# Patient Record
Sex: FEMALE | Race: WHITE | Employment: FULL TIME | URBAN - METROPOLITAN AREA
[De-identification: names, ages, dates, MRNs, and addresses within clinical notes are randomized per-mention and may not be internally consistent; named-entity substitution may affect disease eponyms.]

---

## 2017-08-02 ENCOUNTER — OFFICE VISIT (OUTPATIENT)
Dept: FAMILY MEDICINE | Facility: CLINIC | Age: 52
End: 2017-08-02
Payer: COMMERCIAL

## 2017-08-02 VITALS
OXYGEN SATURATION: 97 % | WEIGHT: 202 LBS | HEIGHT: 67 IN | SYSTOLIC BLOOD PRESSURE: 130 MMHG | DIASTOLIC BLOOD PRESSURE: 80 MMHG | BODY MASS INDEX: 31.71 KG/M2 | HEART RATE: 87 BPM | TEMPERATURE: 98.5 F

## 2017-08-02 DIAGNOSIS — Z12.4 SCREENING FOR MALIGNANT NEOPLASM OF CERVIX: ICD-10-CM

## 2017-08-02 DIAGNOSIS — J45.30 MILD PERSISTENT ASTHMA WITHOUT COMPLICATION: ICD-10-CM

## 2017-08-02 DIAGNOSIS — Z13.6 CARDIOVASCULAR SCREENING; LDL GOAL LESS THAN 160: ICD-10-CM

## 2017-08-02 DIAGNOSIS — Z00.00 ENCOUNTER FOR ROUTINE ADULT HEALTH EXAMINATION WITHOUT ABNORMAL FINDINGS: Primary | ICD-10-CM

## 2017-08-02 DIAGNOSIS — Z12.31 ENCOUNTER FOR SCREENING MAMMOGRAM FOR BREAST CANCER: ICD-10-CM

## 2017-08-02 DIAGNOSIS — J30.2 SEASONAL ALLERGIC RHINITIS, UNSPECIFIED CHRONICITY, UNSPECIFIED TRIGGER: ICD-10-CM

## 2017-08-02 DIAGNOSIS — Z13.1 SCREENING FOR DIABETES MELLITUS: ICD-10-CM

## 2017-08-02 PROCEDURE — G0145 SCR C/V CYTO,THINLAYER,RESCR: HCPCS | Performed by: FAMILY MEDICINE

## 2017-08-02 PROCEDURE — G0476 HPV COMBO ASSAY CA SCREEN: HCPCS | Performed by: FAMILY MEDICINE

## 2017-08-02 PROCEDURE — 99396 PREV VISIT EST AGE 40-64: CPT | Performed by: FAMILY MEDICINE

## 2017-08-02 RX ORDER — FLUTICASONE PROPIONATE 220 UG/1
2 AEROSOL, METERED RESPIRATORY (INHALATION) 2 TIMES DAILY
Qty: 3 INHALER | Refills: 3 | Status: SHIPPED | OUTPATIENT
Start: 2017-08-02 | End: 2018-10-01

## 2017-08-02 RX ORDER — ALBUTEROL SULFATE 90 UG/1
2 AEROSOL, METERED RESPIRATORY (INHALATION) EVERY 6 HOURS PRN
Qty: 1 INHALER | Refills: 11 | Status: SHIPPED | OUTPATIENT
Start: 2017-08-02 | End: 2018-09-17

## 2017-08-02 RX ORDER — ALBUTEROL SULFATE 0.83 MG/ML
1 SOLUTION RESPIRATORY (INHALATION) EVERY 6 HOURS PRN
Qty: 1 BOX | Refills: 11 | Status: SHIPPED | OUTPATIENT
Start: 2017-08-02 | End: 2018-10-08

## 2017-08-02 RX ORDER — FLUTICASONE PROPIONATE 110 UG/1
2 AEROSOL, METERED RESPIRATORY (INHALATION) 2 TIMES DAILY
Qty: 1 INHALER | Refills: 11 | Status: CANCELLED | OUTPATIENT
Start: 2017-08-02

## 2017-08-02 RX ORDER — FLUTICASONE PROPIONATE 50 MCG
2 SPRAY, SUSPENSION (ML) NASAL DAILY
Qty: 1 BOTTLE | Refills: 11 | Status: SHIPPED | OUTPATIENT
Start: 2017-08-02 | End: 2018-09-17

## 2017-08-02 ASSESSMENT — ANXIETY QUESTIONNAIRES
6. BECOMING EASILY ANNOYED OR IRRITABLE: NOT AT ALL
GAD7 TOTAL SCORE: 5
IF YOU CHECKED OFF ANY PROBLEMS ON THIS QUESTIONNAIRE, HOW DIFFICULT HAVE THESE PROBLEMS MADE IT FOR YOU TO DO YOUR WORK, TAKE CARE OF THINGS AT HOME, OR GET ALONG WITH OTHER PEOPLE: SOMEWHAT DIFFICULT
1. FEELING NERVOUS, ANXIOUS, OR ON EDGE: SEVERAL DAYS
3. WORRYING TOO MUCH ABOUT DIFFERENT THINGS: MORE THAN HALF THE DAYS
5. BEING SO RESTLESS THAT IT IS HARD TO SIT STILL: NOT AT ALL
7. FEELING AFRAID AS IF SOMETHING AWFUL MIGHT HAPPEN: NOT AT ALL
2. NOT BEING ABLE TO STOP OR CONTROL WORRYING: MORE THAN HALF THE DAYS

## 2017-08-02 ASSESSMENT — PATIENT HEALTH QUESTIONNAIRE - PHQ9: 5. POOR APPETITE OR OVEREATING: NOT AT ALL

## 2017-08-02 NOTE — NURSING NOTE
"Chief Complaint   Patient presents with     Physical     Patient Request     Need new Neb Machine        Initial /86  Pulse 87  Temp 98.5  F (36.9  C) (Oral)  Ht 5' 7.44\" (1.713 m)  Wt 202 lb (91.6 kg)  SpO2 97%  BMI 31.23 kg/m2 Estimated body mass index is 31.23 kg/(m^2) as calculated from the following:    Height as of this encounter: 5' 7.44\" (1.713 m).    Weight as of this encounter: 202 lb (91.6 kg).  Medication Reconciliation: complete     An TERESITA Tanner    "

## 2017-08-02 NOTE — PATIENT INSTRUCTIONS
Preventive Health Recommendations  Female Ages 40 to 49    Yearly exam:     See your health care provider every year in order to  1. Review health changes.   2. Discuss preventive care.    3. Review your medicines if your doctor prescribed any.      Get a Pap test every three years (unless you have an abnormal result and your provider advises testing more often).      If you get Pap tests with HPV test, you only need to test every 5 years, unless you have an abnormal result. You do not need a Pap test if your uterus was removed (hysterectomy) and you have not had cancer.      You should be tested each year for STDs (sexually transmitted diseases), if you're at risk.       Ask your doctor if you should have a mammogram.      Have a colonoscopy (test for colon cancer) if someone in your family has had colon cancer or polyps before age 50.       Have a cholesterol test every 5 years.       Have a diabetes test (fasting glucose) after age 45. If you are at risk for diabetes, you should have this test every 3 years.    Shots: Get a flu shot each year. Get a tetanus shot every 10 years.     Nutrition:     Eat at least 5 servings of fruits and vegetables each day.    Eat whole-grain bread, whole-wheat pasta and brown rice instead of white grains and rice.    Talk to your provider about Calcium and Vitamin D.     Lifestyle    Exercise at least 150 minutes a week (an average of 30 minutes a day, 5 days a week). This will help you control your weight and prevent disease.    Limit alcohol to one drink per day.    No smoking.     Wear sunscreen to prevent skin cancer.    See your dentist every six months for an exam and cleaning.    [facility]    If you have any questions regarding to your visit please contact your care team:       Team Purple:   Clinic Hours Telephone Number   Dr. Claudia Perera     7am-7pm  Monday - Thursday   7am-5pm  Fridays  [phone]  (Appointment  scheduling available 24/7)    Questions about your Visit?   Team Line:  [phone]   Urgent Care - [place] - 11am-9pm Monday-Friday Saturday-Sunday- 9am-5pm   [place] - 5pm-9pm Monday-Friday Saturday-Sunday- 9am-5pm  [phone] - [place]   [phone] - [place]       What options do I have for visits at the clinic other than the traditional office visit?  To expand how we care for you, many of our providers are utilizing electronic visits (e-visits) and telephone visits, when medically appropriate, for interactions with their patients rather than a visit in the clinic.   We also offer nurse visits for many medical concerns. Just like any other service, we will bill your insurance company for this type of visit based on time spent on the phone with your provider. Not all insurance companies cover these visits. Please check with your medical insurance if this type of visit is covered. You will be responsible for any charges that are not paid by your insurance.      E-visits via CCBR-SYNARC:  generally incur a $35.00 fee.  Telephone visits:  Time spent on the phone: *charged based on time that is spent on the phone in increments of 10 minutes. Estimated cost:   5-10 mins $30.00   11-20 mins. $59.00   21-30 mins. $85.00     Use Comutohart (secure email communication and access to your chart) to send your primary care provider a message or make an appointment. Ask someone on your Team how to sign up for CCBR-SYNARC.  For a Price Quote for your services, please call our Consumer Price Line at [phone].  As always, Thank you for trusting us with your health care needs!

## 2017-08-02 NOTE — MR AVS SNAPSHOT
After Visit Summary   8/2/2017    Itzel Zaacrias    MRN: [id number]           Patient Information     Date Of Birth          1965        Visit Information        Provider Department      8/2/2017 2:45 PM Claudia Nagel MD [facility]        Today's Diagnoses     Encounter for routine adult health examination without abnormal findings    -  1    Screening for diabetes mellitus        Seasonal allergic rhinitis, unspecified chronicity, unspecified trigger        Mild persistent asthma without complication        Screening for malignant neoplasm of cervix        CARDIOVASCULAR SCREENING; LDL GOAL LESS THAN 160        Encounter for screening mammogram for breast cancer          Care Instructions      Preventive Health Recommendations  Female Ages 40 to 49    Yearly exam:     See your health care provider every year in order to  1. Review health changes.   2. Discuss preventive care.    3. Review your medicines if your doctor prescribed any.      Get a Pap test every three years (unless you have an abnormal result and your provider advises testing more often).      If you get Pap tests with HPV test, you only need to test every 5 years, unless you have an abnormal result. You do not need a Pap test if your uterus was removed (hysterectomy) and you have not had cancer.      You should be tested each year for STDs (sexually transmitted diseases), if you're at risk.       Ask your doctor if you should have a mammogram.      Have a colonoscopy (test for colon cancer) if someone in your family has had colon cancer or polyps before age 50.       Have a cholesterol test every 5 years.       Have a diabetes test (fasting glucose) after age 45. If you are at risk for diabetes, you should have this test every 3 years.    Shots: Get a flu shot each year. Get a tetanus shot every 10 years.     Nutrition:     Eat at least 5 servings of fruits and vegetables each day.    Eat whole-grain  bread, whole-wheat pasta and brown rice instead of white grains and rice.    Talk to your provider about Calcium and Vitamin D.     Lifestyle    Exercise at least 150 minutes a week (an average of 30 minutes a day, 5 days a week). This will help you control your weight and prevent disease.    Limit alcohol to one drink per day.    No smoking.     Wear sunscreen to prevent skin cancer.    See your dentist every six months for an exam and cleaning.    [facility]    If you have any questions regarding to your visit please contact your care team:       Team Purple:   Clinic Hours Telephone Number   Dr. Claudia Perera     7am-7pm  Monday - Thursday   7am-5pm  Fridays  [phone]  (Appointment scheduling available 24/7)    Questions about your Visit?   Team Line:  [phone]   [facility] - [place] [facility] [place] - 11am-9pm Monday-Friday Saturday-Sunday- 9am-5pm   [place] - 5pm-9pm Monday-Friday Saturday-Sunday- 9am-5pm  [phone] - [facility]  [phone] - [place]       What options do I have for visits at the clinic other than the traditional office visit?  To expand how we care for you, many of our providers are utilizing electronic visits (e-visits) and telephone visits, when medically appropriate, for interactions with their patients rather than a visit in the clinic.   We also offer nurse visits for many medical concerns. Just like any other service, we will bill your insurance company for this type of visit based on time spent on the phone with your provider. Not all insurance companies cover these visits. Please check with your medical insurance if this type of visit is covered. You will be responsible for any charges that are not paid by your insurance.      E-visits via Attentio:  generally incur a $35.00 fee.  Telephone visits:  Time spent on the phone: *charged based on time that is spent on the phone in increments of 10  "minutes. Estimated cost:   5-10 mins $30.00   11-20 mins. $59.00   21-30 mins. $85.00     Use Thuuzhart (secure email communication and access to your chart) to send your primary care provider a message or make an appointment. Ask someone on your Team how to sign up for TransGamingt.  For a Price Quote for your services, please call our [place] Line at [phone].  As always, Thank you for trusting us with your health care needs!              Follow-ups after your visit        Future tests that were ordered for you today     Open Future Orders        Priority Expected Expires Ordered    Lipid panel reflex to direct LDL Routine  9/2/2017 8/2/2017    GLUCOSE Routine  9/2/2017 8/2/2017    MA Screening Digital Bilateral Routine  10/31/2017 8/2/2017            Who to contact     If you have questions or need follow up information about today's clinic visit or your schedule please contact [facility] directly at [phone].  Normal or non-critical lab and imaging results will be communicated to you by Thuuzhart, letter or phone within 4 business days after the clinic has received the results. If you do not hear from us within 7 days, please contact the clinic through Thuuzhart or phone. If you have a critical or abnormal lab result, we will notify you by phone as soon as possible.  Submit refill requests through Host [employer] or call your pharmacy and they will forward the refill request to us. Please allow 3 business days for your refill to be completed.          Additional Information About Your Visit        Host Analytics Information     Host Analytics lets you send messages to your doctor, view your test results, renew your prescriptions, schedule appointments and more. To sign up, go to [email or web address] . Click on \"Log in\" on the left side of the screen, which will take you to the Welcome page. Then click on \"Sign up Now\" on the right side of the page.     You will be asked to enter the access code listed below, as " "well as some personal information. Please follow the directions to create your username and password.     Your access code is: [email or web address]  Expires: 10/31/2017  3:55 PM     Your access code will  in 90 days. If you need help or a new code, please call your [place] clinic or [phone].        Care EveryWhere ID     This is your Care EveryWhere ID. This could be used by other organizations to access your [place] medical records  [id number]        Your Vitals Were     Pulse Temperature Height Pulse Oximetry BMI (Body Mass Index)       87 98.5  F (36.9  C) (Oral) 5' 7.44\" (1.713 m) 97% 31.23 kg/m2        Blood Pressure from Last 3 Encounters:   17 130/80   13 120/78   13 100/76    Weight from Last 3 Encounters:   17 202 lb (91.6 kg)   13 165 lb (74.8 kg)   13 163 lb 4 oz (74 kg)              We Performed the Following     Asthma Action Plan (AAP)     DEPRESSION ACTION PLAN (DAP)     HPV High Risk Types DNA Cervical     Pap imaged thin layer screen with HPV - recommended age 30 - 65 years (select HPV order below)          Today's Medication Changes          These changes are accurate as of: 17  4:12 PM.  If you have any questions, ask your nurse or doctor.               Start taking these medicines.        Dose/Directions    fluticasone 220 MCG/ACT Inhaler   Commonly known as:  FLOVENT HFA   Used for:  Mild persistent asthma without complication   Replaces:  fluticasone 110 MCG/ACT Inhaler   Started by:  Claudia Nagel MD        Dose:  2 puff   Inhale 2 puffs into the lungs 2 times daily   Quantity:  3 Inhaler   Refills:  3       order for DME   Used for:  Mild persistent asthma without complication   Started by:  Claudia Nagel MD        nebulizer   Quantity:  1 each   Refills:  0         Stop taking these medicines if you haven't already. Please contact your care team if you have questions.     fluticasone 110 MCG/ACT Inhaler   Commonly known as:  FLOVENT " HFA   Replaced by:  fluticasone 220 MCG/ACT Inhaler   Stopped by:  Claudia Nagel MD                Where to get your medicines      These medications were sent to [facility] [place], [place] - [address]  [address], [place] [address]     Phone:  [phone]     albuterol (2.5 MG/3ML) 0.083% neb solution    albuterol 108 (90 BASE) MCG/ACT Inhaler    fluticasone 220 MCG/ACT Inhaler    fluticasone 50 MCG/ACT spray         Some of these will need a paper prescription and others can be bought over the counter.  Ask your nurse if you have questions.     Bring a paper prescription for each of these medications     order for DME                Primary Care Provider Office Phone # Fax #    Sav Thompson [phone]        KRAIG KOEHLER [facility] [address] [place] [facility] [address]        Equal Access to Services     [facility] Hospital[facility] AH: Hadii lori ku hadasho Soomaali, waaxda luqadaha, qaybta kaalmada adeegyada, waxay idiin hayaan chhaya khjim thurston . So [facility] [phone].    ATENCIÓN: Si habla español, tiene a sharpe disposición servicios gratuitos de asistencia lingüística. Llame al [phone].    We comply with applicable federal civil rights laws and [place] laws. We do not discriminate on the basis of race, color, national origin, age, disability sex, sexual orientation or gender identity.            Thank you!     Thank you for choosing [facility]  for your care. Our goal is always to provide you with excellent care. Hearing back from our patients is one way we can continue to improve our services. Please take a few minutes to complete the written survey that you may receive in the mail after your visit with us. Thank you!             Your Updated Medication List - Protect others around you: Learn how to safely use, store and throw away your medicines at www.disposemymeds.org.          This list is accurate as of: 8/2/17  4:12 PM.  Always use  your most recent med list.                   Brand Name Dispense Instructions for use Diagnosis    * albuterol 108 (90 BASE) MCG/ACT Inhaler    albuterol    1 Inhaler    Inhale 2 puffs into the lungs every 6 hours as needed    Mild persistent asthma without complication       * albuterol (2.5 MG/3ML) 0.083% neb solution     1 Box    Take 1 vial (2.5 mg) by nebulization every 6 hours as needed    Mild persistent asthma without complication       fluticasone 220 MCG/ACT Inhaler    FLOVENT HFA    3 Inhaler    Inhale 2 puffs into the lungs 2 times daily    Mild persistent asthma without complication       fluticasone 50 MCG/ACT spray    FLONASE    1 Bottle    Spray 2 sprays in nostril daily    Seasonal allergic rhinitis, unspecified chronicity, unspecified trigger       order for DME     1 each    nebulizer    Mild persistent asthma without complication       * Notice:  This list has 2 medication(s) that are the same as other medications prescribed for you. Read the directions carefully, and ask your doctor or other care provider to review them with you.
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
## 2017-08-02 NOTE — LETTER
My Asthma Action Plan  Name: Itzel Zacarias   YOB: 1965  Date: 8/2/2017   My doctor: LIZZETTE VALERIO MD   My clinic: [facility]        My Control Medicine: Fluticasone propionate (Flovent) -   mcg 2 puffs twice a day  My Rescue Medicine: Albuterol (Proair/Ventolin/Proventil) inhaler 1 puff every 4 hours as needed for shortness of breath    My Asthma Severity: mild persistent  Avoid your asthma triggers: smoke, upper respiratory infections, dust mites and pollens               GREEN ZONE   Good Control    I feel good    No cough or wheeze    Can work, sleep and play without asthma symptoms       Take your asthma control medicine every day.     1. If exercise triggers your asthma, take your rescue medication    15 minutes before exercise or sports, and    During exercise if you have asthma symptoms  2. Spacer to use with inhaler: If you have a spacer, make sure to use it with your inhaler             YELLOW ZONE Getting Worse  I have ANY of these:    I do not feel good    Cough or wheeze    Chest feels tight    Wake up at night   1. Keep taking your Green Zone medications  2. Start taking your rescue medicine:    every 20 minutes for up to 1 hour. Then every 4 hours for 24-48 hours.  3. If you stay in the Yellow Zone for more than 12-24 hours, contact your doctor.  4. If you do not return to the Green Zone in 12-24 hours or you get worse, start taking your oral steroid medicine if prescribed by your provider.           RED ZONE Medical Alert - Get Help  I have ANY of these:    I feel awful    Medicine is not helping    Breathing getting harder    Trouble walking or talking    Nose opens wide to breathe       1. Take your rescue medicine NOW  2. If your provider has prescribed an oral steroid medicine, start taking it NOW  3. Call your doctor NOW  4. If you are still in the Red Zone after 20 minutes and you have not reached your doctor:    Take your rescue medicine again  and    Call 911 or go to the emergency room right away    See your regular doctor within 2 weeks of an Emergency Room or Urgent Care visit for follow-up treatment.        Electronically signed by: LIZZETTE VALERIO, August 2, 2017    Annual Reminders:  Meet with Asthma Educator,  Flu Shot in the Fall, consider Pneumonia Vaccination for patients with asthma (aged 19 and older).    Pharmacy: [email or web address] [address] [place] [address] AT [address]                    Asthma Triggers  How To Control Things That Make Your Asthma Worse    Triggers are things that make your asthma worse.  Look at the list below to help you find your triggers and what you can do about them.  You can help prevent asthma flare-ups by staying away from your triggers.      Trigger                                                          What you can do   Cigarette Smoke  Tobacco smoke can make asthma worse. Do not allow smoking in your home, car or around you.  Be sure no one smokes at a child s day care or school.  If you smoke, ask your health care provider for ways to help you quit.  Ask family members to quit too.  Ask your health care provider for a referral to Quit Plan to help you quit smoking, or call 4-689-863-PLAN.     Colds, Flu, Bronchitis  These are common triggers of asthma. Wash your hands often.  Don t touch your eyes, nose or mouth.  Get a flu shot every year.     Dust Mites  These are tiny bugs that live in cloth or carpet. They are too small to see. Wash sheets and blankets in hot water every week.   Encase pillows and mattress in dust mite proof covers.  Avoid having carpet if you can. If you have carpet, vacuum weekly.   Use a dust mask and HEPA vacuum.   Pollen and Outdoor Mold  Some people are allergic to trees, grass, or weed pollen, or molds. Try to keep your windows closed.  Limit time out doors when pollen count is high.   Ask you health care provider about taking medicine during  allergy season.     Animal Dander  Some people are allergic to skin flakes, urine or saliva from pets with fur or feathers. Keep pets with fur or feathers out of your home.    If you can t keep the pet outdoors, then keep the pet out of your bedroom.  Keep the bedroom door closed.  Keep pets off cloth furniture and away from stuffed toys.     Mice, Rats, and Cockroaches  Some people are allergic to the waste from these pests.   Cover food and garbage.  Clean up spills and food crumbs.  Store grease in the refrigerator.   Keep food out of the bedroom.   Indoor Mold  This can be a trigger if your home has high moisture. Fix leaking faucets, pipes, or other sources of water.   Clean moldy surfaces.  Dehumidify basement if it is damp and smelly.   Smoke, Strong Odors, and Sprays  These can reduce air quality. Stay away from strong odors and sprays, such as perfume, powder, hair spray, paints, smoke incense, paint, cleaning products, candles and new carpet.   Exercise or Sports  Some people with asthma have this trigger. Be active!  Ask your doctor about taking medicine before sports or exercise to prevent symptoms.    Warm up for 5-10 minutes before and after sports or exercise.     Other Triggers of Asthma  Cold air:  Cover your nose and mouth with a scarf.  Sometimes laughing or crying can be a trigger.  Some medicines and food can trigger asthma.

## 2017-08-02 NOTE — LETTER
August 15, 2017    Itzel Zacarias  [address] [place] [address]    [place] [address]    Dear Itzel,  We are happy to inform you that your PAP smear result from 8/2/17 is normal.  We are now able to do a follow up test on PAP smears. The DNA test is for HPV (Human Papilloma Virus). Cervical cancer is closely linked with certain types of HPV. Your result showed no evidence of high risk HPV.  Therefore we recommend you return in 3 years for your next pap smear and HPV test.  You will still need to return to the clinic every year for an annual exam and other preventive tests.  Please contact the clinic at [phone] with any questions.  Sincerely,    LIZZETTE VALERIO MD/richard

## 2017-08-03 ASSESSMENT — PATIENT HEALTH QUESTIONNAIRE - PHQ9: SUM OF ALL RESPONSES TO PHQ QUESTIONS 1-9: 8

## 2017-08-03 ASSESSMENT — ANXIETY QUESTIONNAIRES: GAD7 TOTAL SCORE: 5

## 2017-08-03 ASSESSMENT — ASTHMA QUESTIONNAIRES: ACT_TOTALSCORE: 10

## 2017-08-04 LAB
COPATH REPORT: NORMAL
PAP: NORMAL

## 2017-08-07 ENCOUNTER — TELEPHONE (OUTPATIENT)
Dept: FAMILY MEDICINE | Facility: CLINIC | Age: 52
End: 2017-08-07

## 2017-08-07 NOTE — TELEPHONE ENCOUNTER
Patient was in to see Dr. Nagel on 08-2-17 and failed their ACT by scoring 10. Please put in the failed ACT workflow for follow-up. Thank you.

## 2017-08-07 NOTE — LETTER
[facility]  [address]  [place] [id number]  [phone]    September 5, 2017      Itzel Zacarias  [address] [place] [address] [address]  [facility] [place] [address]      Dear Itzel,     Your clinic record indicates that you are due for an asthma update. We have a survey tool called an ACT (or Asthma Control Test) we use to measure the level of control of your asthma. Please complete the enclosed questionnaire and mail it back to us in the self-addressed stamped envelope.     If you have questions about this letter please contact your provider.     Sincerely,    [facility]

## 2017-08-08 LAB
FINAL DIAGNOSIS: NORMAL
HPV HR 12 DNA CVX QL NAA+PROBE: NEGATIVE
HPV16 DNA SPEC QL NAA+PROBE: NEGATIVE
HPV18 DNA SPEC QL NAA+PROBE: NEGATIVE
SPECIMEN DESCRIPTION: NORMAL

## 2017-08-24 ENCOUNTER — RADIANT APPOINTMENT (OUTPATIENT)
Dept: MAMMOGRAPHY | Facility: CLINIC | Age: 52
End: 2017-08-24
Attending: FAMILY MEDICINE
Payer: COMMERCIAL

## 2017-08-24 DIAGNOSIS — Z13.1 SCREENING FOR DIABETES MELLITUS: ICD-10-CM

## 2017-08-24 DIAGNOSIS — Z12.31 VISIT FOR SCREENING MAMMOGRAM: ICD-10-CM

## 2017-08-24 DIAGNOSIS — Z13.6 CARDIOVASCULAR SCREENING; LDL GOAL LESS THAN 160: ICD-10-CM

## 2017-08-24 LAB
CHOLEST SERPL-MCNC: 196 MG/DL
GLUCOSE SERPL-MCNC: 96 MG/DL (ref 70–99)
HDLC SERPL-MCNC: 71 MG/DL
LDLC SERPL CALC-MCNC: 111 MG/DL
NONHDLC SERPL-MCNC: 125 MG/DL
TRIGL SERPL-MCNC: 71 MG/DL

## 2017-08-24 PROCEDURE — 82947 ASSAY GLUCOSE BLOOD QUANT: CPT | Performed by: FAMILY MEDICINE

## 2017-08-24 PROCEDURE — 80061 LIPID PANEL: CPT | Performed by: FAMILY MEDICINE

## 2017-08-24 PROCEDURE — G0202 SCR MAMMO BI INCL CAD: HCPCS | Mod: TC

## 2017-08-24 PROCEDURE — 36415 COLL VENOUS BLD VENIPUNCTURE: CPT | Performed by: FAMILY MEDICINE

## 2017-08-31 ENCOUNTER — TRANSFERRED RECORDS (OUTPATIENT)
Dept: HEALTH INFORMATION MANAGEMENT | Facility: CLINIC | Age: 52
End: 2017-08-31

## 2017-09-12 ENCOUNTER — OFFICE VISIT (OUTPATIENT)
Dept: FAMILY MEDICINE | Facility: CLINIC | Age: 52
End: 2017-09-12
Payer: COMMERCIAL

## 2017-09-12 VITALS
SYSTOLIC BLOOD PRESSURE: 122 MMHG | TEMPERATURE: 97.7 F | HEIGHT: 67 IN | BODY MASS INDEX: 32.57 KG/M2 | OXYGEN SATURATION: 98 % | HEART RATE: 92 BPM | DIASTOLIC BLOOD PRESSURE: 66 MMHG | WEIGHT: 207.5 LBS

## 2017-09-12 DIAGNOSIS — Z23 NEED FOR PROPHYLACTIC VACCINATION AND INOCULATION AGAINST INFLUENZA: ICD-10-CM

## 2017-09-12 DIAGNOSIS — J98.01 BRONCHOSPASM: ICD-10-CM

## 2017-09-12 DIAGNOSIS — J45.30 MILD PERSISTENT ASTHMA WITHOUT COMPLICATION: ICD-10-CM

## 2017-09-12 DIAGNOSIS — J30.2 SEASONAL ALLERGIC RHINITIS, UNSPECIFIED CHRONICITY, UNSPECIFIED TRIGGER: Primary | ICD-10-CM

## 2017-09-12 PROCEDURE — 99213 OFFICE O/P EST LOW 20 MIN: CPT | Mod: 25 | Performed by: FAMILY MEDICINE

## 2017-09-12 PROCEDURE — 90471 IMMUNIZATION ADMIN: CPT | Performed by: FAMILY MEDICINE

## 2017-09-12 PROCEDURE — 90686 IIV4 VACC NO PRSV 0.5 ML IM: CPT | Performed by: FAMILY MEDICINE
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
## 2017-09-12 NOTE — PROGRESS NOTES
Injectable Influenza Immunization Documentation    1.  Are you sick today? (Fever of 100.5 or higher on the day of the clinic)   No    2.  Have you ever had Guillain-Sigurd Syndrome within 6 weeks of an influenza vaccionation?  No    3. Do you have a life-threatening allergy to eggs?  No    4. Do you have a life-threatening allergy to a component of the vaccine? May include antibiotics, gelatin or latex.  No     5. Have you ever had a reaction to a dose of flu vaccine that needed immediate medical attention?  No     Form completed by Patient

## 2017-09-12 NOTE — NURSING NOTE
"Chief Complaint   Patient presents with     Cough     x 3-4 days       Initial /66  Pulse 92  Temp 97.7  F (36.5  C) (Oral)  Ht 5' 7.44\" (1.713 m)  Wt 207 lb 8 oz (94.1 kg)  SpO2 98%  BMI 32.08 kg/m2 Estimated body mass index is 32.08 kg/(m^2) as calculated from the following:    Height as of this encounter: 5' 7.44\" (1.713 m).    Weight as of this encounter: 207 lb 8 oz (94.1 kg).  Medication Reconciliation: complete     An TERESITA Tanner    "

## 2017-09-12 NOTE — PATIENT INSTRUCTIONS
[facility]    If you have any questions regarding to your visit please contact your care team:       Team Purple:   Clinic Hours Telephone Number   Dr. Claudia Perera     7am-7pm  Monday - Thursday   7am-5pm  Fridays  [phone]  (Appointment scheduling available 24/7)    Questions about your Visit?   Team Line:  [phone]   Urgent Care - [place] and [facility] - 11am-9pm Monday-Friday Saturday-Sunday- 9am-5pm   [place] - 5pm-9pm Monday-Friday Saturday-Sunday- 9am-5pm  [phone] - [facility]  [phone] - [place]       What options do I have for visits at the clinic other than the traditional office visit?  To expand how we care for you, many of our providers are utilizing electronic visits (e-visits) and telephone visits, when medically appropriate, for interactions with their patients rather than a visit in the clinic.   We also offer nurse visits for many medical concerns. Just like any other service, we will bill your insurance company for this type of visit based on time spent on the phone with your provider. Not all insurance companies cover these visits. Please check with your medical insurance if this type of visit is covered. You will be responsible for any charges that are not paid by your insurance.      E-visits via [employer]:  generally incur a $35.00 fee.  Telephone visits:  Time spent on the phone: *charged based on time that is spent on the phone in increments of 10 minutes. Estimated cost:   5-10 mins $30.00   11-20 mins. $59.00   21-30 mins. $85.00     Use [employer] (secure email communication and access to your chart) to send your primary care provider a message or make an appointment. Ask someone on your Team how to sign up for [employer].  For a Price Quote for your services, please call our Consumer Price Line at [phone].  As always, Thank you for trusting us with your health care needs!

## 2017-09-12 NOTE — MR AVS SNAPSHOT
After Visit Summary   9/12/2017    Itzel Zacarias    MRN: [id number]           Patient Information     Date Of Birth          1965        Visit Information        Provider Department      9/12/2017 11:15 AM Claudia Nagel MD [facility] Instructions    [facility]    If you have any questions regarding to your visit please contact your care team:       Team Purple:   Clinic Hours Telephone Number   Dr. Claudia Perera     7am-7pm  Monday - Thursday   7am-5pm  Fridays  [phone]  (Appointment scheduling available 24/7)    Questions about your Visit?   Team Line:  [phone]   [facility] [place] [facility] - 11am-9pm Monday-Friday Saturday-Sunday- 9am-5pm   [place] - 5pm-9pm Monday-Friday Saturday-Sunday- 9am-5pm  [phone] - [place]   [phone] - [place]       What options do I have for visits at the clinic other than the traditional office visit?  To expand how we care for you, many of our providers are utilizing electronic visits (e-visits) and telephone visits, when medically appropriate, for interactions with their patients rather than a visit in the clinic.   We also offer nurse visits for many medical concerns. Just like any other service, we will bill your insurance company for this type of visit based on time spent on the phone with your provider. Not all insurance companies cover these visits. Please check with your medical insurance if this type of visit is covered. You will be responsible for any charges that are not paid by your insurance.      E-visits via [employer]:  generally incur a $35.00 fee.  Telephone visits:  Time spent on the phone: *charged based on time that is spent on the phone in increments of 10 minutes. Estimated cost:   5-10 mins $30.00   11-20 mins. $59.00   21-30 mins. $85.00     Use [employer] (secure email communication and access to your  "chart) to send your primary care provider a message or make an appointment. Ask someone on your Team how to sign up for [employer].  For a Price Quote for your services, please call our [employer] Price Line at [phone].  As always, Thank you for trusting us with your health care needs!              Follow-ups after your visit        Who to contact     If you have questions or need follow up information about today's clinic visit or your schedule please contact [facility] directly at [phone].  Normal or non-critical lab and imaging results will be communicated to you by [employer]art, letter or phone within 4 business days after the clinic has received the results. If you do not hear from us within 7 days, please contact the clinic through Population [employer] or phone. If you have a critical or abnormal lab result, we will notify you by phone as soon as possible.  Submit refill requests through [employer] or call your pharmacy and they will forward the refill request to us. Please allow 3 business days for your refill to be completed.          Additional Information About Your Visit        [employer] Information     [employer] lets you send messages to your doctor, view your test results, renew your prescriptions, schedule appointments and more. To sign up, go to [email or web address] . Click on \"Log in\" on the left side of the screen, which will take you to the Welcome page. Then click on \"Sign up Now\" on the right side of the page.     You will be asked to enter the access code listed below, as well as some personal information. Please follow the directions to create your username and password.     Your access code is: [email or web address]  Expires: 10/31/2017  3:55 PM     Your access code will  in 90 days. If you need help or a new code, please call your [place] clinic or [phone].        Care EveryWhere ID     This is your Care EveryWhere ID. This could be used by other organizations to access your [place] medical " "records  [id number]        Your Vitals Were     Pulse Temperature Height Pulse Oximetry BMI (Body Mass Index)       92 97.7  F (36.5  C) (Oral) 5' 7.44\" (1.713 m) 98% 32.08 kg/m2        Blood Pressure from Last 3 Encounters:   09/12/17 122/66   08/02/17 130/80   11/19/13 120/78    Weight from Last 3 Encounters:   09/12/17 207 lb 8 oz (94.1 kg)   08/02/17 202 lb (91.6 kg)   11/19/13 165 lb (74.8 kg)              Today, you had the following     No orders found for display       Primary Care Provider Office Phone # Fax #    Sav Thompson [phone] [address] [place] [address]        Equal Access to Services     [facility]: Hadii aad ku hadasho Sosharron, waaxda luqadaha, qaybta kaalmada adeegyada, carine treadwell haybita thurston . So [facility] [phone].    ATENCIÓN: Si habla español, tiene a sharpe disposición servicios gratuitos de asistencia lingüística. [facility] [phone].    We comply with applicable federal civil rights laws and [place] laws. We do not discriminate on the basis of race, color, national origin, age, disability sex, sexual orientation or gender identity.            Thank you!     Thank you for choosing [facility]  for your care. Our goal is always to provide you with excellent care. Hearing back from our patients is one way we can continue to improve our services. Please take a few minutes to complete the written survey that you may receive in the mail after your visit with us. Thank you!             Your Updated Medication List - Protect others around you: Learn how to safely use, store and throw away your medicines at www.disposemymeds.org.          This list is accurate as of: 9/12/17 11:43 AM.  Always use your most recent med list.                   Brand Name Dispense Instructions for use Diagnosis    * albuterol 108 (90 BASE) MCG/ACT Inhaler    PROAIR HFA    1 Inhaler    Inhale 2 puffs into the lungs every 6 hours as needed    Mild " persistent asthma without complication       * albuterol (2.5 MG/3ML) 0.083% neb solution     1 Box    Take 1 vial (2.5 mg) by nebulization every 6 hours as needed    Mild persistent asthma without complication       fluticasone 220 MCG/ACT Inhaler    FLOVENT HFA    3 Inhaler    Inhale 2 puffs into the lungs 2 times daily    Mild persistent asthma without complication       fluticasone 50 MCG/ACT spray    FLONASE    1 Bottle    Spray 2 sprays in nostril daily    Seasonal allergic rhinitis, unspecified chronicity, unspecified trigger       order for DME     1 each    nebulizer    Mild persistent asthma without complication       * Notice:  This list has 2 medication(s) that are the same as other medications prescribed for you. Read the directions carefully, and ask your doctor or other care provider to review them with you.

## 2017-09-20 NOTE — TELEPHONE ENCOUNTER
Patient seen on 09/12/17, still having issues postponing message for 4 weeks for follow up. Tiny Vallecillo MA

## 2017-09-28 ENCOUNTER — TELEPHONE (OUTPATIENT)
Dept: FAMILY MEDICINE | Facility: CLINIC | Age: 52
End: 2017-09-28

## 2017-09-28 NOTE — TELEPHONE ENCOUNTER
Panel Management Review      Patient has the following on her problem list:     Asthma review     ACT Total Scores 8/2/2017   ACT TOTAL SCORE -   ASTHMA ER VISITS -   ASTHMA HOSPITALIZATIONS -   ACT TOTAL SCORE (Goal Greater than or Equal to 20) 10   In the past 12 months, how many times did you visit the emergency room for your asthma without being admitted to the hospital? 0   In the past 12 months, how many times were you hospitalized overnight because of your asthma? 0      1. Is Asthma diagnosis on the Problem List? Yes    2. Is Asthma listed on Health Maintenance? Yes    3. Patient is due for:  none        Composite cancer screening  Chart review shows that this patient is due/due soon for the following None  Summary:    Patient is due/failing the following:   None    Action needed:   None    Type of outreach:    None    Questions for provider review:    None                                                                                                                                    Martha Green MA       Chart routed to None .

## 2017-10-10 ASSESSMENT — ASTHMA QUESTIONNAIRES: ACT_TOTALSCORE: 15

## 2018-01-09 ENCOUNTER — TELEPHONE (OUTPATIENT)
Dept: FAMILY MEDICINE | Facility: CLINIC | Age: 53
End: 2018-01-09

## 2018-01-09 NOTE — TELEPHONE ENCOUNTER
Panel Management Review      Patient has the following on her problem list:     Asthma review     ACT Total Scores 10/9/2017   ACT TOTAL SCORE -   ASTHMA ER VISITS -   ASTHMA HOSPITALIZATIONS -   ACT TOTAL SCORE (Goal Greater than or Equal to 20) 15   In the past 12 months, how many times did you visit the emergency room for your asthma without being admitted to the hospital? 0   In the past 12 months, how many times were you hospitalized overnight because of your asthma? 0      1. Is Asthma diagnosis on the Problem List? Yes    2. Is Asthma listed on Health Maintenance? Yes    3. Patient is due for:  ACT-Failed        Composite cancer screening  Chart review shows that this patient is due/due soon for the following Colonoscopy  Summary:    Patient is due/failing the following:   ACT    Action needed:   Patient needs to do ACT.    Type of outreach:    Copy of ACT mailed to patient, will reach out in 5 days.   - postponing message for 2 weeks and will recheck with patient in 2 weeks    Questions for provider review:    None                                                                                                                                    Adry Tanner MA       Chart routed to Care Team .

## 2018-01-09 NOTE — LETTER
January 9, 2018      Itzel Zacarias  [address] [place] [id number]      Dear Itzel,     Your clinic record indicates that you are failing your ACT (or Asthma Control Test) which is   a survey tool that we use to measure the level of control of your asthma. A Score of 20 or Greater indicates that your symptoms are controlled and your medication are working as they should. Please complete the enclosed questionnaire and mail it back to us in the self-addressed stamped envelope.          If you have questions about this letter please contact your provider.     Sincerely,    [facility]

## 2018-01-26 ASSESSMENT — ASTHMA QUESTIONNAIRES: ACT_TOTALSCORE: 17

## 2018-01-29 NOTE — PROGRESS NOTES
"  SUBJECTIVE:   Itzel Zacarias is a 52 year old female who presents to clinic today for the following health issues:    Throat problem       Duration: few weeks    Description (location/character/radiation): right side of throat    Intensity:  mild    Accompanying signs and symptoms: Feels like there is a lump on the right side of throat. Horse voice, no fevers or cold symptoms.     History (similar episodes/previous evaluation): None    Precipitating or alleviating factors: None    Therapies tried and outcome: Ibuprofen has not helped.      Right sided throat lump, for a few weeks, a little sore, tried ibuprofen and hasn't helped at all, causing hoarse voice  Fluctuating weight a little  No issues with temperature   Same appetite  Has been somewhat fatigued  No recent URI  PHQ-9  - 6  DORYS- 7 - 10    Problem list and histories reviewed & adjusted, as indicated.  Additional history: as documented    BP Readings from Last 3 Encounters:   01/30/18 116/80   09/12/17 122/66   08/02/17 130/80    Wt Readings from Last 3 Encounters:   01/30/18 212 lb (96.2 kg)   09/12/17 207 lb 8 oz (94.1 kg)   08/02/17 202 lb (91.6 kg)        Reviewed and updated as needed this visit by clinical staff  Tobacco  Allergies  Meds       Reviewed and updated as needed this visit by Provider  ROS:  Constitutional, HEENT, cardiovascular, pulmonary, gi and gu systems are negative, except as otherwise noted.    OBJECTIVE:     /80  Pulse 77  Temp 98  F (36.7  C) (Oral)  Resp 18  Ht 5' 7.52\" (1.715 m)  Wt 212 lb (96.2 kg)  SpO2 99%  BMI 32.69 kg/m2  Body mass index is 32.69 kg/(m^2).  GENERAL: healthy, alert and no distress  EYES: Eyes grossly normal to inspection, PERRL and conjunctivae and sclerae normal  HENT: ear canals and TM's normal, nose and mouth without ulcers or lesions  NECK: possible small R anterior cervical lymphadenopathy vs right thyroid nodule, tender, no assymetry  RESP: lungs clear to auscultation - no rales, " rhonchi or wheezes  CV: regular rate and rhythm, normal S1 S2, no S3 or S4, no murmur, click or rub, no peripheral edema and peripheral pulses strong  MS: no gross musculoskeletal defects noted, no edema  SKIN: no suspicious lesions or rashes  NEURO: Normal strength and tone, mentation intact and speech normal  PSYCH: mentation appears normal, affect normal/bright    ASSESSMENT/PLAN:     1. Lump in neck  Will obtain CBC, thyroid studies, US of neck and thyroid  - CBC with platelets  - TSH  - T4, free  - T3, Free  - US Head Neck Soft Tissue; Future    2. Fatigue, unspecified type  Will check thyroid labs    3. DORYS (generalized anxiety disorder)  Start zoloft, refer to therapy  - MENTAL HEALTH REFERRAL  - Adult; Outpatient Treatment; Individual/Couples/Family/Group Therapy/Health Psychology; [facility] [facility] [phone]; We will contact you to schedule the appointment or please call with any questions  - sertraline (ZOLOFT) 50 MG tablet; Take 1/2 tablet (25 mg) for 1-2 weeks, then increase to 1 tablet orally daily  Dispense: 30 tablet; Refill: 0    Follow-up in 1 month    Daniel Higginbotham MD  [facility]

## 2018-01-30 ENCOUNTER — OFFICE VISIT (OUTPATIENT)
Dept: FAMILY MEDICINE | Facility: CLINIC | Age: 53
End: 2018-01-30
Payer: COMMERCIAL

## 2018-01-30 VITALS
SYSTOLIC BLOOD PRESSURE: 116 MMHG | RESPIRATION RATE: 18 BRPM | BODY MASS INDEX: 32.13 KG/M2 | TEMPERATURE: 98 F | HEART RATE: 77 BPM | OXYGEN SATURATION: 99 % | HEIGHT: 68 IN | DIASTOLIC BLOOD PRESSURE: 80 MMHG | WEIGHT: 212 LBS

## 2018-01-30 DIAGNOSIS — F41.1 GAD (GENERALIZED ANXIETY DISORDER): ICD-10-CM

## 2018-01-30 DIAGNOSIS — R22.1 LUMP IN NECK: Primary | ICD-10-CM

## 2018-01-30 DIAGNOSIS — R53.83 FATIGUE, UNSPECIFIED TYPE: ICD-10-CM

## 2018-01-30 LAB
ERYTHROCYTE [DISTWIDTH] IN BLOOD BY AUTOMATED COUNT: 13.8 % (ref 10–15)
HCT VFR BLD AUTO: 41.4 % (ref 35–47)
HGB BLD-MCNC: 13.5 G/DL (ref 11.7–15.7)
MCH RBC QN AUTO: 32.2 PG (ref 26.5–33)
MCHC RBC AUTO-ENTMCNC: 32.6 G/DL (ref 31.5–36.5)
MCV RBC AUTO: 99 FL (ref 78–100)
PLATELET # BLD AUTO: 284 10E9/L (ref 150–450)
RBC # BLD AUTO: 4.19 10E12/L (ref 3.8–5.2)
T3FREE SERPL-MCNC: 2.5 PG/ML (ref 2.3–4.2)
T4 FREE SERPL-MCNC: 0.83 NG/DL (ref 0.76–1.46)
TSH SERPL DL<=0.005 MIU/L-ACNC: 0.79 MU/L (ref 0.4–4)
WBC # BLD AUTO: 7 10E9/L (ref 4–11)

## 2018-01-30 PROCEDURE — 36415 COLL VENOUS BLD VENIPUNCTURE: CPT | Performed by: FAMILY MEDICINE

## 2018-01-30 PROCEDURE — 84481 FREE ASSAY (FT-3): CPT | Performed by: FAMILY MEDICINE

## 2018-01-30 PROCEDURE — 84439 ASSAY OF FREE THYROXINE: CPT | Performed by: FAMILY MEDICINE

## 2018-01-30 PROCEDURE — 99214 OFFICE O/P EST MOD 30 MIN: CPT | Performed by: FAMILY MEDICINE

## 2018-01-30 PROCEDURE — 84443 ASSAY THYROID STIM HORMONE: CPT | Performed by: FAMILY MEDICINE

## 2018-01-30 PROCEDURE — 85027 COMPLETE CBC AUTOMATED: CPT | Performed by: FAMILY MEDICINE

## 2018-01-30 ASSESSMENT — ANXIETY QUESTIONNAIRES
IF YOU CHECKED OFF ANY PROBLEMS ON THIS QUESTIONNAIRE, HOW DIFFICULT HAVE THESE PROBLEMS MADE IT FOR YOU TO DO YOUR WORK, TAKE CARE OF THINGS AT HOME, OR GET ALONG WITH OTHER PEOPLE: SOMEWHAT DIFFICULT
1. FEELING NERVOUS, ANXIOUS, OR ON EDGE: MORE THAN HALF THE DAYS
GAD7 TOTAL SCORE: 10
5. BEING SO RESTLESS THAT IT IS HARD TO SIT STILL: NOT AT ALL
6. BECOMING EASILY ANNOYED OR IRRITABLE: NOT AT ALL
3. WORRYING TOO MUCH ABOUT DIFFERENT THINGS: MORE THAN HALF THE DAYS
2. NOT BEING ABLE TO STOP OR CONTROL WORRYING: NEARLY EVERY DAY
7. FEELING AFRAID AS IF SOMETHING AWFUL MIGHT HAPPEN: NEARLY EVERY DAY

## 2018-01-30 ASSESSMENT — PAIN SCALES - GENERAL: PAINLEVEL: NO PAIN (0)

## 2018-01-30 ASSESSMENT — PATIENT HEALTH QUESTIONNAIRE - PHQ9: 5. POOR APPETITE OR OVEREATING: NOT AT ALL

## 2018-01-30 NOTE — MR AVS SNAPSHOT
After Visit Summary   1/30/2018    Itzel Zacarias    MRN: [id number]           Patient Information     Date Of Birth          1965        Visit Information        Provider Department      1/30/2018 9:40 AM Daniel Higginbotham MD [facility]        Today's Diagnoses     Screen for colon cancer    -  1    Lump in neck        DORYS (generalized anxiety disorder)          Care Instructions    [facility]    If you have any questions regarding to your visit please contact your care team:       Team Purple:   Clinic Hours Telephone Number   Dr. Claudia Higginbotham   7am-7pm  Monday - Thursday   7am-5pm  Fridays  [phone]  (Appointment scheduling available 24/7)    Questions about your Visit?   Team Line:  [phone]   Urgent Care - [place] and [facility] - 11am-9pm Monday-Friday Saturday-Sunday- 9am-5pm   [place] - 5pm-9pm Monday-Friday Saturday-Sunday- 9am-5pm  [phone] - [facility]  [phone] - [place]       What options do I have for visits at the clinic other than the traditional office visit?  To expand how we care for you, many of our providers are utilizing electronic visits (e-visits) and telephone visits, when medically appropriate, for interactions with their patients rather than a visit in the clinic.   We also offer nurse visits for many medical concerns. Just like any other service, we will bill your insurance company for this type of visit based on time spent on the phone with your provider. Not all insurance companies cover these visits. Please check with your medical insurance if this type of visit is covered. You will be responsible for any charges that are not paid by your insurance.      E-visits via [email or web address]:  generally incur a $35.00 fee.  Telephone visits:  Time spent on the phone: *charged based on time that is spent on the phone in increments of 10  minutes. Estimated cost:   5-10 mins $30.00   11-20 mins. $59.00   21-30 mins. $85.00     Use [employer] (secure email communication and access to your chart) to send your primary care provider a message or make an appointment. Ask someone on your Team how to sign up for [employer].  For a Price Quote for your services, please call our [employer] Price Line at [phone].  As always, Thank you for trusting us with your health care needs!    Tiny Vallecillo MA            Follow-ups after your visit        Additional Services     MENTAL HEALTH REFERRAL  - Adult; Outpatient Treatment; Individual/Couples/Family/Group Therapy/Health Psychology; [facility]: [facility] [phone]; We will contact you to schedule the appointment or please call with any questions       All scheduling is subject to the client's specific insurance plan & benefits, provider/location availability, and provider clinical specialities.  Please arrive 15 minutes early for your first appointment and bring your completed paperwork.    Please be aware that coverage of these services is subject to the terms and limitations of your health insurance plan.  Call member services at your health plan with any benefit or coverage questions.                            Future tests that were ordered for you today     Open Future Orders        Priority Expected Expires Ordered    US Head Neck Soft Tissue Routine  1/30/2019 1/30/2018            Who to contact     If you have questions or need follow up information about today's clinic visit or your schedule please contact [facility] directly at [phone].  Normal or non-critical lab and imaging results will be communicated to you by MyChart, letter or phone within 4 business days after the clinic has received the results. If you do not hear from us within 7 days, please contact the clinic through MyChart or phone. If you have a critical or abnormal lab result, we will notify you by phone as  "soon as possible.  Submit refill requests through [employer] or call your pharmacy and they will forward the refill request to us. Please allow 3 business days for your refill to be completed.          Additional Information About Your Visit        Care EveryWhere ID     This is your Care EveryWhere ID. This could be used by other organizations to access your [place] medical records  [id number]        Your Vitals Were     Pulse Temperature Respirations Height Pulse Oximetry BMI (Body Mass Index)    77 98  F (36.7  C) (Oral) 18 5' 7.52\" (1.715 m) 99% 32.69 kg/m2       Blood Pressure from Last 3 Encounters:   01/30/18 116/80   09/12/17 122/66   08/02/17 130/80    Weight from Last 3 Encounters:   01/30/18 212 lb (96.2 kg)   09/12/17 207 lb 8 oz (94.1 kg)   08/02/17 202 lb (91.6 kg)              We Performed the Following     CBC with platelets     MENTAL HEALTH REFERRAL  - Adult; Outpatient Treatment; Individual/Couples/Family/Group Therapy/Health Psychology; [facility]: [facility] [phone]; We will contact you to schedule the appointment or please call with any questions     T3, Free     T4, free     TSH          Today's Medication Changes          These changes are accurate as of 1/30/18 10:25 AM.  If you have any questions, ask your nurse or doctor.               Start taking these medicines.        Dose/Directions    sertraline 50 MG tablet   Commonly known as:  ZOLOFT   Used for:  DORYS (generalized anxiety disorder)   Started by:  Daniel Cat MD        Take 1/2 tablet (25 mg) for 1-2 weeks, then increase to 1 tablet orally daily   Quantity:  30 tablet   Refills:  0            Where to get your medicines      These medications were sent to [facility] - [address]  [address], [place] [address]     Phone:  [phone]     sertraline 50 MG tablet                Primary Care Provider Office Phone # Fax #    Sav GOFF " MD Jay [phone]       [address] [place] [facility] [address]        Equal Access to Services     [facility]ER [facility] : Hadmax aad ku hadjohn Gibbs, wahoneyda luqvimal, qamaulikta kaalmada yong, carine pimentel. So [facility] [phone].    ATENCIÓN: Si habla español, tiene a sharpe disposición servicios gratuitos de asistencia lingüística. Llame al [phone].    We comply with applicable federal civil rights laws and [place] laws. We do not discriminate on the basis of race, color, national origin, age, disability, sex, sexual orientation, or gender identity.            Thank you!     Thank you for choosing [facility]  for your care. Our goal is always to provide you with excellent care. Hearing back from our patients is one way we can continue to improve our services. Please take a few minutes to complete the written survey that you may receive in the mail after your visit with us. Thank you!             Your Updated Medication List - Protect others around you: Learn how to safely use, store and throw away your medicines at www.disposemymeds.org.          This list is accurate as of 1/30/18 10:25 AM.  Always use your most recent med list.                   Brand Name Dispense Instructions for use Diagnosis    * albuterol 108 (90 BASE) MCG/ACT Inhaler    PROAIR HFA    1 Inhaler    Inhale 2 puffs into the lungs every 6 hours as needed    Mild persistent asthma without complication       * albuterol (2.5 MG/3ML) 0.083% neb solution     1 Box    Take 1 vial (2.5 mg) by nebulization every 6 hours as needed    Mild persistent asthma without complication       fluticasone 220 MCG/ACT Inhaler    FLOVENT HFA    3 Inhaler    Inhale 2 puffs into the lungs 2 times daily    Mild persistent asthma without complication       fluticasone 50 MCG/ACT spray    FLONASE    1 Bottle    Spray 2 sprays in nostril daily    Seasonal allergic rhinitis, unspecified chronicity, unspecified  trigger       order for DME     1 each    nebulizer    Mild persistent asthma without complication       sertraline 50 MG tablet    ZOLOFT    30 tablet    Take 1/2 tablet (25 mg) for 1-2 weeks, then increase to 1 tablet orally daily    DORYS (generalized anxiety disorder)       * Notice:  This list has 2 medication(s) that are the same as other medications prescribed for you. Read the directions carefully, and ask your doctor or other care provider to review them with you.

## 2018-01-30 NOTE — PATIENT INSTRUCTIONS
[facility]    If you have any questions regarding to your visit please contact your care team:       Team Purple:   Clinic Hours Telephone Number   Dr. Claudia Higginbotham   7am-7pm  Monday - Thursday   7am-5pm  Fridays  [phone]  (Appointment scheduling available 24/7)    Questions about your Visit?   Team Line:  [phone]   Urgent Care - [place] and [facility] - 11am-9pm Monday-Friday Saturday-Sunday- 9am-5pm   [place] - 5pm-9pm Monday-Friday Saturday-Sunday- 9am-5pm  [phone] - [facility]  [phone] - [place]       What options do I have for visits at the clinic other than the traditional office visit?  To expand how we care for you, many of our providers are utilizing electronic visits (e-visits) and telephone visits, when medically appropriate, for interactions with their patients rather than a visit in the clinic.   We also offer nurse visits for many medical concerns. Just like any other service, we will bill your insurance company for this type of visit based on time spent on the phone with your provider. Not all insurance companies cover these visits. Please check with your medical insurance if this type of visit is covered. You will be responsible for any charges that are not paid by your insurance.      E-visits via [employer]:  generally incur a $35.00 fee.  Telephone visits:  Time spent on the phone: *charged based on time that is spent on the phone in increments of 10 minutes. Estimated cost:   5-10 mins $30.00   11-20 mins. $59.00   21-30 mins. $85.00     Use [employer] (secure email communication and access to your chart) to send your primary care provider a message or make an appointment. Ask someone on your Team how to sign up for [employer].  For a Price Quote for your services, please call our Consumer Price Line at [phone].  As always, Thank you for trusting us with your health care  needs!    Tiny Vallecillo MA

## 2018-01-31 ASSESSMENT — PATIENT HEALTH QUESTIONNAIRE - PHQ9: SUM OF ALL RESPONSES TO PHQ QUESTIONS 1-9: 6

## 2018-01-31 ASSESSMENT — ANXIETY QUESTIONNAIRES: GAD7 TOTAL SCORE: 10

## 2018-02-06 ENCOUNTER — RADIANT APPOINTMENT (OUTPATIENT)
Dept: ULTRASOUND IMAGING | Facility: CLINIC | Age: 53
End: 2018-02-06
Attending: FAMILY MEDICINE
Payer: COMMERCIAL

## 2018-02-06 DIAGNOSIS — R22.1 LUMP IN NECK: ICD-10-CM

## 2018-02-06 PROCEDURE — 76536 US EXAM OF HEAD AND NECK: CPT

## 2018-02-09 ENCOUNTER — TELEPHONE (OUTPATIENT)
Dept: FAMILY MEDICINE | Facility: CLINIC | Age: 53
End: 2018-02-09

## 2018-02-09 NOTE — TELEPHONE ENCOUNTER
Message left on  to return call to RN hotline at [phone].   Eliana Polo RN      Phone encounter started for result note below:      Saray Robbins,     From the ultrasound results, it looks like there are no masses in your thyroid.  Also there are no enlarged/abnormal lymph nodes or masses in your neck.  Please let me know if you have any questions.     Daniel Higginbotham MD

## 2018-04-05 ENCOUNTER — OFFICE VISIT (OUTPATIENT)
Dept: PSYCHOLOGY | Facility: CLINIC | Age: 53
End: 2018-04-05
Attending: FAMILY MEDICINE
Payer: COMMERCIAL

## 2018-04-05 ENCOUNTER — TELEPHONE (OUTPATIENT)
Dept: FAMILY MEDICINE | Facility: CLINIC | Age: 53
End: 2018-04-05

## 2018-04-05 DIAGNOSIS — F34.1 PERSISTENT DEPRESSIVE DISORDER: ICD-10-CM

## 2018-04-05 DIAGNOSIS — F41.1 GENERALIZED ANXIETY DISORDER: Primary | ICD-10-CM

## 2018-04-05 PROCEDURE — 90834 PSYTX W PT 45 MINUTES: CPT | Performed by: MARRIAGE & FAMILY THERAPIST

## 2018-04-05 ASSESSMENT — ANXIETY QUESTIONNAIRES
GAD7 TOTAL SCORE: 10
IF YOU CHECKED OFF ANY PROBLEMS ON THIS QUESTIONNAIRE, HOW DIFFICULT HAVE THESE PROBLEMS MADE IT FOR YOU TO DO YOUR WORK, TAKE CARE OF THINGS AT HOME, OR GET ALONG WITH OTHER PEOPLE: VERY DIFFICULT
3. WORRYING TOO MUCH ABOUT DIFFERENT THINGS: NEARLY EVERY DAY
5. BEING SO RESTLESS THAT IT IS HARD TO SIT STILL: NOT AT ALL
7. FEELING AFRAID AS IF SOMETHING AWFUL MIGHT HAPPEN: MORE THAN HALF THE DAYS
6. BECOMING EASILY ANNOYED OR IRRITABLE: NOT AT ALL
1. FEELING NERVOUS, ANXIOUS, OR ON EDGE: SEVERAL DAYS
2. NOT BEING ABLE TO STOP OR CONTROL WORRYING: NEARLY EVERY DAY

## 2018-04-05 ASSESSMENT — PATIENT HEALTH QUESTIONNAIRE - PHQ9: 5. POOR APPETITE OR OVEREATING: SEVERAL DAYS

## 2018-04-05 NOTE — TELEPHONE ENCOUNTER
Call her. I am not sure how care coordination can be of help. She needs to go to a job employment center. They will help her with any applications she needs to do and guide her to community programs that will educate her about computer skills.   LIZZETTE VALERIO M.D.

## 2018-04-05 NOTE — PROGRESS NOTES
"                 Progress Note - Initial Session    Client Name:  Itzel Zacarias Date: 4/5/18         Service Type: Individual      Session Start Time: 9:00  Session End Time: 9:40      Session Length: 38 - 52      Session #: 1     Attendees: Client attended alone         Diagnostic Assessment in progress.  Unable to complete documentation at the conclusion of the first session due to intake paperwork not having been completed prior to session.  Client reported seeking therapy at this time for stress and depression.  Client reported that she experiences symptoms of her mind wandering, being overly emotional, hopelessness, sleep disturbance, low energy, low self-esteem, trouble concentrating, psychomotor disturbance, feeling on edge, out-of-control worry about different things, trouble relaxing, and sense of impending doom.  Client reported that these symptoms began after her  left her 5 years ago.  Client reported that her work and finances are adversely impacted by her symptoms.  Client reported that her father passed away when she was 13, and that her parents  when she was a \"little kid.\"  Client reported that she has been  for 32 years and  for the past 5 years.  Client reported wanting to work on attaining and maintaining employment in order to increase her calm mindset as a goal for therapy.  Reviewed with client possible referral by her primary care provider to Care Coordination to assist with felt needs.      Mental Status Assessment:  Appearance:   Appropriate   Eye Contact:   Good   Psychomotor Behavior: Normal  Retarded (Slowed)   Attitude:   Cooperative   Orientation:   All  Speech   Rate / Production: Normal    Volume:  Normal   Mood:    Anxious  Depressed  Normal  Affect:    Appropriate  Blunted  Worrisome   Thought Content:  Clear   Thought Form:  Coherent  Logical   Insight:    Fair       Safety Issues and Plan for Safety and Risk Management:  Client denies current " fears or concerns for personal safety.  Client denies current or recent suicidal ideation or behaviors.  Client denies current or recent homicidal ideation or behaviors.  Client denies current or recent self injurious behavior or ideation.  Client denies other safety concerns.  A safety and risk management plan has not been developed at this time, however client was given the after-hours number / 911 should there be a change in any of these risk factors.    Diagnostic Criteria:  A. Excessive anxiety and worry about a number of events or activities (such as work or school performance).   B. The person finds it difficult to control the worry.  C. Select 3 or more symptoms (required for diagnosis). Only one item is required in children.   - Restlessness or feeling keyed up or on edge.    - Being easily fatigued.    - Difficulty concentrating or mind going blank.    - Sleep disturbance (difficulty falling or staying asleep, or restless unsatisfying sleep).   D. The focus of the anxiety and worry is not confined to features of an Axis I disorder.  E. The anxiety, worry, or physical symptoms cause clinically significant distress or impairment in social, occupational, or other important areas of functioning.   F. The disturbance is not due to the direct physiological effects of a substance (e.g., a drug of abuse, a medication) or a general medical condition (e.g., hyperthyroidism) and does not occur exclusively during a Mood Disorder, a Psychotic Disorder, or a Pervasive Developmental Disorder.    - The aformentioned symptoms began 5 year(s) ago and occurs 5 days per week and is experienced as moderate.  A. Depressed mood for most of the day, for more days than not, as indicated either by subjective account or observation by others, for at least 2 years. Note: In children and adolescents, mood can be irritable and duration must be at least 1 year.   B. Presence, while depressed, of two (or more) of the following:        -  insomnia or hypersomnia       - low energy or fatigue        - low self-esteem        - poor concentration or difficulty making decisions        - feelings of hopelessness   C. During the 2-year period (1 year for children or adolescents) of the disturbance, the person has never been without the symptoms in Criteria A and B for more than 2 months at a time.  D. Criteria for a major depressive disorder may be continously present for 2 years  E. There has never been a Manic Episode, a Mixed Episode, or a Hypomanic Episode, and criteria have never been met for Cyclothymic Disorder.   F. The disturbance is not better explained by a persistent schizoaffective disorder, schizophrenia, delusional disorder, or other specified or unspecified schizophrenia spectrum and other psychotic disorder  G. The symptoms are not attributable to the physiological effects of a substance (e.g., a drug of abuse, a medication) or another medical condition (e.g., hypothyroidism).        - Late Onset: if onset is age 21 years or older         DSM5 Diagnoses: (Sustained by DSM5 Criteria Listed Above)  Diagnoses: 300.4 (F34.1) Persistent Depressive Disorder, Late onset  300.02 (F41.1) Generalized Anxiety Disorder  Psychosocial & Contextual Factors: work stress/financial stress, lack of social support  WHODAS 2.0 (12 item) will be completed at next session.  Collateral Reports Completed:  Not Applicable      PLAN: (Homework, other):  Client scheduled follow-up ongoing services with [facility].  Client agreed to bring completed intake packet to follow-up session in two weeks.      MARGUERITE Davis

## 2018-04-05 NOTE — TELEPHONE ENCOUNTER
Spoke with patient she would like a referral to Care coordination to discuss resources for learning computer skills.  Patient has does not know how to work the computer and needs to send emails regarding health and for potential jobs.   Referral pending- please advise    Christie Olmos RN

## 2018-04-05 NOTE — MR AVS SNAPSHOT
MRN:[id number]                      After Visit Summary   4/5/2018    Itzel Zacarias    MRN: [id number]           Visit Information        Provider Department      4/5/2018 9:00 AM Rodolfo Jose, [facility]      Your next 10 appointments already scheduled     Apr 19, 2018  8:00 AM CDT   Return Visit with Rodolfohelder Wrenriman [facility] ([facility])    [address]  [place] [address]   [phone]            Apr 25, 2018 12:00 PM CDT   Return Visit with Rodolfo Wrenriman, [facility] [place] ([facility])    [address]  [place] [address]   [phone]            May 02, 2018 12:00 PM CDT   Return Visit with Rodolfo Choco Searsman [facility] ([facility])    [address] [place] [address]  [place] [address]   [phone]              Care EveryWhere ID     This is your Care EveryWhere ID. This could be used by other organizations to access your [facility] medical records  [id number]        Equal Access to Services     PETER CAMARILLO AH: Hadii lori duarte hadfedericoo Sodonellali, waaxda luqadaha, qaybta kaalmada adeegyada, carine pimentel. So [facility] [phone].    ATENCIÓN: Si habla español, tiene a sharpe disposición servicios gratuitos de asistencia lingüística. Lljosué al [phone].    We comply with applicable federal civil rights laws and [place] laws. We do not discriminate on the basis of race, color, national origin, age, disability, sex, sexual orientation, or gender identity.

## 2018-04-05 NOTE — TELEPHONE ENCOUNTER
Reason for Call:  Other call back    Detailed comments: patient would like a referral for the care coordinator here at [facility] and then a call back so she can make an appt    Phone Number Patient can be reached at: Home number on file [phone] (home)    Best Time: anytime    Can we leave a detailed message on this number? YES    Call taken on 4/5/2018 at 10:02 AM by Iman Zuleta

## 2018-04-06 ASSESSMENT — PATIENT HEALTH QUESTIONNAIRE - PHQ9: SUM OF ALL RESPONSES TO PHQ QUESTIONS 1-9: 11

## 2018-04-06 ASSESSMENT — ANXIETY QUESTIONNAIRES: GAD7 TOTAL SCORE: 10

## 2018-04-09 NOTE — TELEPHONE ENCOUNTER
Spoke with Care coordination, they are able to help patient with this and have resources.  Referral placed. Detailed message left for patient advising that order was placed and to call back RN hotline if any questions.     Christie Olmos RN

## 2018-04-12 ENCOUNTER — PATIENT OUTREACH (OUTPATIENT)
Dept: CARE COORDINATION | Facility: CLINIC | Age: 53
End: 2018-04-12

## 2018-04-12 NOTE — LETTER
[facility]  [address] [place] [address]      April 18, 2018    Itzel Zacarias  [address]    [facility] [id number]      Dear Itzel,    I am a clinic care coordinator who works with LIZZETTE VALERIO MD at the [facility]. I recently tried to call and was unable to reach you. I wanted to introduce myself and provide you with my contact information so that you can call me with questions or concerns about your health care. Below is a description of clinic care coordination and how I can further assist you.     The clinic care coordinator is a registered nurse and/or  who understand the health care system. The goal of clinic care coordination is to help you manage your health and improve access to the [facility] system in the most efficient manner. The registered nurse can assist you in meeting your health care goals by providing education, coordinating services, and strengthening the communication among your providers. The  can assist you with financial, behavioral, psychosocial, chemical dependency, counseling, and/or psychiatric resources.    Please feel free to contact me at [phone], with any questions or concerns. We at [facility] are focused on providing you with the highest-quality healthcare experience possible and that all starts with you.     Sincerely,       HINA Dumont   Clinical Care Coordinator  [phone]

## 2018-04-12 NOTE — PROGRESS NOTES
Clinic Care Coordination Contact 4/12/18  [facility]/[employer]-       Clinical Data: Care Coordinator Outreach to offer community resources for computer skills, etc.   Outreach attempted x 1.  Left message on voicemail with call back information and requested return call.  Plan:  Care Coordinator will try to reach patient again in 3-5 business days.    Aliya Gray [facility]  Care Coordinator Social Work    [facility]  [phone]  4/12/2018 9:22 AM

## 2018-04-18 NOTE — PROGRESS NOTES
Clinic Care Coordination Contact 4/18/18  [facility]/Voicemail-       Clinical Data: Care Coordinator Outreach  Outreach attempted x 2.  Left message on [employer] with call back information and requested return call.  Plan: Care Coordinator mailed out care coordination introduction letter on 4/18/18. Care Coordinator will do no further outreaches at this time.    HINA Dumont  Care Coordinator Social Work    [facility]  [phone]  4/18/2018 2:59 PM

## 2018-04-19 ENCOUNTER — OFFICE VISIT (OUTPATIENT)
Dept: PSYCHOLOGY | Facility: CLINIC | Age: 53
End: 2018-04-19
Payer: COMMERCIAL

## 2018-04-19 DIAGNOSIS — F34.1 PERSISTENT DEPRESSIVE DISORDER: ICD-10-CM

## 2018-04-19 DIAGNOSIS — F41.1 GENERALIZED ANXIETY DISORDER: Primary | ICD-10-CM

## 2018-04-19 PROCEDURE — 90791 PSYCH DIAGNOSTIC EVALUATION: CPT | Performed by: MARRIAGE & FAMILY THERAPIST

## 2018-04-19 ASSESSMENT — ANXIETY QUESTIONNAIRES
2. NOT BEING ABLE TO STOP OR CONTROL WORRYING: MORE THAN HALF THE DAYS
3. WORRYING TOO MUCH ABOUT DIFFERENT THINGS: MORE THAN HALF THE DAYS
1. FEELING NERVOUS, ANXIOUS, OR ON EDGE: SEVERAL DAYS
5. BEING SO RESTLESS THAT IT IS HARD TO SIT STILL: NOT AT ALL
7. FEELING AFRAID AS IF SOMETHING AWFUL MIGHT HAPPEN: SEVERAL DAYS
6. BECOMING EASILY ANNOYED OR IRRITABLE: NOT AT ALL
IF YOU CHECKED OFF ANY PROBLEMS ON THIS QUESTIONNAIRE, HOW DIFFICULT HAVE THESE PROBLEMS MADE IT FOR YOU TO DO YOUR WORK, TAKE CARE OF THINGS AT HOME, OR GET ALONG WITH OTHER PEOPLE: SOMEWHAT DIFFICULT
GAD7 TOTAL SCORE: 6

## 2018-04-19 ASSESSMENT — PATIENT HEALTH QUESTIONNAIRE - PHQ9: 5. POOR APPETITE OR OVEREATING: NOT AT ALL

## 2018-04-19 NOTE — Clinical Note
"Greetings, Anup Nagel and Stephania!  I met with Itzel for Diagnostic Assessment/therapy intake.  Plan for therapy will be to focus on mood improvement and increasing calm mindset.  MUKESH--she informed me that she stopped taking her Zoloft as it \"made me feel sick,\" so I referred her back to you for medication evaluation.  She also reported nutritional concerns, so I referred her to discuss those, along with possible referral to health , with you as well.  Thank you for the referral!"
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
## 2018-04-19 NOTE — MR AVS SNAPSHOT
MRN:[id number]                      After Visit Summary   4/19/2018    Itzel Zacarias    MRN: [id number]           Visit Information        Provider Department      4/19/2018 8:00 AM Damon Rodolfohelder Wilhelm, [facility] Generic      Your next 10 appointments already scheduled     Apr 25, 2018 12:00 PM CDT   Return Visit with Rodolfo Jose [facility] ([facility])    [address] [place] [address]   [phone]            May 02, 2018 12:00 PM CDT   Return Visit with Rodolfo Jose [facility] ([facility])    [address] [address]   [phone]              Care EveryWhere ID     This is your Care EveryWhere ID. This could be used by other organizations to access your [facility] medical records  [id number]        Equal Access to Services     PETER CAMARILLO AH: Hadii aad ku hadasho Soomaali, waaxda luqadaha, qaybta kaalmada adeegyada, waxay eben pimentel. So [facility] [phone].    ATENCIÓN: Si habla español, tiene a sharpe disposición servicios gratuitos de asistencia lingüística. Llame al [phone].    We comply with applicable federal civil rights laws and [place] laws. We do not discriminate on the basis of race, color, national origin, age, disability, sex, sexual orientation, or gender identity.

## 2018-04-19 NOTE — Clinical Note
Greetings, Anup Nagel and Stephania!  I met with Itzel for Diagnostic Assessment/therapy intake.  Plan for therapy will be to focus on mood improvement and increasing calm mindset.  MUKESH--she reported that she stopped taking

## 2018-04-20 ASSESSMENT — PATIENT HEALTH QUESTIONNAIRE - PHQ9: SUM OF ALL RESPONSES TO PHQ QUESTIONS 1-9: 8

## 2018-04-20 ASSESSMENT — ANXIETY QUESTIONNAIRES: GAD7 TOTAL SCORE: 6

## 2018-04-25 ENCOUNTER — OFFICE VISIT (OUTPATIENT)
Dept: PSYCHOLOGY | Facility: CLINIC | Age: 53
End: 2018-04-25
Payer: COMMERCIAL

## 2018-04-25 DIAGNOSIS — F34.1 PERSISTENT DEPRESSIVE DISORDER: Primary | ICD-10-CM

## 2018-04-25 DIAGNOSIS — F41.1 GENERALIZED ANXIETY DISORDER: ICD-10-CM

## 2018-04-25 PROCEDURE — 90834 PSYTX W PT 45 MINUTES: CPT | Performed by: MARRIAGE & FAMILY THERAPIST

## 2018-04-25 ASSESSMENT — ANXIETY QUESTIONNAIRES
1. FEELING NERVOUS, ANXIOUS, OR ON EDGE: SEVERAL DAYS
2. NOT BEING ABLE TO STOP OR CONTROL WORRYING: SEVERAL DAYS
6. BECOMING EASILY ANNOYED OR IRRITABLE: NOT AT ALL
7. FEELING AFRAID AS IF SOMETHING AWFUL MIGHT HAPPEN: SEVERAL DAYS
5. BEING SO RESTLESS THAT IT IS HARD TO SIT STILL: NOT AT ALL
IF YOU CHECKED OFF ANY PROBLEMS ON THIS QUESTIONNAIRE, HOW DIFFICULT HAVE THESE PROBLEMS MADE IT FOR YOU TO DO YOUR WORK, TAKE CARE OF THINGS AT HOME, OR GET ALONG WITH OTHER PEOPLE: SOMEWHAT DIFFICULT
GAD7 TOTAL SCORE: 4
3. WORRYING TOO MUCH ABOUT DIFFERENT THINGS: SEVERAL DAYS

## 2018-04-25 ASSESSMENT — PATIENT HEALTH QUESTIONNAIRE - PHQ9: 5. POOR APPETITE OR OVEREATING: NOT AT ALL

## 2018-04-25 NOTE — PROGRESS NOTES
Progress Note    Client Name: Itzel Zacarias  Date: 4/25/18         Service Type: Individual      Session Start Time: 12:05  Session End Time: 12:57      Session Length: 38-52     Session #: 3     Attendees: Client attended alone    Treatment Plan Last Reviewed: N/A, next due 7/25/18.  PHQ-9 / DORYS-7 : completed.     DATA      Progress Since Last Session (Related to Symptoms / Goals / Homework):   Symptoms: Improved    Homework: Achieved / completed to satisfaction  Completed in session      Episode of Care Goals: Satisfactory progress - PREPARATION (Decided to change - considering how); Intervened by negotiating a change plan and determining options / strategies for behavior change, identifying triggers, exploring social supports, and working towards setting a date to begin behavior change     Current / Ongoing Stressors and Concerns:   Client reported that she is anxious about wanting to improve her job performance.  Client identified that her poor sleep and anxiety contribute to her work struggles.  Client identified wanting to work on improving sleep and increasing calm mindset as her goals for therapy.     Treatment Objective(s) Addressed in This Session:   identify 2 sleep hygiene practices  Client identified he desired goals for therapy.     Intervention:   Motivational Interviewing: used circular/Socratic questioning to elicit client's identification of her desired therapy goals.  Solution Focused: taught/reviewed with client 2 sleep hygiene practices.        ASSESSMENT: Current Emotional / Mental Status (status of significant symptoms):   Risk status (Self / Other harm or suicidal ideation)   Client denies current fears or concerns for personal safety.   Client denies current or recent suicidal ideation or behaviors.   Client denies current or recent homicidal ideation or behaviors.   Client denies current or recent self injurious behavior or  ideation.   Client denies other safety concerns.   A safety and risk management plan has not been developed at this time, however client was given the after-hours number / 911 should there be a change in any of these risk factors.     Appearance:   Appropriate    Eye Contact:   Good    Psychomotor Behavior: Normal    Attitude:   Cooperative    Orientation:   All   Speech    Rate / Production: Normal     Volume:  Normal    Mood:    Anxious  Depressed  Normal   Affect:    Appropriate  Worrisome    Thought Content:  Clear    Thought Form:  Coherent  Logical    Insight:    Fair      Medication Review:   No changes to current psychiatric medication(s)     Medication Compliance:   No     Changes in Health Issues:   None reported     Chemical Use Review:   Substance Use: Chemical use reviewed, no active concerns identified      Tobacco Use: No current tobacco use.       Collateral Reports Completed:   Not Applicable    PLAN: (Client Tasks / Therapist Tasks / Other)  Client agreed to work on using taught/reviewed sleep hygiene practices (decreasing screen time and counting breath) to improve her sleep between now and follow-up session next week.        Rodolfo Jose, [facility]                                                         ________________________________________________________________________    Treatment Plan    Client's Name: Itzel Zacarias  YOB: 1965    Date: 4/25/18    DSM-V Diagnoses: 300.4 (F34.1) Persistent Depressive Disorder, Late onset or 300.02 (F41.1) Generalized Anxiety Disorder  Psychosocial / Contextual Factors: ongoing grief related to separation from  5 years ago, lack of employment  WHODAS: 18    Referral / Collaboration:  Referral to another professional/service is not indicated at this time..    Anticipated number of session or this episode of care: 11-20      MeasurableTreatment Goal(s) related to diagnosis / functional impairment(s)  Goal 1: Client will improve  sleep by 2 points on a 1-10 Likert scale per self-report (10 = optimal sleep).    I will know I've met my goal when I am getting regular, better sleep.      Objective #A (Client Action)    Client will identify 2 sleep hygiene practices.  Status: New - Date: 4/25/18     Intervention(s)  Therapist will teach sleep hygiene practices.    Objective #B  Client will sleep at least 7.5 hours per night for each night.  Status: New - Date: 4/25/18     Intervention(s)  Therapist will sleep hygiene practices.    Goal 2: Client will increase overall baseline calm mindset by 2 points on a 1-10 Likert scale per self-report (10 = optimal calm mindset).    I will know I've met my goal when I feel less anxious.      Objective #A (Client Action)    Status: New - Date: 4/25/18     Client will use cognitive strategies identified in therapy to challenge anxious thoughts.    Intervention(s)  Therapist will teach emotional regulation skills. CBT/REBT ABCD model.    Objective #B  Client will use at least 2 new coping skills for anxiety management in the next 12 weeks.    Status: New - Date: 4/25/18     Intervention(s)  Therapist will teach emotional regulation skills. DBT Core Mindfulness, Distress Tolerance, Emotion Regulation, and Interpersonal Effectiveness skills.    Client has reviewed and agreed to the above plan.      MARGUERITE Davis  April 25, 2018

## 2018-04-25 NOTE — MR AVS SNAPSHOT
MRN:[id number]                      After Visit Summary   4/25/2018    Itzel Zacarias    MRN: [id number]           Visit Information        Provider Department      4/25/2018 12:00 PM Rodolfo Jose, [facility] Generic      Your next 10 appointments already scheduled     May 02, 2018 12:00 PM CDT   Return Visit with Rodolfo Wrenriman [facility] ([facility])    [address]  [place] [address]   [phone]            May 15, 2018  1:00 PM CDT   Return Visit with Rodolfo Jose [facility] [place] ([facility])    [address]  [place] [address]   [phone]            May 21, 2018 11:00 AM CDT   Return Visit with Rodolfo Choco Damon [facility] ([facility])    [address] [place] [address]  [place] [address]   [phone]              Care EveryWhere ID     This is your Care EveryWhere ID. This could be used by other organizations to access your [facility] medical records  [id number]        Equal Access to Services     PETER CAMARILLO AH: Hadii lori duarte hadfedericoo Sodonellali, waaxda luqadaha, qaybta kaalmada adeegyada, carine pimentel. So [facility] [phone].    ATENCIÓN: Si habla español, tiene a sharpe disposición servicios gratuitos de asistencia lingüística. Lljosué al [phone].    We comply with applicable federal civil rights laws and [place] laws. We do not discriminate on the basis of race, color, national origin, age, disability, sex, sexual orientation, or gender identity.

## 2018-04-26 ASSESSMENT — ANXIETY QUESTIONNAIRES: GAD7 TOTAL SCORE: 4

## 2018-04-26 ASSESSMENT — PATIENT HEALTH QUESTIONNAIRE - PHQ9: SUM OF ALL RESPONSES TO PHQ QUESTIONS 1-9: 6

## 2018-05-15 ENCOUNTER — OFFICE VISIT (OUTPATIENT)
Dept: PSYCHOLOGY | Facility: CLINIC | Age: 53
End: 2018-05-15
Payer: COMMERCIAL

## 2018-05-15 DIAGNOSIS — F41.1 GENERALIZED ANXIETY DISORDER: Primary | ICD-10-CM

## 2018-05-15 DIAGNOSIS — F34.1 PERSISTENT DEPRESSIVE DISORDER: ICD-10-CM

## 2018-05-15 PROCEDURE — 90834 PSYTX W PT 45 MINUTES: CPT | Performed by: MARRIAGE & FAMILY THERAPIST

## 2018-05-15 ASSESSMENT — ANXIETY QUESTIONNAIRES
3. WORRYING TOO MUCH ABOUT DIFFERENT THINGS: SEVERAL DAYS
GAD7 TOTAL SCORE: 4
IF YOU CHECKED OFF ANY PROBLEMS ON THIS QUESTIONNAIRE, HOW DIFFICULT HAVE THESE PROBLEMS MADE IT FOR YOU TO DO YOUR WORK, TAKE CARE OF THINGS AT HOME, OR GET ALONG WITH OTHER PEOPLE: SOMEWHAT DIFFICULT
2. NOT BEING ABLE TO STOP OR CONTROL WORRYING: SEVERAL DAYS
5. BEING SO RESTLESS THAT IT IS HARD TO SIT STILL: NOT AT ALL
1. FEELING NERVOUS, ANXIOUS, OR ON EDGE: SEVERAL DAYS
7. FEELING AFRAID AS IF SOMETHING AWFUL MIGHT HAPPEN: SEVERAL DAYS
6. BECOMING EASILY ANNOYED OR IRRITABLE: NOT AT ALL

## 2018-05-15 ASSESSMENT — PATIENT HEALTH QUESTIONNAIRE - PHQ9: 5. POOR APPETITE OR OVEREATING: NOT AT ALL

## 2018-05-15 NOTE — PROGRESS NOTES
Progress Note    Client Name: Itzel Zacarias  Date: 5/15/18         Service Type: Individual      Session Start Time: 1:02  Session End Time: 1:43      Session Length: 38-52     Session #: 4     Attendees: Client attended alone    Treatment Plan Last Reviewed: N/A, next due 7/25/18.  PHQ-9 / DORYS-7 : completed.     DATA      Progress Since Last Session (Related to Symptoms / Goals / Homework):   Symptoms: Stable    Homework: Not assessed      Episode of Care Goals: Minimal progress - PREPARATION (Decided to change - considering how); Intervened by negotiating a change plan and determining options / strategies for behavior change, identifying triggers, exploring social supports, and working towards setting a date to begin behavior change     Current / Ongoing Stressors and Concerns:   Client reported that she continues to struggle to keep pace at her job.  Client identified that she is in jeopardy of losing her car due to falling behind in her payments.  Client reported that she needs to find additional employment in order to keep up with her bills (car and increased housing costs).     Treatment Objective(s) Addressed in This Session:   use at least 2 coping skills for anxiety management in the next 12 weeks     Intervention:   Solution Focused: reviewed with client advocating with her credit union and contacting possible formal supports regarding help with her employment search.        ASSESSMENT: Current Emotional / Mental Status (status of significant symptoms):   Risk status (Self / Other harm or suicidal ideation)   Client denies current fears or concerns for personal safety.   Client denies current or recent suicidal ideation or behaviors.   Client denies current or recent homicidal ideation or behaviors.   Client denies current or recent self injurious behavior or ideation.   Client denies other safety concerns.   A safety and risk management plan has not been  developed at this time, however client was given the after-hours number / 911 should there be a change in any of these risk factors.     Appearance:   Appropriate    Eye Contact:   Good    Psychomotor Behavior: Normal    Attitude:   Cooperative    Orientation:   All   Speech    Rate / Production: Normal     Volume:  Normal    Mood:    Anxious  Depressed  Normal   Affect:    Appropriate  Worrisome    Thought Content:  Clear    Thought Form:  Coherent  Logical    Insight:    Fair      Medication Review:   No changes to current psychiatric medication(s)     Medication Compliance:   No     Changes in Health Issues:   None reported     Chemical Use Review:   Substance Use: Chemical use reviewed, no active concerns identified      Tobacco Use: No current tobacco use.       Collateral Reports Completed:   Routed note to Care Team Member(s)    PLAN: (Client Tasks / Therapist Tasks / Other)  Client agreed to work on contacting her [employer] and possible formal supports for assistance with job search between now and follow-up session next week.        Rodolfo Jose, LMFT                                                         ________________________________________________________________________    Treatment Plan    Client's Name: Itzel Zacarias  YOB: 1965    Date: 4/25/18    DSM-V Diagnoses: 300.4 (F34.1) Persistent Depressive Disorder, Late onset or 300.02 (F41.1) Generalized Anxiety Disorder  Psychosocial / Contextual Factors: ongoing grief related to separation from  5 years ago, lack of employment  WHODAS: 18    Referral / Collaboration:  Referral to another professional/service is not indicated at this time..    Anticipated number of session or this episode of care: 11-20      MeasurableTreatment Goal(s) related to diagnosis / functional impairment(s)  Goal 1: Client will improve sleep by 2 points on a 1-10 Likert scale per self-report (10 = optimal sleep).    I will know I've  met my goal when I am getting regular, better sleep.      Objective #A (Client Action)    Client will identify 2 sleep hygiene practices.  Status: New - Date: 4/25/18     Intervention(s)  Therapist will teach sleep hygiene practices.    Objective #B  Client will sleep at least 7.5 hours per night for each night.  Status: New - Date: 4/25/18     Intervention(s)  Therapist will sleep hygiene practices.    Goal 2: Client will increase overall baseline calm mindset by 2 points on a 1-10 Likert scale per self-report (10 = optimal calm mindset).    I will know I've met my goal when I feel less anxious.      Objective #A (Client Action)    Status: New - Date: 4/25/18     Client will use cognitive strategies identified in therapy to challenge anxious thoughts.    Intervention(s)  Therapist will teach emotional regulation skills. CBT/REBT ABCD model.    Objective #B  Client will use at least 2 new coping skills for anxiety management in the next 12 weeks.    Status: New - Date: 4/25/18     Intervention(s)  Therapist will teach emotional regulation skills. DBT Core Mindfulness, Distress Tolerance, Emotion Regulation, and Interpersonal Effectiveness skills.    Client has reviewed and agreed to the above plan.      Rodolfo Jose, LMFT  May 15, 2018

## 2018-05-15 NOTE — Clinical Note
Greetings Aliya!  I met with Itzel today, who reported that she had not been successful in connecting with you yet.  Just wanted to give you a heads-up that I gave her your phone number again, as well as informing her of the days you are at the [facility] in hopes that she will follow through in reaching out to you for assistance with employment search.  Thanks Aliya!

## 2018-05-16 ASSESSMENT — PATIENT HEALTH QUESTIONNAIRE - PHQ9: SUM OF ALL RESPONSES TO PHQ QUESTIONS 1-9: 4

## 2018-05-16 ASSESSMENT — ANXIETY QUESTIONNAIRES: GAD7 TOTAL SCORE: 4

## 2018-05-21 ENCOUNTER — OFFICE VISIT (OUTPATIENT)
Dept: PSYCHOLOGY | Facility: CLINIC | Age: 53
End: 2018-05-21
Payer: COMMERCIAL

## 2018-05-21 DIAGNOSIS — F41.1 GENERALIZED ANXIETY DISORDER: Primary | ICD-10-CM

## 2018-05-21 DIAGNOSIS — F34.1 PERSISTENT DEPRESSIVE DISORDER: ICD-10-CM

## 2018-05-21 PROCEDURE — 90834 PSYTX W PT 45 MINUTES: CPT | Performed by: MARRIAGE & FAMILY THERAPIST

## 2018-05-21 ASSESSMENT — ANXIETY QUESTIONNAIRES
5. BEING SO RESTLESS THAT IT IS HARD TO SIT STILL: NOT AT ALL
1. FEELING NERVOUS, ANXIOUS, OR ON EDGE: SEVERAL DAYS
3. WORRYING TOO MUCH ABOUT DIFFERENT THINGS: SEVERAL DAYS
GAD7 TOTAL SCORE: 4
IF YOU CHECKED OFF ANY PROBLEMS ON THIS QUESTIONNAIRE, HOW DIFFICULT HAVE THESE PROBLEMS MADE IT FOR YOU TO DO YOUR WORK, TAKE CARE OF THINGS AT HOME, OR GET ALONG WITH OTHER PEOPLE: SOMEWHAT DIFFICULT
7. FEELING AFRAID AS IF SOMETHING AWFUL MIGHT HAPPEN: SEVERAL DAYS
6. BECOMING EASILY ANNOYED OR IRRITABLE: NOT AT ALL
2. NOT BEING ABLE TO STOP OR CONTROL WORRYING: SEVERAL DAYS

## 2018-05-21 ASSESSMENT — PATIENT HEALTH QUESTIONNAIRE - PHQ9: 5. POOR APPETITE OR OVEREATING: NOT AT ALL

## 2018-05-22 ASSESSMENT — PATIENT HEALTH QUESTIONNAIRE - PHQ9: SUM OF ALL RESPONSES TO PHQ QUESTIONS 1-9: 4

## 2018-05-22 ASSESSMENT — ANXIETY QUESTIONNAIRES: GAD7 TOTAL SCORE: 4

## 2018-05-22 NOTE — PROGRESS NOTES
Progress Note    Client Name: Itzel Zacarias  Date: 5/21/18         Service Type: Individual      Session Start Time: 11:04  Session End Time: 11:50      Session Length: 38-52     Session #: 5     Attendees: Client attended alone    Treatment Plan Last Reviewed: N/A, next due 7/25/18.  PHQ-9 / DORYS-7 : completed.     DATA      Progress Since Last Session (Related to Symptoms / Goals / Homework):   Symptoms: Stable    Homework: Partially completed      Episode of Care Goals: Minimal progress - ACTION (Actively working towards change); Intervened by reinforcing change plan / affirming steps taken     Current / Ongoing Stressors and Concerns:   Client reported that she attempted to negotiate with her [employer] union regarding her car payments, but was unsuccessful, so she made plan to make payments to catch up which may put her in jeopardy of falling behind on other bills.  Client reported that she continues to experience anxiety at work as she is often behind and requires assistance from her supervisor.     Treatment Objective(s) Addressed in This Session:   use at least 2 coping skills for anxiety management in the next 12 weeks     Intervention:   DBT: reviewed with client keeping mindful focus on doing her job and ignoring anxiety-producing thoughts.  Solution Focused: reviewed with client plan to contact possible formal supports regarding help with her employment search.        ASSESSMENT: Current Emotional / Mental Status (status of significant symptoms):   Risk status (Self / Other harm or suicidal ideation)   Client denies current fears or concerns for personal safety.   Client denies current or recent suicidal ideation or behaviors.   Client denies current or recent homicidal ideation or behaviors.   Client denies current or recent self injurious behavior or ideation.   Client denies other safety concerns.   A safety and risk management plan has not been developed  at this time, however client was given the after-hours number / 911 should there be a change in any of these risk factors.     Appearance:   Appropriate    Eye Contact:   Good    Psychomotor Behavior: Normal    Attitude:   Cooperative    Orientation:   All   Speech    Rate / Production: Normal     Volume:  Normal    Mood:    Anxious  Depressed  Normal   Affect:    Appropriate  Worrisome    Thought Content:  Clear    Thought Form:  Coherent  Logical    Insight:    Fair      Medication Review:   No changes to current psychiatric medication(s)     Medication Compliance:   No     Changes in Health Issues:   None reported     Chemical Use Review:   Substance Use: Chemical use reviewed, no active concerns identified      Tobacco Use: No current tobacco use.       Collateral Reports Completed:   Not Applicable    PLAN: (Client Tasks / Therapist Tasks / Other)  Client agreed to work on contacting possible formal supports for assistance with job search and to mindfully focus on completing her work, ignoring anxiety-producing thoughts, between now and follow-up session in two weeks.        Rodolfo Jose, LMFT                                                         ________________________________________________________________________    Treatment Plan    Client's Name: Itzel Zacarias  YOB: 1965    Date: 4/25/18    DSM-V Diagnoses: 300.4 (F34.1) Persistent Depressive Disorder, Late onset or 300.02 (F41.1) Generalized Anxiety Disorder  Psychosocial / Contextual Factors: ongoing grief related to separation from  5 years ago, lack of employment  WHODAS: 18    Referral / Collaboration:  Referral to another professional/service is not indicated at this time..    Anticipated number of session or this episode of care: 11-20      MeasurableTreatment Goal(s) related to diagnosis / functional impairment(s)  Goal 1: Client will improve sleep by 2 points on a 1-10 Likert scale per self-report (10 =  optimal sleep).    I will know I've met my goal when I am getting regular, better sleep.      Objective #A (Client Action)    Client will identify 2 sleep hygiene practices.  Status: New - Date: 4/25/18     Intervention(s)  Therapist will teach sleep hygiene practices.    Objective #B  Client will sleep at least 7.5 hours per night for each night.  Status: New - Date: 4/25/18     Intervention(s)  Therapist will sleep hygiene practices.    Goal 2: Client will increase overall baseline calm mindset by 2 points on a 1-10 Likert scale per self-report (10 = optimal calm mindset).    I will know I've met my goal when I feel less anxious.      Objective #A (Client Action)    Status: New - Date: 4/25/18     Client will use cognitive strategies identified in therapy to challenge anxious thoughts.    Intervention(s)  Therapist will teach emotional regulation skills. CBT/REBT ABCD model.    Objective #B  Client will use at least 2 new coping skills for anxiety management in the next 12 weeks.    Status: New - Date: 4/25/18     Intervention(s)  Therapist will teach emotional regulation skills. DBT Core Mindfulness, Distress Tolerance, Emotion Regulation, and Interpersonal Effectiveness skills.    Client has reviewed and agreed to the above plan.      Rodolfo Joes, LMFT  May 21, 2018

## 2018-06-04 ENCOUNTER — OFFICE VISIT (OUTPATIENT)
Dept: PSYCHOLOGY | Facility: CLINIC | Age: 53
End: 2018-06-04
Payer: COMMERCIAL

## 2018-06-04 DIAGNOSIS — F41.1 GENERALIZED ANXIETY DISORDER: Primary | ICD-10-CM

## 2018-06-04 DIAGNOSIS — F34.1 PERSISTENT DEPRESSIVE DISORDER: ICD-10-CM

## 2018-06-04 PROCEDURE — 90834 PSYTX W PT 45 MINUTES: CPT | Performed by: MARRIAGE & FAMILY THERAPIST

## 2018-06-04 ASSESSMENT — PATIENT HEALTH QUESTIONNAIRE - PHQ9: 5. POOR APPETITE OR OVEREATING: NOT AT ALL

## 2018-06-04 ASSESSMENT — ANXIETY QUESTIONNAIRES
2. NOT BEING ABLE TO STOP OR CONTROL WORRYING: SEVERAL DAYS
GAD7 TOTAL SCORE: 4
1. FEELING NERVOUS, ANXIOUS, OR ON EDGE: SEVERAL DAYS
5. BEING SO RESTLESS THAT IT IS HARD TO SIT STILL: NOT AT ALL
7. FEELING AFRAID AS IF SOMETHING AWFUL MIGHT HAPPEN: SEVERAL DAYS
3. WORRYING TOO MUCH ABOUT DIFFERENT THINGS: SEVERAL DAYS
6. BECOMING EASILY ANNOYED OR IRRITABLE: NOT AT ALL
IF YOU CHECKED OFF ANY PROBLEMS ON THIS QUESTIONNAIRE, HOW DIFFICULT HAVE THESE PROBLEMS MADE IT FOR YOU TO DO YOUR WORK, TAKE CARE OF THINGS AT HOME, OR GET ALONG WITH OTHER PEOPLE: SOMEWHAT DIFFICULT

## 2018-06-04 NOTE — PROGRESS NOTES
"                                             Progress Note    Client Name: Itzel Zacarias  Date: 6/4/18         Service Type: Individual      Session Start Time: 12:04  Session End Time: 12:45      Session Length: 38-52     Session #: 6     Attendees: Client attended alone    Treatment Plan Last Reviewed: N/A, next due 7/25/18.  PHQ-9 / DORYS-7 : completed.     DATA      Progress Since Last Session (Related to Symptoms / Goals / Homework):   Symptoms: Stable    Homework: Partially completed      Episode of Care Goals: Minimal progress - ACTION (Actively working towards change); Intervened by reinforcing change plan / affirming steps taken     Current / Ongoing Stressors and Concerns:   Client reported that she has been working less lately, which has resulted in her not being able to catch up on her bills.  Client reported that her work is often inconsistent when it is during the \"slow\" time of the year.  Client reported that her pay rate decreased when transferring to a job within the same company, and that she hasn't addressed this with her supervisor out of fear.     Treatment Objective(s) Addressed in This Session:   learn & utilize at least 2 assertive communication skills weekly     Intervention:   Interpersonal Therapy: reviewed with client self-advocating assertively with her formal supports and her supervisor.        ASSESSMENT: Current Emotional / Mental Status (status of significant symptoms):   Risk status (Self / Other harm or suicidal ideation)   Client denies current fears or concerns for personal safety.   Client denies current or recent suicidal ideation or behaviors.   Client denies current or recent homicidal ideation or behaviors.   Client denies current or recent self injurious behavior or ideation.   Client denies other safety concerns.   A safety and risk management plan has not been developed at this time, however client was given the after-hours number / 911 should there be a change in any " of these risk factors.     Appearance:   Appropriate    Eye Contact:   Good    Psychomotor Behavior: Normal    Attitude:   Cooperative    Orientation:   All   Speech    Rate / Production: Normal     Volume:  Normal    Mood:    Anxious  Depressed  Normal   Affect:    Appropriate  Worrisome    Thought Content:  Clear    Thought Form:  Coherent  Logical    Insight:    Fair      Medication Review:   No changes to current psychiatric medication(s)     Medication Compliance:   No     Changes in Health Issues:   None reported     Chemical Use Review:   Substance Use: Chemical use reviewed, no active concerns identified      Tobacco Use: No current tobacco use.       Collateral Reports Completed:   Not Applicable    PLAN: (Client Tasks / Therapist Tasks / Other)  Client agreed to work on contacting possible formal supports for assistance with job search and contemplating contacting her supervisor regarding her pay rate assertivelybetween now and follow-up session in two weeks.        Rodolfo Jose, [facility]                                                         ________________________________________________________________________    Treatment Plan    Client's Name: Itzel Zacarias  YOB: 1965    Date: 4/25/18    DSM-V Diagnoses: 300.4 (F34.1) Persistent Depressive Disorder, Late onset or 300.02 (F41.1) Generalized Anxiety Disorder  Psychosocial / Contextual Factors: ongoing grief related to separation from  5 years ago, lack of employment  WHODAS: 18    Referral / Collaboration:  Referral to another professional/service is not indicated at this time..    Anticipated number of session or this episode of care: 11-20      MeasurableTreatment Goal(s) related to diagnosis / functional impairment(s)  Goal 1: Client will improve sleep by 2 points on a 1-10 Likert scale per self-report (10 = optimal sleep).    I will know I've met my goal when I am getting regular, better sleep.      Objective #A  (Client Action)    Client will identify 2 sleep hygiene practices.  Status: New - Date: 4/25/18     Intervention(s)  Therapist will teach sleep hygiene practices.    Objective #B  Client will sleep at least 7.5 hours per night for each night.  Status: New - Date: 4/25/18     Intervention(s)  Therapist will sleep hygiene practices.    Goal 2: Client will increase overall baseline calm mindset by 2 points on a 1-10 Likert scale per self-report (10 = optimal calm mindset).    I will know I've met my goal when I feel less anxious.      Objective #A (Client Action)    Status: New - Date: 4/25/18     Client will use cognitive strategies identified in therapy to challenge anxious thoughts.    Intervention(s)  Therapist will teach emotional regulation skills. CBT/REBT ABCD model.    Objective #B  Client will use at least 2 new coping skills for anxiety management in the next 12 weeks.    Status: New - Date: 4/25/18     Intervention(s)  Therapist will teach emotional regulation skills. DBT Core Mindfulness, Distress Tolerance, Emotion Regulation, and Interpersonal Effectiveness skills.    Client has reviewed and agreed to the above plan.      Rodolfo Jose, LMFT  June 4, 2018

## 2018-06-04 NOTE — MR AVS SNAPSHOT
MRN:[id number]                      After Visit Summary   6/4/2018    Itzel Zacarias    MRN: [id number]           Visit Information        Provider Department      6/4/2018 12:00 PM Damon Rodolfo Choco, [facility]      Your next 10 appointments already scheduled     Jun 20, 2018 11:00 AM CDT   Return Visit with Rodolfo Jose [facility]    [address] [place] [id number]   [phone]            Jul 06, 2018 12:00 PM CDT   Return Visit with Rodolfo Jose [facility]    [address] [facility] [address] [facility] [address]   [phone]              Care EveryWhere ID     This is your Care EveryWhere ID. This could be used by other organizations to access your [place] medical records  [id number]        Equal Access to Services     PETER CAMARILLO AH: Hadii aad ku hadasho Soomaali, waaxda luqadaha, qaybta kaalmada adeegyada, waxay eben pimentel. So [facility] [phone].    ATENCIÓN: Si habla español, tiene a sharpe disposición servicios gratuitos de asistencia lingüística. Llame al [phone].    We comply with applicable federal civil rights laws and [place] laws. We do not discriminate on the basis of race, color, national origin, age, disability, sex, sexual orientation, or gender identity.

## 2018-06-05 ASSESSMENT — PATIENT HEALTH QUESTIONNAIRE - PHQ9: SUM OF ALL RESPONSES TO PHQ QUESTIONS 1-9: 4

## 2018-06-05 ASSESSMENT — ANXIETY QUESTIONNAIRES: GAD7 TOTAL SCORE: 4

## 2018-06-19 ENCOUNTER — PATIENT OUTREACH (OUTPATIENT)
Dept: CARE COORDINATION | Facility: CLINIC | Age: 53
End: 2018-06-19

## 2018-06-19 NOTE — PROGRESS NOTES
Clinic Care Coordination Contact 6/19/18  Care Team Conversations-SW    Received a phone call from the pt who asked if we could meet tomorrow at the [facility] at 11:00 as she has an apt with her counselor at 12:00.     Aliya Gary, [facility]  Care Coordinator Social Work    [facility]  [phone]  6/19/2018 11:52 AM

## 2018-06-20 ENCOUNTER — OFFICE VISIT (OUTPATIENT)
Dept: PSYCHOLOGY | Facility: CLINIC | Age: 53
End: 2018-06-20
Payer: COMMERCIAL

## 2018-06-20 DIAGNOSIS — F41.1 GENERALIZED ANXIETY DISORDER: Primary | ICD-10-CM

## 2018-06-20 DIAGNOSIS — F34.1 PERSISTENT DEPRESSIVE DISORDER: ICD-10-CM

## 2018-06-20 PROCEDURE — 90834 PSYTX W PT 45 MINUTES: CPT | Performed by: MARRIAGE & FAMILY THERAPIST

## 2018-06-20 ASSESSMENT — ANXIETY QUESTIONNAIRES
2. NOT BEING ABLE TO STOP OR CONTROL WORRYING: SEVERAL DAYS
7. FEELING AFRAID AS IF SOMETHING AWFUL MIGHT HAPPEN: SEVERAL DAYS
1. FEELING NERVOUS, ANXIOUS, OR ON EDGE: SEVERAL DAYS
3. WORRYING TOO MUCH ABOUT DIFFERENT THINGS: SEVERAL DAYS
6. BECOMING EASILY ANNOYED OR IRRITABLE: NOT AT ALL
5. BEING SO RESTLESS THAT IT IS HARD TO SIT STILL: NOT AT ALL
IF YOU CHECKED OFF ANY PROBLEMS ON THIS QUESTIONNAIRE, HOW DIFFICULT HAVE THESE PROBLEMS MADE IT FOR YOU TO DO YOUR WORK, TAKE CARE OF THINGS AT HOME, OR GET ALONG WITH OTHER PEOPLE: SOMEWHAT DIFFICULT
GAD7 TOTAL SCORE: 4

## 2018-06-20 ASSESSMENT — PATIENT HEALTH QUESTIONNAIRE - PHQ9: 5. POOR APPETITE OR OVEREATING: NOT AT ALL

## 2018-06-20 NOTE — PROGRESS NOTES
Clinic Care Coordination Contact 6/20/18  Care Team Conversations-SW    Pt met with this writer in clinic today to obtain resources to learn computer skills. Pt explained that she would like to get a full time job as the job she has right now only given her around 30 hours a week. She states she does not have knowledge of computers and feels she will need some if she will be finding a new job.     Discussion with the pt about her current housing, etc. Pt rents an apt and has a roommate to share costs. 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 She states she uses the food shelves near her home, but was hesitant to apply for SNAP at this time as she felt she would be over income and states they only gave her 83 dollars a month when she was on it. I explained that I would be happy to help her fill out the application if she was interested.     We made phone calls together of different programs near her home. We enrolled her [facility] computer classes [facility] [employer] one time a month. She will go on July 5th to her first class. We also contacted the [employer] [facility] [place] who offer classes for beginner and intermediate computer classes. They don't have their July schedule posted so the pt will call to inquire this schedule. She will need to have an account on [employer] website before she can enroll.    We also left a VM for the [employer]  to call her back in regards to what programs they can offer her. She said her sister works for a temp agency and wants her to go there too. Discussion with her about talking with the [employer] about this to see if they could help her find new employment without having to take computer classes.     SW will plan on contacting the pt in approx 1 week. She agrees to call me sooner should she need assistance of have questions.     Aliya Gray, ISABELLA  Care Coordinator  Social Work    [facility] [place]  [phone]  6/20/2018 2:35 PM

## 2018-06-20 NOTE — PROGRESS NOTES
Progress Note    Client Name: Itzel Zacarias  Date: 6/20/18         Service Type: Individual      Session Start Time: 11:02  Session End Time: 11:50      Session Length: 38-52     Session #: 7     Attendees: Client attended alone    Treatment Plan Last Reviewed: N/A, next due 7/25/18.  PHQ-9 / DORYS-7 : completed.     DATA      Progress Since Last Session (Related to Symptoms / Goals / Homework):   Symptoms: Stable    Homework: Partially completed      Episode of Care Goals: Satisfactory progress - ACTION (Actively working towards change); Intervened by reinforcing change plan / affirming steps taken     Current / Ongoing Stressors and Concerns:   Client reported that her work schedule has been reduced to 3 days/week in order for her to not be let go.  Client reported that this has led her to fall further behind on her bills, which has in turn led to increased anxiety.  Client reported that she has thought about other jobs to apply for but has not followed through on applying for any of them yet, and didn't identify any specific barriers to doing so other than her current work schedule.     Treatment Objective(s) Addressed in This Session:   use at least 2 coping skills for anxiety management in the next 12 weeks     Intervention:   Solution Focused: reviewed with client her job search options and strategies to follow through with applications along with working with Care Coordination        ASSESSMENT: Current Emotional / Mental Status (status of significant symptoms):   Risk status (Self / Other harm or suicidal ideation)   Client denies current fears or concerns for personal safety.   Client denies current or recent suicidal ideation or behaviors.   Client denies current or recent homicidal ideation or behaviors.   Client denies current or recent self injurious behavior or ideation.   Client denies other safety concerns.   A safety and risk management plan has not  been developed at this time, however client was given the after-hours number / 911 should there be a change in any of these risk factors.     Appearance:   Appropriate    Eye Contact:   Good    Psychomotor Behavior: Normal    Attitude:   Cooperative    Orientation:   All   Speech    Rate / Production: Normal     Volume:  Normal    Mood:    Anxious  Depressed  Normal   Affect:    Appropriate  Worrisome    Thought Content:  Clear    Thought Form:  Coherent  Logical    Insight:    Fair      Medication Review:   No changes to current psychiatric medication(s)     Medication Compliance:   No     Changes in Health Issues:   None reported     Chemical Use Review:   Substance Use: Chemical use reviewed, no active concerns identified      Tobacco Use: No current tobacco use.       Collateral Reports Completed:   Not Applicable    PLAN: (Client Tasks / Therapist Tasks / Other)  Client agreed to work on applying for jobs and working with formal supports (i.e. Care Coordination) regarding job search between now and follow-up session in two weeks.        Rodolfo Jose, LMFT                                                         ________________________________________________________________________    Treatment Plan    Client's Name: Itzel Zacarias  YOB: 1965    Date: 4/25/18    DSM-V Diagnoses: 300.4 (F34.1) Persistent Depressive Disorder, Late onset or 300.02 (F41.1) Generalized Anxiety Disorder  Psychosocial / Contextual Factors: ongoing grief related to separation from  5 years ago, lack of employment  WHODAS: 18    Referral / Collaboration:  Referral to another professional/service is not indicated at this time..    Anticipated number of session or this episode of care: 11-20      MeasurableTreatment Goal(s) related to diagnosis / functional impairment(s)  Goal 1: Client will improve sleep by 2 points on a 1-10 Likert scale per self-report (10 = optimal sleep).    I will know I've met  my goal when I am getting regular, better sleep.      Objective #A (Client Action)    Client will identify 2 sleep hygiene practices.  Status: New - Date: 4/25/18     Intervention(s)  Therapist will teach sleep hygiene practices.    Objective #B  Client will sleep at least 7.5 hours per night for each night.  Status: New - Date: 4/25/18     Intervention(s)  Therapist will sleep hygiene practices.    Goal 2: Client will increase overall baseline calm mindset by 2 points on a 1-10 Likert scale per self-report (10 = optimal calm mindset).    I will know I've met my goal when I feel less anxious.      Objective #A (Client Action)    Status: New - Date: 4/25/18     Client will use cognitive strategies identified in therapy to challenge anxious thoughts.    Intervention(s)  Therapist will teach emotional regulation skills. CBT/REBT ABCD model.    Objective #B  Client will use at least 2 new coping skills for anxiety management in the next 12 weeks.    Status: New - Date: 4/25/18     Intervention(s)  Therapist will teach emotional regulation skills. DBT Core Mindfulness, Distress Tolerance, Emotion Regulation, and Interpersonal Effectiveness skills.    Client has reviewed and agreed to the above plan.      Rodolfo Jose, LMFT  June 20, 2018

## 2018-06-21 ASSESSMENT — PATIENT HEALTH QUESTIONNAIRE - PHQ9: SUM OF ALL RESPONSES TO PHQ QUESTIONS 1-9: 4

## 2018-06-21 ASSESSMENT — ANXIETY QUESTIONNAIRES: GAD7 TOTAL SCORE: 4

## 2018-06-28 ENCOUNTER — PATIENT OUTREACH (OUTPATIENT)
Dept: CARE COORDINATION | Facility: CLINIC | Age: 53
End: 2018-06-28

## 2018-06-28 NOTE — PROGRESS NOTES
Clinic Care Coordination Contact 6/28/18  [facility]-       Clinical Data: Care Coordinator Outreach to offer assistance to her with making account on [place] and to see if the NAI people had returned her call.   Outreach attempted x 1.  Left message on voicemail with call back information and requested return call.  Plan: Care Coordinator will try to reach patient again in 3-5 business days.    Aliya Gray [facility]  Care Coordinator Social Work    [facility]  [phone]  6/28/2018 3:19 PM
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
## 2018-06-28 NOTE — LETTER
[place] CARE COORDINATION  [address] [place] [address]      July 13, 2018    Itzel Zacarias  [address]    [place] [address]      Dear Itzel,    I am a clinic care coordinator who works with LIZZETTE VALERIO MD. I recently tried to call and was unable to reach you. I wanted to provide you with my contact information so that you can call me with questions or concerns about your health care. Below is a description of clinic care coordination and how I can further assist you.     The clinic care coordinator is a registered nurse and/or  who understand the health care system. The goal of clinic care coordination is to help you manage your health and improve access to the [facility] system in the most efficient manner. The registered nurse can assist you in meeting your health care goals by providing education, coordinating services, and strengthening the communication among your providers. The  can assist you with financial, behavioral, psychosocial, chemical dependency, counseling, and/or psychiatric resources.    Please feel free to contact me at [phone], with any questions or concerns. We at [facility] are focused on providing you with the highest-quality healthcare experience possible and that all starts with you.     Sincerely,       Aliya Gray [facility]   Clinic Care Coordinator  [phone]    Enclosed: I have enclosed a copy of a 24 Hour Access Plan. This has helpful phone numbers for you to call when needed. Please keep this in an easy to access place to use as needed.

## 2018-07-13 NOTE — PROGRESS NOTES
Clinic Care Coordination Contact 7/13/18  [facility]/Vo[employer]-       Clinical Data: Care Coordinator Outreach  Outreach attempted x 2.  Left message on [employer] with call back information and requested return call.  Plan: Care Coordinator mailed out care coordination introduction letter on 7/13/18. Care Coordinator will do no further outreaches at this time.    HINA Dumont  Care Coordinator Social Work    [facility]  [phone]  7/13/2018 12:45 PM

## 2018-09-17 ENCOUNTER — TELEPHONE (OUTPATIENT)
Dept: FAMILY MEDICINE | Facility: CLINIC | Age: 53
End: 2018-09-17

## 2018-09-17 DIAGNOSIS — J30.2 SEASONAL ALLERGIC RHINITIS, UNSPECIFIED CHRONICITY, UNSPECIFIED TRIGGER: ICD-10-CM

## 2018-09-17 DIAGNOSIS — J45.30 MILD PERSISTENT ASTHMA WITHOUT COMPLICATION: ICD-10-CM

## 2018-09-17 RX ORDER — FLUTICASONE PROPIONATE 50 MCG
SPRAY, SUSPENSION (ML) NASAL
Qty: 16 G | Refills: 11 | Status: SHIPPED | OUTPATIENT
Start: 2018-09-17 | End: 2019-09-26

## 2018-09-17 RX ORDER — ALBUTEROL SULFATE 90 UG/1
AEROSOL, METERED RESPIRATORY (INHALATION)
Qty: 18 G | Refills: 11 | Status: SHIPPED | OUTPATIENT
Start: 2018-09-17 | End: 2021-06-30

## 2018-09-17 NOTE — PROGRESS NOTES
"     SUBJECTIVE:   CC: Itzel Zacarias is an 52 year old woman who presents for preventive health visit.     Healthy Habits:    Do you get at least three servings of calcium containing foods daily (dairy, green leafy vegetables, etc.)? {YES/NO, DAIRY INTAKE:727132::\"yes\"}    Amount of exercise or daily activities, outside of work: {AMOUNT EXERCISE:617572}    Problems taking medications regularly {Yes /No default:232562::\"No\"}    Medication side effects: {Yes /No default.:911099::\"No\"}    Have you had an eye exam in the past two years? {YESNOBLANK:810832}    Do you see a dentist twice per year? {YESNOBLANK:452997}    Do you have sleep apnea, excessive snoring or daytime drowsiness?{YESNOBLANK:726112}  {Outside tests to abstract? :722653}    {additional problems to add (Optional):007223}    Today's PHQ-2 Score:   PHQ-2 ( 1999 Pfizer) 1/30/2018 8/2/2017   Q1: Little interest or pleasure in doing things 0 0   Q2: Feeling down, depressed or hopeless 3 2   PHQ-2 Score 3 2     {PHQ-2 LOOK IN ASSESSMENTS (Optional) :830031}  Abuse: Current or Past(Physical, Sexual or Emotional)- {YES/NO/NA:573083}  Do you feel safe in your environment - {YES/NO/NA:231894}    Social History   Substance Use Topics     Smoking status: Never Smoker     Smokeless tobacco: Never Used     Alcohol use Yes     If you drink alcohol do you typically have >3 drinks per day or >7 drinks per week? {ETOH :548246}                     Reviewed orders with patient.  Reviewed health maintenance and updated orders accordingly - {Yes/No:293362::\"Yes\"}  {Chronicprobdata (Optional):809369}    {Mammo Decision Support (Optional):909264}    Pertinent mammograms are reviewed under the imaging tab.  History of abnormal Pap smear: {PAP HX:741906}  PAP / HPV Latest Ref Rng & Units 8/2/2017 2/28/2013   PAP - NIL ASC-US(A)   HPV 16 DNA NEG Negative -   HPV 18 DNA NEG Negative -   OTHER HR HPV NEG Negative -     Reviewed and updated as needed this visit by clinical " "staff         Reviewed and updated as needed this visit by Provider        {HISTORY OPTIONS (Optional):537997}    ROS:  { :091126}    OBJECTIVE:   There were no vitals taken for this visit.  EXAM:  {Exam Choices:490650}    {Diagnostic Test Results (Optional):558630::\"Diagnostic Test Results:\",\"none \"}    ASSESSMENT/PLAN:   {Diag Picklist:289085}    COUNSELING:   {FEMALE COUNSELING MESSAGES:084867::\"Reviewed preventive health counseling, as reflected in patient instructions\"}    BP Readings from Last 1 Encounters:   01/30/18 116/80     Estimated body mass index is 32.69 kg/(m^2) as calculated from the following:    Height as of 1/30/18: 5' 7.52\" (1.715 m).    Weight as of 1/30/18: 212 lb (96.2 kg).    {BP Counseling- Complete if BP >= 120/80  (Optional):516022}  {Weight Management Plan (ACO) Complete if BMI is abnormal-  Ages 18-64  BMI >24.9.  Age 65+ with BMI <23 or >30 (Optional):960827}     reports that she has never smoked. She has never used smokeless tobacco.  {Tobacco Cessation -- Complete if patient is a smoker (Optional):036651}    Counseling Resources:  ATP IV Guidelines  Pooled Cohorts Equation Calculator  Breast Cancer Risk Calculator  FRAX Risk Assessment  ICSI Preventive Guidelines  Dietary Guidelines for Americans, 2010  USDA's MyPlate  ASA Prophylaxis  Lung CA Screening    LIZZETTE VALERIO MD  [facility]  "

## 2018-09-17 NOTE — TELEPHONE ENCOUNTER
"Pending Prescriptions:                       Disp   Refills    VENTOLIN  (90 Base) MCG/ACT inhale*18 g   11           Sig: INHALE TWO PUFFS BY MOUTH EVERY 6 HOURS AS NEEDED    fluticasone (FLONASE) 50 MCG/ACT spray [P*16 g   11           Sig: USE TWO SPRAYS IN EACH NOSTRIL EVERY DAY    Failed [facility] refill protocol, see below:    Requested Prescriptions   Pending Prescriptions Disp Refills     VENTOLIN  (90 Base) MCG/ACT inhaler [Pharmacy Med Name: VENTOLIN HFA 108MCG/ACT AERS] 18 g 11     Sig: INHALE TWO PUFFS BY MOUTH EVERY 6 HOURS AS NEEDED    Asthma Maintenance Inhalers - Anticholinergics Failed    9/17/2018  9:23 AM       Failed - Asthma control assessment score within normal limits in last 6 months    Please review ACT score.          Passed - Patient is age 12 years or older       Passed - Recent (6 mo) or future (30 days) visit within the authorizing provider's specialty    Patient had office visit in the last 6 months or has a visit in the next 30 days with authorizing provider or within the authorizing provider's specialty.  See \"Patient Info\" tab in incentrosesket, or \"Choose Columns\" in Meds & Orders section of the refill encounter.            fluticasone (FLONASE) 50 MCG/ACT spray [Pharmacy Med Name: FLUTICASONE PROPIONATE 50 SUSP] 16 g 11     Sig: USE TWO SPRAYS IN EACH NOSTRIL EVERY DAY    Inhaled Steroids Protocol Failed    9/17/2018  9:23 AM       Failed - Asthma control assessment score within normal limits in last 6 months    Please review ACT score.          Passed - Patient is age 12 or older       Passed - Recent (6 mo) or future (30 days) visit within the authorizing provider's specialty    Patient had office visit in the last 6 months or has a visit in the next 30 days with authorizing provider or within the authorizing provider's specialty.  See \"Patient Info\" tab in inbasket, or \"Choose Columns\" in Meds & Orders section of the refill encounter.            Routing refill request to " provider for review/approval because:  Last ACT was 17 on 1/25/2018    Kayleigh Munroe RN

## 2018-09-17 NOTE — TELEPHONE ENCOUNTER
Panel Management Review      Patient has the following on her problem list:     Asthma review     ACT Total Scores 1/25/2018   ACT TOTAL SCORE -   ASTHMA ER VISITS -   ASTHMA HOSPITALIZATIONS -   ACT TOTAL SCORE (Goal Greater than or Equal to 20) 17   In the past 12 months, how many times did you visit the emergency room for your asthma without being admitted to the hospital? 0   In the past 12 months, how many times were you hospitalized overnight because of your asthma? 0      1. Is Asthma diagnosis on the Problem List? Yes    2. Is Asthma listed on Health Maintenance? Yes    3. Patient is due for:  ACT and AAP      Composite cancer screening  Chart review shows that this patient is due/due soon for the following Colonoscopy  Summary:    Patient is due/failing the following:   A1C and AAP    Action needed:   None. Patient have an appointment with Dr. Nagel on 9/20/2018.      Type of outreach:    none    Questions for provider review:    None                                                                                                                                    Adry Tanner MA       Chart routed to none .

## 2018-09-17 NOTE — PATIENT INSTRUCTIONS
Preventive Health Recommendations  Female Ages 50 - 64    Yearly exam: See your health care provider every year in order to  o Review health changes.   o Discuss preventive care.    o Review your medicines if your doctor has prescribed any.      Get a Pap test every three years (unless you have an abnormal result and your provider advises testing more often).    If you get Pap tests with HPV test, you only need to test every 5 years, unless you have an abnormal result.     You do not need a Pap test if your uterus was removed (hysterectomy) and you have not had cancer.    You should be tested each year for STDs (sexually transmitted diseases) if you're at risk.     Have a mammogram every 1 to 2 years.    Have a colonoscopy at age 50, or have a yearly FIT test (stool test). These exams screen for colon cancer.      Have a cholesterol test every 5 years, or more often if advised.    Have a diabetes test (fasting glucose) every three years. If you are at risk for diabetes, you should have this test more often.     If you are at risk for osteoporosis (brittle bone disease), think about having a bone density scan (DEXA).    Shots: Get a flu shot each year. Get a tetanus shot every 10 years.    Nutrition:     Eat at least 5 servings of fruits and vegetables each day.    Eat whole-grain bread, whole-wheat pasta and brown rice instead of white grains and rice.    Get adequate Calcium and Vitamin D.     Lifestyle    Exercise at least 150 minutes a week (30 minutes a day, 5 days a week). This will help you control your weight and prevent disease.    Limit alcohol to one drink per day.    No smoking.     Wear sunscreen to prevent skin cancer.     See your dentist every six months for an exam and cleaning.    See your eye doctor every 1 to 2 years.  [facility]    If you have any questions regarding to your visit please contact your care team:       Team Purple:   Clinic Hours Telephone Number   Dr. Taylor  Shona Higginbotham   7am-7pm  Monday - Thursday   7am-5pm  Fridays  [phone]  (Appointment scheduling available 24/7)   Urgent Care - [place] - 11am-9pm Monday-Friday Saturday-Sunday- 9am-5pm   [place] - 5pm-9pm Monday-Friday Saturday-Sunday- 9am-5pm  [phone] - [place]  [phone] - [place]       What options do I have for a visit other than an office visit? We offer electronic visits (e-visits) and telephone visits, when medically appropriate.  Please check with your medical insurance to see if these types of visits are covered, as you will be responsible for any charges that are not paid by your insurance.      You can use [employer] (secure electronic communication) to access to your chart, send your primary care provider a message, or make an appointment. Ask a team member how to get started.     For a price quote for your services, please call our Consumer Price Line at [phone] or our Imaging Cost estimation line at [phone] (for imaging tests).

## 2018-09-20 ENCOUNTER — OFFICE VISIT (OUTPATIENT)
Dept: FAMILY MEDICINE | Facility: CLINIC | Age: 53
End: 2018-09-20
Payer: COMMERCIAL

## 2018-09-20 VITALS
BODY MASS INDEX: 31.96 KG/M2 | HEIGHT: 67 IN | OXYGEN SATURATION: 97 % | WEIGHT: 203.6 LBS | TEMPERATURE: 98.1 F | DIASTOLIC BLOOD PRESSURE: 84 MMHG | RESPIRATION RATE: 20 BRPM | HEART RATE: 67 BPM | SYSTOLIC BLOOD PRESSURE: 136 MMHG

## 2018-09-20 DIAGNOSIS — Z12.31 ENCOUNTER FOR SCREENING MAMMOGRAM FOR BREAST CANCER: ICD-10-CM

## 2018-09-20 DIAGNOSIS — Z00.00 ENCOUNTER FOR ROUTINE ADULT HEALTH EXAMINATION WITHOUT ABNORMAL FINDINGS: Primary | ICD-10-CM

## 2018-09-20 DIAGNOSIS — Z23 NEED FOR PROPHYLACTIC VACCINATION AND INOCULATION AGAINST INFLUENZA: ICD-10-CM

## 2018-09-20 DIAGNOSIS — Z12.11 SCREEN FOR COLON CANCER: ICD-10-CM

## 2018-09-20 PROCEDURE — 99396 PREV VISIT EST AGE 40-64: CPT | Mod: 25 | Performed by: FAMILY MEDICINE

## 2018-09-20 PROCEDURE — 90471 IMMUNIZATION ADMIN: CPT | Performed by: FAMILY MEDICINE

## 2018-09-20 PROCEDURE — 90682 RIV4 VACC RECOMBINANT DNA IM: CPT | Performed by: FAMILY MEDICINE

## 2018-09-20 NOTE — PROGRESS NOTES
"   SUBJECTIVE:   CC: Itzel Zacarias is an 52 year old woman who presents for preventive health visit.     Physical     {Outside tests to abstract? :827969}    {additional problems to add (Optional):265324}    Today's PHQ-2 Score:   PHQ-2 ( 1999 Pfizer) 1/30/2018   Q1: Little interest or pleasure in doing things 0   Q2: Feeling down, depressed or hopeless 3   PHQ-2 Score 3       Abuse: Current or Past(Physical, Sexual or Emotional)- {YES/NO/NA:716061}  Do you feel safe in your environment - {YES/NO/NA:681941}    Social History   Substance Use Topics     Smoking status: Never Smoker     Smokeless tobacco: Never Used     Alcohol use Yes     No flowsheet data found.{add AUDIT responses (Optional) (A score of 7 for adult men is an indication of hazardous drinking; a score of 8 or more is an indication of an alcohol use disorder.  A score of 7 or more for adult women is an indication of hazardous drinking or an alchohol use disorder):568814}    Reviewed orders with patient.  Reviewed health maintenance and updated orders accordingly - {Yes/No:292414::\"Yes\"}  {Chronicprobdata (Optional):517008}    {Mammo Decision Support (Optional):879496}    Pertinent mammograms are reviewed under the imaging tab.  History of abnormal Pap smear: {PAP HX:737103}  PAP / HPV Latest Ref Rng & Units 8/2/2017 2/28/2013   PAP - NIL ASC-US(A)   HPV 16 DNA NEG Negative -   HPV 18 DNA NEG Negative -   OTHER HR HPV NEG Negative -     Reviewed and updated as needed this visit by clinical staff  Tobacco  Allergies  Meds  Med Hx  Surg Hx  Fam Hx  Soc Hx        Reviewed and updated as needed this visit by Provider        {HISTORY OPTIONS (Optional):917963}    Review of Systems  {FEMALE ROS (Optional):630412}     OBJECTIVE:   /72  Pulse 67  Temp 98.1  F (36.7  C) (Oral)  Resp 20  Ht 5' 7.4\" (1.712 m)  Wt 203 lb 9.6 oz (92.4 kg)  SpO2 97%  BMI 31.51 kg/m2  Physical Exam  {Exam Choices (Optional):943926}    {Diagnostic Test Results " "(Optional):929117::\"Diagnostic Test Results:\",\"none \"}    ASSESSMENT/PLAN:   {Diag Picklist:196622}    COUNSELING:  {FEMALE COUNSELING MESSAGES:829431::\"Reviewed preventive health counseling, as reflected in patient instructions\"}    BP Readings from Last 1 Encounters:   09/20/18 144/72     Estimated body mass index is 31.51 kg/(m^2) as calculated from the following:    Height as of this encounter: 5' 7.4\" (1.712 m).    Weight as of this encounter: 203 lb 9.6 oz (92.4 kg).    {BP Counseling- Complete if BP >= 120/80  (Optional):982850}  {Weight Management Plan (ACO) Complete if BMI is abnormal-  Ages 18-64  BMI >24.9.  Age 65+ with BMI <23 or >30 (Optional):227208}     reports that she has never smoked. She has never used smokeless tobacco.  {Tobacco Cessation -- Complete if patient is a smoker (Optional):393181}    Counseling Resources:  ATP IV Guidelines  Pooled Cohorts Equation Calculator  Breast Cancer Risk Calculator  FRAX Risk Assessment  ICSI Preventive Guidelines  Dietary Guidelines for Americans, 2010  USDA's MyPlate  ASA Prophylaxis  Lung CA Screening    LIZZETTE VALERIO MD  [facility]  "

## 2018-09-20 NOTE — LETTER
My Asthma Action Plan  Name: Itzel Zacarias   YOB: 1965  Date: 9/17/2018   My doctor: LIZZETTE VALERIO MD   My clinic: [facility]        My Control Medicine: Fluticasone propionate (Flovent) -   mcg 1 puff twice a day   My Rescue Medicine: Albuterol (Proair/Ventolin/Proventil) inhaler 2 puffs every 4 hours as needed for asthma   My Asthma Severity: mild persistent  Avoid your asthma triggers: smoke, upper respiratory infections, dust mites and pollens               GREEN ZONE   Good Control    I feel good    No cough or wheeze    Can work, sleep and play without asthma symptoms       Take your asthma control medicine every day.     1. If exercise triggers your asthma, take your rescue medication    15 minutes before exercise or sports, and    During exercise if you have asthma symptoms  2. Spacer to use with inhaler: If you have a spacer, make sure to use it with your inhaler             YELLOW ZONE Getting Worse  I have ANY of these:    I do not feel good    Cough or wheeze    Chest feels tight    Wake up at night   1. Keep taking your Green Zone medications  2. Start taking your rescue medicine:    every 20 minutes for up to 1 hour. Then every 4 hours for 24-48 hours.  3. If you stay in the Yellow Zone for more than 12-24 hours, contact your doctor.  4. If you do not return to the Green Zone in 12-24 hours or you get worse, start taking your oral steroid medicine if prescribed by your provider.           RED ZONE Medical Alert - Get Help  I have ANY of these:    I feel awful    Medicine is not helping    Breathing getting harder    Trouble walking or talking    Nose opens wide to breathe       1. Take your rescue medicine NOW  2. If your provider has prescribed an oral steroid medicine, start taking it NOW  3. Call your doctor NOW  4. If you are still in the Red Zone after 20 minutes and you have not reached your doctor:    Take your rescue medicine again and    Call  911 or go to the emergency room right away    See your regular doctor within 2 weeks of an Emergency Room or Urgent Care visit for follow-up treatment.          Annual Reminders:  Meet with Asthma Educator,  Flu Shot in the Fall, consider Pneumonia Vaccination for patients with asthma (aged 19 and older).    Pharmacy:    [facility] [address] [place] [address], [place] - [address] AT [address] [place], [place] - [address] [facility]                      Asthma Triggers  How To Control Things That Make Your Asthma Worse    Triggers are things that make your asthma worse.  Look at the list below to help you find your triggers and what you can do about them.  You can help prevent asthma flare-ups by staying away from your triggers.      Trigger                                                          What you can do   Cigarette Smoke  Tobacco smoke can make asthma worse. Do not allow smoking in your home, car or around you.  Be sure no one smokes at a child s day care or school.  If you smoke, ask your health care provider for ways to help you quit.  Ask family members to quit too.  Ask your health care provider for a referral to Quit Plan to help you quit smoking, or call 7-024-430-PLAN.     Colds, Flu, Bronchitis  These are common triggers of asthma. Wash your hands often.  Don t touch your eyes, nose or mouth.  Get a flu shot every year.     Dust Mites  These are tiny bugs that live in cloth or carpet. They are too small to see. Wash sheets and blankets in hot water every week.   Encase pillows and mattress in dust mite proof covers.  Avoid having carpet if you can. If you have carpet, vacuum weekly.   Use a dust mask and HEPA vacuum.   Pollen and Outdoor Mold  Some people are allergic to trees, grass, or weed pollen, or molds. Try to keep your windows closed.  Limit time out doors when pollen count is high.   Ask you health care provider about taking medicine during allergy  season.     Animal Dander  Some people are allergic to skin flakes, urine or saliva from pets with fur or feathers. Keep pets with fur or feathers out of your home.    If you can t keep the pet outdoors, then keep the pet out of your bedroom.  Keep the bedroom door closed.  Keep pets off cloth furniture and away from stuffed toys.     Mice, Rats, and Cockroaches  Some people are allergic to the waste from these pests.   Cover food and garbage.  Clean up spills and food crumbs.  Store grease in the refrigerator.   Keep food out of the bedroom.   Indoor Mold  This can be a trigger if your home has high moisture. Fix leaking faucets, pipes, or other sources of water.   Clean moldy surfaces.  Dehumidify basement if it is damp and smelly.   Smoke, Strong Odors, and Sprays  These can reduce air quality. Stay away from strong odors and sprays, such as perfume, powder, hair spray, paints, smoke incense, paint, cleaning products, candles and new carpet.   Exercise or Sports  Some people with asthma have this trigger. Be active!  Ask your doctor about taking medicine before sports or exercise to prevent symptoms.    Warm up for 5-10 minutes before and after sports or exercise.     Other Triggers of Asthma  Cold air:  Cover your nose and mouth with a scarf.  Sometimes laughing or crying can be a trigger.  Some medicines and food can trigger asthma.

## 2018-09-20 NOTE — MR AVS SNAPSHOT
After Visit Summary   9/20/2018    Itzel Zacarias    MRN: [id number]           Patient Information     Date Of Birth          1965        Visit Information        Provider Department      9/20/2018 2:00 PM Claudia Nagel MD [facility]        Today's Diagnoses     Encounter for routine adult health examination without abnormal findings    -  1    Screen for colon cancer        Need for prophylactic vaccination and inoculation against influenza        Encounter for screening mammogram for breast cancer          Care Instructions      Preventive Health Recommendations  Female Ages 50 - 64    Yearly exam: See your health care provider every year in order to  o Review health changes.   o Discuss preventive care.    o Review your medicines if your doctor has prescribed any.      Get a Pap test every three years (unless you have an abnormal result and your provider advises testing more often).    If you get Pap tests with HPV test, you only need to test every 5 years, unless you have an abnormal result.     You do not need a Pap test if your uterus was removed (hysterectomy) and you have not had cancer.    You should be tested each year for STDs (sexually transmitted diseases) if you're at risk.     Have a mammogram every 1 to 2 years.    Have a colonoscopy at age 50, or have a yearly FIT test (stool test). These exams screen for colon cancer.      Have a cholesterol test every 5 years, or more often if advised.    Have a diabetes test (fasting glucose) every three years. If you are at risk for diabetes, you should have this test more often.     If you are at risk for osteoporosis (brittle bone disease), think about having a bone density scan (DEXA).    Shots: Get a flu shot each year. Get a tetanus shot every 10 years.    Nutrition:     Eat at least 5 servings of fruits and vegetables each day.    Eat whole-grain bread, whole-wheat pasta and brown rice instead of white grains  and rice.    Get adequate Calcium and Vitamin D.     Lifestyle    Exercise at least 150 minutes a week (30 minutes a day, 5 days a week). This will help you control your weight and prevent disease.    Limit alcohol to one drink per day.    No smoking.     Wear sunscreen to prevent skin cancer.     See your dentist every six months for an exam and cleaning.    See your eye doctor every 1 to 2 years.  [facility]    If you have any questions regarding to your visit please contact your care team:       Team Purple:   Clinic Hours Telephone Number   Dr. Claudia Higginbotham   7am-7pm  Monday - Thursday   7am-5pm  Fridays  [phone]  (Appointment scheduling available 24/7)   Urgent [facility] - [facility] [place] - 11am-9pm Monday-Friday Saturday-Sunday- 9am-5pm   [facility] - 5pm-9pm Monday-Friday Saturday-Sunday- 9am-5pm  [phone] - [place]  [phone] [facility]       What options do I have for a visit other than an office visit? We offer electronic visits (e-visits) and telephone visits, when medically appropriate.  Please check with your medical insurance to see if these types of visits are covered, as you will be responsible for any charges that are not paid by your insurance.      You can use [employer] (secure electronic communication) to access to your chart, send your primary care provider a message, or make an appointment. Ask a team member how to get started.     For a price quote for your services, please call our Consumer Price Line at [phone] or our Imaging Cost estimation line at [phone] (for imaging tests).              Follow-ups after your visit        Additional Services     GASTROENTEROLOGY ADULT REF PROCEDURE ONLY Other; [facility] [phone]       Last Lab Result: No results found for: CR  There is no height or weight on file to calculate BMI.     Needed:  No  Language:  English    Patient will be  contacted to schedule procedure.     Please be aware that coverage of these services is subject to the terms and limitations of your health insurance plan.  Call member services at your health plan with any benefit or coverage questions.  Any procedures must be performed at a [facility] facility OR coordinated by your clinic's referral office.    Please bring the following with you to your appointment:    (1) Any X-Rays, CTs or MRIs which have been performed.  Contact the facility where they were done to arrange for  prior to your scheduled appointment.    (2) List of current medications   (3) This referral request   (4) Any documents/labs given to you for this referral                  Future tests that were ordered for you today     Open Future Orders        Priority Expected Expires Ordered    *MA Screening Digital Bilateral Routine  9/20/2019 9/20/2018            Who to contact     If you have questions or need follow up information about today's clinic visit or your schedule please contact [facility] [place] directly at [phone].  Normal or non-critical lab and imaging results will be communicated to you by MyChart, letter or phone within 4 business days after the clinic has received the results. If you do not hear from us within 7 days, please contact the clinic through MyChart or phone. If you have a critical or abnormal lab result, we will notify you by phone as soon as possible.  Submit refill requests through [employer] or call your pharmacy and they will forward the refill request to us. Please allow 3 business days for your refill to be completed.          Additional Information About Your Visit        Care EveryWhere ID     This is your Care EveryWhere ID. This could be used by other organizations to access your [facility] medical records  [id number]        Your Vitals Were     Pulse Temperature Respirations Height Pulse Oximetry BMI (Body Mass Index)    67 98.1  F (36.7  C) (Oral) 20 5'  "7.4\" (1.712 m) 97% 31.51 kg/m2       Blood Pressure from Last 3 Encounters:   09/20/18 144/72   01/30/18 116/80   09/12/17 122/66    Weight from Last 3 Encounters:   09/20/18 203 lb 9.6 oz (92.4 kg)   01/30/18 212 lb (96.2 kg)   09/12/17 207 lb 8 oz (94.1 kg)              We Performed the Following     FLU VACCINE, (RIV4) RECOMBINANT HA  , IM (FluBlok, egg free) [28704]- >18 YRS (FMG recommended  50-64 YRS)     GASTROENTEROLOGY ADULT REF PROCEDURE ONLY Other; [facility] [phone]     Vaccine Administration, Initial [10930]        Primary Care Provider Office Phone # Fax #    Claudia Nagel [phone] [facility] [phone]        Equal Access to Services     [facility]: Hadii aad ku hadasho Sosharron, waaxda luqadaha, qaybta kaalmada adeegyamayela, carine thurston . So [facility] [phone].    ATENCIÓN: Si megla espdurga, tiene a sharpe disposición servicios gratuitos de asistencia lingüística. Llame al [phone].    We comply with applicable federal civil rights laws and [place] laws. We do not discriminate on the basis of race, color, national origin, age, disability, sex, sexual orientation, or gender identity.            Thank you!     Thank you for choosing [facility]  for your care. Our goal is always to provide you with excellent care. Hearing back from our patients is one way we can continue to improve our services. Please take a few minutes to complete the written survey that you may receive in the mail after your visit with us. Thank you!             Your Updated Medication List - Protect others around you: Learn how to safely use, store and throw away your medicines at www.disposemymeds.org.          This list is accurate as of 9/20/18  2:46 PM.  Always use your most recent med list.                   Brand Name Dispense Instructions for use Diagnosis    * albuterol (2.5 MG/3ML) 0.083% neb solution     1 Box    Take 1 " vial (2.5 mg) by nebulization every 6 hours as needed    Mild persistent asthma without complication       * VENTOLIN  (90 Base) MCG/ACT inhaler   Generic drug:  albuterol     18 g    INHALE TWO PUFFS BY MOUTH EVERY 6 HOURS AS NEEDED    Mild persistent asthma without complication       fluticasone 220 MCG/ACT Inhaler    FLOVENT HFA    3 Inhaler    Inhale 2 puffs into the lungs 2 times daily    Mild persistent asthma without complication       fluticasone 50 MCG/ACT spray    FLONASE    16 g    USE TWO SPRAYS IN EACH NOSTRIL EVERY DAY    Seasonal allergic rhinitis, unspecified chronicity, unspecified trigger       order for DME     1 each    nebulizer    Mild persistent asthma without complication       * Notice:  This list has 2 medication(s) that are the same as other medications prescribed for you. Read the directions carefully, and ask your doctor or other care provider to review them with you.
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
## 2018-09-20 NOTE — LETTER
My Depression Action Plan  Name: Itzel Zacarias   Date of Birth 1965  Date: 9/17/2018    My doctor: Claudia Nagel   My clinic: [address]  [place] [address]  [phone]          GREEN    ZONE   Good Control    What it looks like:     Things are going generally well. You have normal up s and down s. You may even feel depressed from time to time, but bad moods usually last less than a day.   What you need to do:  1. Continue to care for yourself (see self care plan)  2. Check your depression survival kit and update it as needed  3. Follow your physician s recommendations including any medication.  4. Do not stop taking medication unless you consult with your physician first.           YELLOW         ZONE Getting Worse    What it looks like:     Depression is starting to interfere with your life.     It may be hard to get out of bed; you may be starting to isolate yourself from others.    Symptoms of depression are starting to last most all day and this has happened for several days.     You may have suicidal thoughts but they are not constant.   What you need to do:     1. Call your care team, your response to treatment will improve if you keep your care team informed of your progress. Yellow periods are signs an adjustment may need to be made.     2. Continue your self-care, even if you have to fake it!    3. Talk to someone in your support network    4. Open up your depression survival kit           RED    ZONE Medical Alert - Get Help    What it looks like:     Depression is seriously interfering with your life.     You may experience these or other symptoms: You can t get out of bed most days, can t work or engage in other necessary activities, you have trouble taking care of basic hygiene, or basic responsibilities, thoughts of suicide or death that will not go away, self-injurious behavior.     What you need to do:  1. Call your care team  and request a same-day appointment. If they are not available (weekends or after hours) call your local crisis line, emergency room or 911.            Depression Self Care Plan / Survival Kit    Self-Care for Depression  Here s the deal. Your body and mind are really not as separate as most people think.  What you do and think affects how you feel and how you feel influences what you do and think. This means if you do things that people who feel good do, it will help you feel better.  Sometimes this is all it takes.  There is also a place for medication and therapy depending on how severe your depression is, so be sure to consult with your medical provider and/ or Behavioral Health Consultant if your symptoms are worsening or not improving.     In order to better manage my stress, I will:    Exercise  Get some form of exercise, every day. This will help reduce pain and release endorphins, the  feel good  chemicals in your brain. This is almost as good as taking antidepressants!  This is not the same as joining a gym and then never going! (they count on that by the way ) It can be as simple as just going for a walk or doing some gardening, anything that will get you moving.      Hygiene   Maintain good hygiene (Get out of bed in the morning, Make your bed, Brush your teeth, Take a shower, and Get dressed like you were going to work, even if you are unemployed).  If your clothes don't fit try to get ones that do.    Diet  I will strive to eat foods that are good for me, drink plenty of water, and avoid excessive sugar, caffeine, alcohol, and other mood-altering substances.  Some foods that are helpful in depression are: complex carbohydrates, B vitamins, flaxseed, fish or fish oil, fresh fruits and vegetables.    Psychotherapy  I agree to participate in Individual Therapy (if recommended).    Medication  If prescribed medications, I agree to take them.  Missing doses can result in serious side effects.  I understand  that drinking alcohol, or other illicit drug use, may cause potential side effects.  I will not stop my medication abruptly without first discussing it with my provider.    Staying Connected With Others  I will stay in touch with my friends, family members, and my primary care provider/team.    Use your imagination  Be creative.  We all have a creative side; it doesn t matter if it s oil painting, sand castles, or mud pies! This will also kick up the endorphins.    Witness Beauty  (AKA stop and smell the roses) Take a look outside, even in mid-winter. Notice colors, textures. Watch the squirrels and birds.     Service to others  Be of service to others.  There is always someone else in need.  By helping others we can  get out of ourselves  and remember the really important things.  This also provides opportunities for practicing all the other parts of the program.    Humor  Laugh and be silly!  Adjust your TV habits for less news and crime-drama and more comedy.    Control your stress  Try breathing deep, massage therapy, biofeedback, and meditation. Find time to relax each day.     My support system    Clinic Contact:  Phone number:    Contact 1:  Phone number:    Contact 2:  Phone number:    Yarsani/:  Phone number:    Therapist:  Phone number:    Local crisis center:    Phone number:    Other community support:  Phone number:

## 2018-09-20 NOTE — NURSING NOTE
Prior to injection verified patient identity using patient's name and date of birth.  Due to injection administration, patient instructed to remain in clinic for 15 minutes  afterwards, and to report any adverse reaction to me immediately.  Adry Tanner MA

## 2018-09-21 ASSESSMENT — PATIENT HEALTH QUESTIONNAIRE - PHQ9: SUM OF ALL RESPONSES TO PHQ QUESTIONS 1-9: 3

## 2018-09-21 ASSESSMENT — ASTHMA QUESTIONNAIRES: ACT_TOTALSCORE: 17

## 2018-10-01 DIAGNOSIS — J45.30 MILD PERSISTENT ASTHMA WITHOUT COMPLICATION: ICD-10-CM

## 2018-10-03 RX ORDER — FLUTICASONE PROPIONATE 220 UG/1
AEROSOL, METERED RESPIRATORY (INHALATION)
Qty: 36 G | Refills: 3 | Status: SHIPPED | OUTPATIENT
Start: 2018-10-03 | End: 2019-07-10 | Stop reason: ALTCHOICE

## 2018-12-10 ENCOUNTER — ANCILLARY PROCEDURE (OUTPATIENT)
Dept: GENERAL RADIOLOGY | Facility: CLINIC | Age: 53
End: 2018-12-10
Attending: ORTHOPAEDIC SURGERY
Payer: COMMERCIAL

## 2018-12-10 ENCOUNTER — ANCILLARY PROCEDURE (OUTPATIENT)
Dept: MRI IMAGING | Facility: CLINIC | Age: 53
End: 2018-12-10
Attending: PHYSICIAN ASSISTANT
Payer: COMMERCIAL

## 2018-12-10 ENCOUNTER — OFFICE VISIT (OUTPATIENT)
Dept: ORTHOPEDICS | Facility: CLINIC | Age: 53
End: 2018-12-10
Payer: COMMERCIAL

## 2018-12-10 VITALS
HEART RATE: 66 BPM | BODY MASS INDEX: 30.61 KG/M2 | RESPIRATION RATE: 13 BRPM | OXYGEN SATURATION: 98 % | DIASTOLIC BLOOD PRESSURE: 80 MMHG | HEIGHT: 67 IN | SYSTOLIC BLOOD PRESSURE: 135 MMHG | WEIGHT: 195 LBS

## 2018-12-10 DIAGNOSIS — M25.561 RIGHT KNEE PAIN, UNSPECIFIED CHRONICITY: ICD-10-CM

## 2018-12-10 DIAGNOSIS — M23.91 DERANGEMENT, KNEE INTERNAL, RIGHT: ICD-10-CM

## 2018-12-10 DIAGNOSIS — M25.561 RIGHT KNEE PAIN, UNSPECIFIED CHRONICITY: Primary | ICD-10-CM

## 2018-12-10 PROCEDURE — 73562 X-RAY EXAM OF KNEE 3: CPT | Mod: RT

## 2018-12-10 PROCEDURE — 73721 MRI JNT OF LWR EXTRE W/O DYE: CPT | Mod: TC

## 2018-12-10 PROCEDURE — 99203 OFFICE O/P NEW LOW 30 MIN: CPT | Performed by: ORTHOPAEDIC SURGERY

## 2018-12-10 ASSESSMENT — MIFFLIN-ST. JEOR: SCORE: 1537.14

## 2018-12-10 NOTE — PATIENT INSTRUCTIONS
- Please call [facility] at [phone] to schedule your MRI.     [facility] is located at:   [address] [place] [address]    -  Once your MRI is scheduled, make an appointment to discuss the results with Dr. Abel Brandt.  You can call [phone] to make this appointment, anytime 2-3 days after your MRI scan is performed. Dr. Brandt prefers patients to come in for a follow-up appointment to discuss next steps after the MRI.

## 2018-12-10 NOTE — LETTER
12/10/2018         RE: Itzel Zacarias  [address]  [address]  [facility] [address]        Dear Colleague,    Thank you for referring your patient, Itzel Zacarias, to the [facility]. Please see a copy of my visit note below.    Itzel Zacarias is a 52 year old female who is seen as self referral for right knee pain.  She has had knee pain for about a month and a half. She has diffuse pain over the medial and lateral aspect of the knee She has aching pain she rates it 8 out of 10. She has used ibuprofen 600 mg at a time every few days. She is also used icy hot cream.    X-ray performed today shows no significant degenerative changes of the right knee.    History reviewed. No pertinent past medical history.    Past Surgical History:   Procedure Laterality Date     DILATION AND CURETTAGE, HYSTEROSCOPY, ABLATE ENDOMETRIUM NOVASURE, COMBINED  3/4/2013    Procedure: COMBINED DILATION AND CURETTAGE, HYSTEROSCOPY, ABLATE ENDOMETRIUM NOVASURE;  Hysteroscopy with Endometrial Ablation Novasure &  Dilation and Curettage Nobvasure;  Surgeon: Juaquin Scherer MD;  Location: [facility] OR     GYN SURGERY  1995    tubal ligation       Family History   Problem Relation Age of Onset     Diabetes Maternal Grandmother      C.A.D. No family hx of      Breast Cancer No family hx of        Social History     Socioeconomic History     Marital status: Legally      Spouse name: Not on file     Number of children: 3     Years of education: Not on file     Highest education level: Not on file   Social Needs     Financial resource strain: Not on file     Food insecurity - worry: Not on file     Food insecurity - inability: Not on file     Transportation needs - medical: Not on file     Transportation needs - non-medical: Not on file   Occupational History     Employer: [employer]    Tobacco Use     Smoking status: Never Smoker     Smokeless tobacco: Never Used   Substance and Sexual Activity     Alcohol  "use: Yes     Drug use: No     Sexual activity: Not Currently   Other Topics Concern     Parent/sibling w/ CABG, MI or angioplasty before 65F 55M? No   Social History Narrative                   Current Outpatient Medications   Medication Sig Dispense Refill     albuterol (2.5 MG/3ML) 0.083% neb solution NEBULIZE CONTENTS OF ONE VIAL EVERY 6 HOURS AS NEEDED 1 Box 1     FLOVENT  MCG/ACT Inhaler INHALE TWO PUFFS BY MOUTH TWICE A DAY 36 g 3     fluticasone (FLONASE) 50 MCG/ACT spray USE TWO SPRAYS IN EACH NOSTRIL EVERY DAY 16 g 11     order for DME nebulizer 1 each 0     VENTOLIN  (90 Base) MCG/ACT inhaler INHALE TWO PUFFS BY MOUTH EVERY 6 HOURS AS NEEDED 18 g 11       Allergies   Allergen Reactions     Seasonal Allergies        REVIEW OF SYSTEMS:  CONSTITUTIONAL:  NEGATIVE for fever, chills, change in weight, not feeling tired  SKIN:  NEGATIVE for worrisome rashes, no skin lumps, no skin ulcers and no non-healing wounds  EYES:  NEGATIVE for vision changes or irritation.  ENT/MOUTH:  NEGATIVE.  No hearing loss, no hoarseness, no difficulty swallowing.  RESP:  NEGATIVE. No cough or shortness of breath.  CV:  NEGATIVE for chest pain, palpitations or peripheral edema  GI:  NEGATIVE for nausea, abdominal pain, heartburn, or change in bowel habits  :  Negative. No dysuria, no hematuria  MUSCULOSKELETAL:  See HPI above  NEURO:  NEGATIVE . No headaches, no dizziness,  no numbness  ENDOCRINE:  NEGATIVE for temperature intolerance, skin/hair changes  HEME/ALLERGY/IMMUNE:  NEGATIVE for bleeding problems  PSYCHIATRIC:  NEGATIVE. no anxiety, no depression.      Exam:  Vitals: /80 (BP Location: Left arm, Patient Position: Chair, Cuff Size: Adult Regular)   Pulse 66   Resp 13   Ht 1.702 m (5' 7\")   Wt 88.5 kg (195 lb)   SpO2 98%   BMI 30.54 kg/m     BMI= Body mass index is 30.54 kg/m .  Constitutional:  healthy, alert and no distress  Neuro: Alert and Oriented x 3, Sensation grossly WNL.  HEENT:  " Atraumatic, EOMI  Neck:  Neck supple with no tenderness.  Psych: Affect normal   Respiratory: Breathing not labored.  Cardiovascular: normal peripheral pulses  Lymph: no adenopathy  Skin: No rashes,worrisome lesions or skin problems  Spine: straight, no straight leg raising pain.  Hips show full range of motion.  There is no tenderness over the sacro-iliac joints, sciatic notch, or greater trochanters.   She has motion of the knee from 0-120 .  She indicates pain along the medial and lateral aspect of the knee, but mostly medial.  She has no ligamentous laxity of medial collateral ligament, lateral collateral ligament, cruciates.  She has no effusions.  She does have diffuse pain with both medial and lateral Elisa.  She does not report posterior popliteal pain.  No warmth or erythema.  Sensation, motor and circulation are intact.    Assessment:  Right knee pain of undetermined cause.  It certainly is not only arthritis.  I discussed options of anti-inflammatories, steroid injection, physical therapy, or MRI scan. She would like to have MRI scan to determine the cause of this knee pain.  We will order this and see her back following MRI right knee.    Again, thank you for allowing me to participate in the care of your patient.        Sincerely,        Abel Brandt MD

## 2018-12-11 NOTE — PROGRESS NOTES
Itzel Zacarias is a 52 year old female who is seen as self referral for right knee pain.  She has had knee pain for about a month and a half. She has diffuse pain over the medial and lateral aspect of the knee She has aching pain she rates it 8 out of 10. She has used ibuprofen 600 mg at a time every few days. She is also used icy hot cream.    X-ray performed today shows no significant degenerative changes of the right knee.    History reviewed. No pertinent past medical history.    Past Surgical History:   Procedure Laterality Date     DILATION AND CURETTAGE, HYSTEROSCOPY, ABLATE ENDOMETRIUM NOVASURE, COMBINED  3/4/2013    Procedure: COMBINED DILATION AND CURETTAGE, HYSTEROSCOPY, ABLATE ENDOMETRIUM NOVASURE;  Hysteroscopy with Endometrial Ablation Novasure &  Dilation and Curettage Nobvasure;  Surgeon: Juaquin Scherer MD;  Location: [facility] OR     GYN SURGERY  1995    tubal ligation       Family History   Problem Relation Age of Onset     Diabetes Maternal Grandmother      C.A.D. No family hx of      Breast Cancer No family hx of        Social History     Socioeconomic History     Marital status: Legally      Spouse name: Not on file     Number of children: 3     Years of education: Not on file     Highest education level: Not on file   Social Needs     Financial resource strain: Not on file     Food insecurity - worry: Not on file     Food insecurity - inability: Not on file     Transportation needs - medical: Not on file     Transportation needs - non-medical: Not on file   Occupational History     Employer: [employer]    Tobacco Use     Smoking status: Never Smoker     Smokeless tobacco: Never Used   Substance and Sexual Activity     Alcohol use: Yes     Drug use: No     Sexual activity: Not Currently   Other Topics Concern     Parent/sibling w/ CABG, MI or angioplasty before 65F 55M? No   Social History Narrative                   Current Outpatient Medications   Medication Sig  "Dispense Refill     albuterol (2.5 MG/3ML) 0.083% neb solution NEBULIZE CONTENTS OF ONE VIAL EVERY 6 HOURS AS NEEDED 1 Box 1     FLOVENT  MCG/ACT Inhaler INHALE TWO PUFFS BY MOUTH TWICE A DAY 36 g 3     fluticasone (FLONASE) 50 MCG/ACT spray USE TWO SPRAYS IN EACH NOSTRIL EVERY DAY 16 g 11     order for DME nebulizer 1 each 0     VENTOLIN  (90 Base) MCG/ACT inhaler INHALE TWO PUFFS BY MOUTH EVERY 6 HOURS AS NEEDED 18 g 11       Allergies   Allergen Reactions     Seasonal Allergies        REVIEW OF SYSTEMS:  CONSTITUTIONAL:  NEGATIVE for fever, chills, change in weight, not feeling tired  SKIN:  NEGATIVE for worrisome rashes, no skin lumps, no skin ulcers and no non-healing wounds  EYES:  NEGATIVE for vision changes or irritation.  ENT/MOUTH:  NEGATIVE.  No hearing loss, no hoarseness, no difficulty swallowing.  RESP:  NEGATIVE. No cough or shortness of breath.  CV:  NEGATIVE for chest pain, palpitations or peripheral edema  GI:  NEGATIVE for nausea, abdominal pain, heartburn, or change in bowel habits  :  Negative. No dysuria, no hematuria  MUSCULOSKELETAL:  See HPI above  NEURO:  NEGATIVE . No headaches, no dizziness,  no numbness  ENDOCRINE:  NEGATIVE for temperature intolerance, skin/hair changes  HEME/ALLERGY/IMMUNE:  NEGATIVE for bleeding problems  PSYCHIATRIC:  NEGATIVE. no anxiety, no depression.      Exam:  Vitals: /80 (BP Location: Left arm, Patient Position: Chair, Cuff Size: Adult Regular)   Pulse 66   Resp 13   Ht 1.702 m (5' 7\")   Wt 88.5 kg (195 lb)   SpO2 98%   BMI 30.54 kg/m    BMI= Body mass index is 30.54 kg/m .  Constitutional:  healthy, alert and no distress  Neuro: Alert and Oriented x 3, Sensation grossly WNL.  HEENT:  Atraumatic, EOMI  Neck:  Neck supple with no tenderness.  Psych: Affect normal   Respiratory: Breathing not labored.  Cardiovascular: normal peripheral pulses  Lymph: no adenopathy  Skin: No rashes,worrisome lesions or skin problems  Spine: straight, " no straight leg raising pain.  Hips show full range of motion.  There is no tenderness over the sacro-iliac joints, sciatic notch, or greater trochanters.   She has motion of the knee from 0-120 .  She indicates pain along the medial and lateral aspect of the knee, but mostly medial.  She has no ligamentous laxity of medial collateral ligament, lateral collateral ligament, cruciates.  She has no effusions.  She does have diffuse pain with both medial and lateral Elisa.  She does not report posterior popliteal pain.  No warmth or erythema.  Sensation, motor and circulation are intact.    Assessment:  Right knee pain of undetermined cause.  It certainly is not only arthritis.  I discussed options of anti-inflammatories, steroid injection, physical therapy, or MRI scan. She would like to have MRI scan to determine the cause of this knee pain.  We will order this and see her back following MRI right knee.

## 2018-12-13 ENCOUNTER — OFFICE VISIT (OUTPATIENT)
Dept: ORTHOPEDICS | Facility: CLINIC | Age: 53
End: 2018-12-13
Payer: COMMERCIAL

## 2018-12-13 VITALS
DIASTOLIC BLOOD PRESSURE: 62 MMHG | RESPIRATION RATE: 13 BRPM | HEART RATE: 77 BPM | OXYGEN SATURATION: 98 % | WEIGHT: 195 LBS | SYSTOLIC BLOOD PRESSURE: 129 MMHG | BODY MASS INDEX: 30.61 KG/M2 | HEIGHT: 67 IN

## 2018-12-13 DIAGNOSIS — M94.261 CHONDROMALACIA OF RIGHT KNEE: ICD-10-CM

## 2018-12-13 PROCEDURE — 99213 OFFICE O/P EST LOW 20 MIN: CPT | Performed by: ORTHOPAEDIC SURGERY

## 2018-12-13 RX ORDER — DICLOFENAC SODIUM 75 MG/1
75 TABLET, DELAYED RELEASE ORAL 2 TIMES DAILY
Qty: 60 TABLET | Refills: 11 | Status: SHIPPED | OUTPATIENT
Start: 2018-12-13 | End: 2019-12-03

## 2018-12-13 ASSESSMENT — MIFFLIN-ST. JEOR: SCORE: 1537.14

## 2018-12-13 NOTE — PATIENT INSTRUCTIONS
Diclofenac 75 mg oral twice daily.   We could consider injection or knee sleeve.  Return to clinic if pain persists into new year.

## 2018-12-13 NOTE — LETTER
12/13/2018         RE: Itzel Zacarias  [address]  [address]  [facility] [address]        Dear Colleague,    Thank you for referring your patient, Itzel Zacarias, to the [facility]. Please see a copy of my visit note below.    Itzel Zacarias returns for her right knee MRI results.  MRI images were independently visualized with the patient.  These showed no medial meniscus tear, mild chondromalacia in the medial compartment and mild chondromalacia in the patellofemoral compartment.  There was edema just superficial to the medial collateral ligament.  No medial collateral ligament tear.    Impression:  Right Knee: chondromalacia and unexplained edema outside medial collateral ligament.    Thus, our plan is NSAIDS.  Diclofenac 75 mg oral twice daily.   Strengthening  Compression.  Observe medial edema.    Total time spent was 20 minutes; with 20 minutes spent face-to-face with patient discussing test results, treatment options, and estimated recovery time.          Again, thank you for allowing me to participate in the care of your patient.        Sincerely,        Abel Brandt MD

## 2018-12-14 NOTE — PROGRESS NOTES
Itzel Zacarias returns for her right knee MRI results.  MRI images were independently visualized with the patient.  These showed no medial meniscus tear, mild chondromalacia in the medial compartment and mild chondromalacia in the patellofemoral compartment.  There was edema just superficial to the medial collateral ligament.  No medial collateral ligament tear.    Impression:  Right Knee: chondromalacia and unexplained edema outside medial collateral ligament.    Thus, our plan is NSAIDS.  Diclofenac 75 mg oral twice daily.   Strengthening  Compression.  Observe medial edema.    Total time spent was 20 minutes; with 20 minutes spent face-to-face with patient discussing test results, treatment options, and estimated recovery time.

## 2019-05-21 ENCOUNTER — OFFICE VISIT (OUTPATIENT)
Dept: FAMILY MEDICINE | Facility: CLINIC | Age: 54
End: 2019-05-21
Payer: COMMERCIAL

## 2019-05-21 VITALS
WEIGHT: 198 LBS | HEIGHT: 67 IN | HEART RATE: 91 BPM | TEMPERATURE: 98.3 F | SYSTOLIC BLOOD PRESSURE: 128 MMHG | BODY MASS INDEX: 31.08 KG/M2 | RESPIRATION RATE: 16 BRPM | DIASTOLIC BLOOD PRESSURE: 68 MMHG | OXYGEN SATURATION: 98 %

## 2019-05-21 DIAGNOSIS — F34.1 PERSISTENT DEPRESSIVE DISORDER: ICD-10-CM

## 2019-05-21 DIAGNOSIS — M79.10 MYALGIA: Primary | ICD-10-CM

## 2019-05-21 DIAGNOSIS — J45.30 MILD PERSISTENT ASTHMA WITHOUT COMPLICATION: ICD-10-CM

## 2019-05-21 DIAGNOSIS — F41.1 GENERALIZED ANXIETY DISORDER: ICD-10-CM

## 2019-05-21 PROBLEM — J30.2 SEASONAL ALLERGIC RHINITIS, UNSPECIFIED CHRONICITY, UNSPECIFIED TRIGGER: Status: ACTIVE | Noted: 2017-08-02

## 2019-05-21 LAB
BASOPHILS # BLD AUTO: 0 10E9/L (ref 0–0.2)
BASOPHILS NFR BLD AUTO: 0.3 %
CK SERPL-CCNC: 66 U/L (ref 30–225)
DIFFERENTIAL METHOD BLD: NORMAL
EOSINOPHIL # BLD AUTO: 0.5 10E9/L (ref 0–0.7)
EOSINOPHIL NFR BLD AUTO: 9.1 %
ERYTHROCYTE [DISTWIDTH] IN BLOOD BY AUTOMATED COUNT: 13.7 % (ref 10–15)
ERYTHROCYTE [SEDIMENTATION RATE] IN BLOOD BY WESTERGREN METHOD: 11 MM/H (ref 0–30)
HCT VFR BLD AUTO: 42.8 % (ref 35–47)
HGB BLD-MCNC: 14 G/DL (ref 11.7–15.7)
LYMPHOCYTES # BLD AUTO: 1.3 10E9/L (ref 0.8–5.3)
LYMPHOCYTES NFR BLD AUTO: 22.3 %
MCH RBC QN AUTO: 32.2 PG (ref 26.5–33)
MCHC RBC AUTO-ENTMCNC: 32.7 G/DL (ref 31.5–36.5)
MCV RBC AUTO: 98 FL (ref 78–100)
MONOCYTES # BLD AUTO: 0.6 10E9/L (ref 0–1.3)
MONOCYTES NFR BLD AUTO: 10.8 %
NEUTROPHILS # BLD AUTO: 3.4 10E9/L (ref 1.6–8.3)
NEUTROPHILS NFR BLD AUTO: 57.5 %
PLATELET # BLD AUTO: 286 10E9/L (ref 150–450)
RBC # BLD AUTO: 4.35 10E12/L (ref 3.8–5.2)
WBC # BLD AUTO: 5.9 10E9/L (ref 4–11)

## 2019-05-21 PROCEDURE — 85652 RBC SED RATE AUTOMATED: CPT | Performed by: FAMILY MEDICINE

## 2019-05-21 PROCEDURE — 99214 OFFICE O/P EST MOD 30 MIN: CPT | Performed by: FAMILY MEDICINE

## 2019-05-21 PROCEDURE — 36415 COLL VENOUS BLD VENIPUNCTURE: CPT | Performed by: FAMILY MEDICINE

## 2019-05-21 PROCEDURE — 82550 ASSAY OF CK (CPK): CPT | Performed by: FAMILY MEDICINE

## 2019-05-21 PROCEDURE — 85025 COMPLETE CBC W/AUTO DIFF WBC: CPT | Performed by: FAMILY MEDICINE

## 2019-05-21 RX ORDER — ESCITALOPRAM OXALATE 10 MG/1
10 TABLET ORAL DAILY
Qty: 30 TABLET | Refills: 1 | Status: SHIPPED | OUTPATIENT
Start: 2019-05-21 | End: 2019-07-02

## 2019-05-21 ASSESSMENT — ENCOUNTER SYMPTOMS
ARTHRALGIAS: 0
HEMATURIA: 0
FEVER: 0
SORE THROAT: 0
DIZZINESS: 0
NUMBNESS: 0
CONSTIPATION: 0
PARESTHESIAS: 0
PALPITATIONS: 0
NAUSEA: 0
EYE PAIN: 0
HEADACHES: 0
WEAKNESS: 0
ABDOMINAL PAIN: 0
DYSPHORIC MOOD: 1
FATIGUE: 1
MYALGIAS: 0
FREQUENCY: 0
DYSURIA: 0
NERVOUS/ANXIOUS: 1
BREAST MASS: 0
CHILLS: 0
SHORTNESS OF BREATH: 1
HEARTBURN: 0
COUGH: 0
HEMATOCHEZIA: 0
DIARRHEA: 0
JOINT SWELLING: 0

## 2019-05-21 ASSESSMENT — MIFFLIN-ST. JEOR: SCORE: 1545.75

## 2019-05-21 ASSESSMENT — PATIENT HEALTH QUESTIONNAIRE - PHQ9
5. POOR APPETITE OR OVEREATING: SEVERAL DAYS
SUM OF ALL RESPONSES TO PHQ QUESTIONS 1-9: 7

## 2019-05-21 ASSESSMENT — ANXIETY QUESTIONNAIRES
3. WORRYING TOO MUCH ABOUT DIFFERENT THINGS: MORE THAN HALF THE DAYS
2. NOT BEING ABLE TO STOP OR CONTROL WORRYING: MORE THAN HALF THE DAYS
5. BEING SO RESTLESS THAT IT IS HARD TO SIT STILL: NOT AT ALL
GAD7 TOTAL SCORE: 5
IF YOU CHECKED OFF ANY PROBLEMS ON THIS QUESTIONNAIRE, HOW DIFFICULT HAVE THESE PROBLEMS MADE IT FOR YOU TO DO YOUR WORK, TAKE CARE OF THINGS AT HOME, OR GET ALONG WITH OTHER PEOPLE: SOMEWHAT DIFFICULT
1. FEELING NERVOUS, ANXIOUS, OR ON EDGE: NOT AT ALL
6. BECOMING EASILY ANNOYED OR IRRITABLE: NOT AT ALL
7. FEELING AFRAID AS IF SOMETHING AWFUL MIGHT HAPPEN: NOT AT ALL

## 2019-05-21 NOTE — PROGRESS NOTES
Subjective     Itzel Zacarias is a 53 year old female who presents to clinic today for the following health issues:    Anxiety   This is a chronic problem. The current episode started more than 1 year ago. The problem occurs constantly. The problem has been gradually worsening. Associated symptoms include fatigue. Pertinent negatives include no abdominal pain, arthralgias, chest pain, chills, congestion, coughing, fever, headaches, joint swelling, myalgias, nausea, numbness, rash, sore throat or weakness. Nothing aggravates the symptoms. She has tried nothing for the symptoms. The treatment provided no relief.   Musculoskeletal Problem   This is a new problem. The current episode started yesterday. The problem occurs rarely. The problem has been unchanged. Associated symptoms include fatigue. Pertinent negatives include no abdominal pain, arthralgias, chest pain, chills, congestion, coughing, fever, headaches, joint swelling, myalgias, nausea, numbness, rash, sore throat or weakness. Nothing aggravates the symptoms. She has tried nothing for the symptoms. The treatment provided no relief.              Reviewed and updated as needed this visit by Provider  Allergies         Review of Systems   Constitutional: Positive for fatigue. Negative for chills and fever.   HENT: Negative for congestion, ear pain, hearing loss and sore throat.    Eyes: Negative for pain and visual disturbance.   Respiratory: Positive for shortness of breath. Negative for cough.    Cardiovascular: Negative for chest pain, palpitations and peripheral edema.   Gastrointestinal: Negative for abdominal pain, constipation, diarrhea, heartburn, hematochezia and nausea.   Breasts:  Negative for tenderness, breast mass and discharge.   Genitourinary: Negative for dysuria, frequency, genital sores, hematuria, pelvic pain, urgency, vaginal bleeding and vaginal discharge.   Musculoskeletal: Negative for arthralgias, joint swelling and myalgias.  "  Skin: Negative for rash.   Neurological: Negative for dizziness, weakness, numbness, headaches and paresthesias.   Psychiatric/Behavioral: Positive for dysphoric mood. Negative for mood changes. The patient is nervous/anxious.          Objective    /68   Pulse 91   Temp 98.3  F (36.8  C) (Oral)   Resp 16   Ht 1.702 m (5' 7\")   Wt 89.8 kg (198 lb)   SpO2 98%   Breastfeeding? No   BMI 31.01 kg/m    Body mass index is 31.01 kg/m .  Physical Exam   GENERAL: alert, no distress and over weight  EYES: Eyes grossly normal to inspection, PERRL and conjunctivae and sclerae normal  HENT: ear canals and TM's normal, nose and mouth without ulcers or lesions  NECK: no adenopathy, no asymmetry, masses, or scars and thyroid normal to palpation  RESP: lungs clear to auscultation - no rales, rhonchi or wheezes  CV: regular rate and rhythm, normal S1 S2, no S3 or S4, no murmur, click or rub, no peripheral edema and peripheral pulses strong  MS: no gross musculoskeletal defects noted, no edema  NEURO: Normal strength and tone, mentation intact and speech normal  PSYCH: mentation appears normal, affect flat and anxious    Diagnostic Test Results:  Labs reviewed in Epic  Results for orders placed or performed in visit on 05/21/19   CBC with platelets differential   Result Value Ref Range    WBC 5.9 4.0 - 11.0 10e9/L    RBC Count 4.35 3.8 - 5.2 10e12/L    Hemoglobin 14.0 11.7 - 15.7 g/dL    Hematocrit 42.8 35.0 - 47.0 %    MCV 98 78 - 100 fl    MCH 32.2 26.5 - 33.0 pg    MCHC 32.7 31.5 - 36.5 g/dL    RDW 13.7 10.0 - 15.0 %    Platelet Count 286 150 - 450 10e9/L    % Neutrophils 57.5 %    % Lymphocytes 22.3 %    % Monocytes 10.8 %    % Eosinophils 9.1 %    % Basophils 0.3 %    Absolute Neutrophil 3.4 1.6 - 8.3 10e9/L    Absolute Lymphocytes 1.3 0.8 - 5.3 10e9/L    Absolute Monocytes 0.6 0.0 - 1.3 10e9/L    Absolute Eosinophils 0.5 0.0 - 0.7 10e9/L    Absolute Basophils 0.0 0.0 - 0.2 10e9/L    Diff Method Automated Method  " "  Erythrocyte sedimentation rate auto   Result Value Ref Range    Sed Rate 11 0 - 30 mm/h           Assessment & Plan     (M79.10) Myalgia  (primary encounter diagnosis)  Plan: CBC with platelets differential, Erythrocyte         sedimentation rate auto, CK total        Symptomatic care.  Return to clinic for persistence, recurrence or new symptoms.     (J45.30) Mild persistent asthma without complication  Comment: normal examination; consider other etiology of symptoms   Plan: discussed addition of controller inhaler, which she will consider - follow-up to establish care      (F34.1) Persistent depressive disorder  (F41.1) Generalized anxiety disorder  Comment: prior medication trials and therapy with our behavioralist   Plan: escitalopram (LEXAPRO) 10 MG tablet        Discussed risks and benefits of this medication. Follow up in one month or sooner for worsening of symptoms or side effects.        BMI:   Estimated body mass index is 31.01 kg/m  as calculated from the following:    Height as of this encounter: 1.702 m (5' 7\").    Weight as of this encounter: 89.8 kg (198 lb).               Return in about 1 month (around 6/21/2019) for depression, asthma.    Anai Velazquez MD  [facility]    "

## 2019-05-21 NOTE — LETTER
[facility]  [address] [place] [address]    May 22, 2019    Itzel Zacarias  [address]   [place]. \[facility] Outlook.\"" [id number]          Dear Itzel,    Your muscle enzyme, blood counts, and inflammation tests are normal. In some cases, muscle aches can be due to a viral infection. If your symptoms are not improving, follow-up in clinic    Enclosed is a copy of your results.     Results for orders placed or performed in visit on 05/21/19   CBC with platelets differential   Result Value Ref Range    WBC 5.9 4.0 - 11.0 10e9/L    RBC Count 4.35 3.8 - 5.2 10e12/L    Hemoglobin 14.0 11.7 - 15.7 g/dL    Hematocrit 42.8 35.0 - 47.0 %    MCV 98 78 - 100 fl    MCH 32.2 26.5 - 33.0 pg    MCHC 32.7 31.5 - 36.5 g/dL    RDW 13.7 10.0 - 15.0 %    Platelet Count 286 150 - 450 10e9/L    % Neutrophils 57.5 %    % Lymphocytes 22.3 %    % Monocytes 10.8 %    % Eosinophils 9.1 %    % Basophils 0.3 %    Absolute Neutrophil 3.4 1.6 - 8.3 10e9/L    Absolute Lymphocytes 1.3 0.8 - 5.3 10e9/L    Absolute Monocytes 0.6 0.0 - 1.3 10e9/L    Absolute Eosinophils 0.5 0.0 - 0.7 10e9/L    Absolute Basophils 0.0 0.0 - 0.2 10e9/L    Diff Method Automated Method    Erythrocyte sedimentation rate auto   Result Value Ref Range    Sed Rate 11 0 - 30 mm/h   CK total   Result Value Ref Range    CK Total 66 30 - 225 U/L       If you have any questions or concerns, please call myself or my nurse at [phone].      Sincerely,        Anai Velazquez MD/ha

## 2019-05-22 ASSESSMENT — ANXIETY QUESTIONNAIRES: GAD7 TOTAL SCORE: 5

## 2019-05-22 ASSESSMENT — ASTHMA QUESTIONNAIRES: ACT_TOTALSCORE: 18

## 2019-05-22 NOTE — RESULT ENCOUNTER NOTE
Mail letter:    Your muscle enzyme, blood counts, and inflammation tests are normal. In some cases, muscle aches can be due to a viral infection. If your symptoms are not improving, follow-up in clinic.     Anai Velazquez MD

## 2019-07-02 ENCOUNTER — OFFICE VISIT (OUTPATIENT)
Dept: FAMILY MEDICINE | Facility: CLINIC | Age: 54
End: 2019-07-02
Payer: COMMERCIAL

## 2019-07-02 VITALS
TEMPERATURE: 98.3 F | BODY MASS INDEX: 31.71 KG/M2 | RESPIRATION RATE: 12 BRPM | HEART RATE: 73 BPM | HEIGHT: 67 IN | SYSTOLIC BLOOD PRESSURE: 128 MMHG | WEIGHT: 202 LBS | OXYGEN SATURATION: 97 % | DIASTOLIC BLOOD PRESSURE: 84 MMHG

## 2019-07-02 DIAGNOSIS — R23.3 EASY BRUISABILITY: ICD-10-CM

## 2019-07-02 DIAGNOSIS — F33.0 MAJOR DEPRESSIVE DISORDER, RECURRENT EPISODE, MILD (H): ICD-10-CM

## 2019-07-02 DIAGNOSIS — R20.8 DYSESTHESIA: ICD-10-CM

## 2019-07-02 DIAGNOSIS — F41.1 GENERALIZED ANXIETY DISORDER: Primary | ICD-10-CM

## 2019-07-02 DIAGNOSIS — J45.30 MILD PERSISTENT ASTHMA WITHOUT COMPLICATION: ICD-10-CM

## 2019-07-02 PROBLEM — F33.1 MAJOR DEPRESSIVE DISORDER, RECURRENT EPISODE, MODERATE (H): Status: ACTIVE | Noted: 2018-04-05

## 2019-07-02 LAB
FEF 25/75: NORMAL
FEV-1: NORMAL
FEV1/FVC: NORMAL
FVC: NORMAL
GLUCOSE SERPL-MCNC: 117 MG/DL (ref 70–99)
VIT B12 SERPL-MCNC: 461 PG/ML (ref 193–986)

## 2019-07-02 PROCEDURE — 36415 COLL VENOUS BLD VENIPUNCTURE: CPT | Performed by: FAMILY MEDICINE

## 2019-07-02 PROCEDURE — 82607 VITAMIN B-12: CPT | Performed by: FAMILY MEDICINE

## 2019-07-02 PROCEDURE — 82947 ASSAY GLUCOSE BLOOD QUANT: CPT | Performed by: FAMILY MEDICINE

## 2019-07-02 PROCEDURE — 94010 BREATHING CAPACITY TEST: CPT | Performed by: FAMILY MEDICINE

## 2019-07-02 PROCEDURE — 99214 OFFICE O/P EST MOD 30 MIN: CPT | Mod: 25 | Performed by: FAMILY MEDICINE

## 2019-07-02 RX ORDER — ESCITALOPRAM OXALATE 10 MG/1
10 TABLET ORAL DAILY
Qty: 90 TABLET | Refills: 1 | Status: SHIPPED | OUTPATIENT
Start: 2019-07-02 | End: 2019-10-02

## 2019-07-02 RX ORDER — FLUTICASONE PROPIONATE 220 UG/1
AEROSOL, METERED RESPIRATORY (INHALATION)
Qty: 36 G | Refills: 3 | Status: CANCELLED | OUTPATIENT
Start: 2019-07-02

## 2019-07-02 ASSESSMENT — ANXIETY QUESTIONNAIRES
3. WORRYING TOO MUCH ABOUT DIFFERENT THINGS: SEVERAL DAYS
1. FEELING NERVOUS, ANXIOUS, OR ON EDGE: NOT AT ALL
7. FEELING AFRAID AS IF SOMETHING AWFUL MIGHT HAPPEN: NOT AT ALL
GAD7 TOTAL SCORE: 3
6. BECOMING EASILY ANNOYED OR IRRITABLE: NOT AT ALL
5. BEING SO RESTLESS THAT IT IS HARD TO SIT STILL: NOT AT ALL
2. NOT BEING ABLE TO STOP OR CONTROL WORRYING: SEVERAL DAYS
IF YOU CHECKED OFF ANY PROBLEMS ON THIS QUESTIONNAIRE, HOW DIFFICULT HAVE THESE PROBLEMS MADE IT FOR YOU TO DO YOUR WORK, TAKE CARE OF THINGS AT HOME, OR GET ALONG WITH OTHER PEOPLE: SOMEWHAT DIFFICULT

## 2019-07-02 ASSESSMENT — MIFFLIN-ST. JEOR: SCORE: 1563.9

## 2019-07-02 ASSESSMENT — PATIENT HEALTH QUESTIONNAIRE - PHQ9: 5. POOR APPETITE OR OVEREATING: SEVERAL DAYS

## 2019-07-02 NOTE — PROGRESS NOTES
Subjective     Itzel Zacarias is a 53 year old female who presents to clinic today for the following health issues:    History of Present Illness        Mental Health Follow-up:  Patient presents to follow-up on Depression & Anxiety.Patient's depression since last visit has been:  Better  The patient is not having other symptoms associated with depression.  Patient's anxiety since last visit has been:  Better  The patient is not having other symptoms associated with anxiety.  Any significant life events: No  Patient is not feeling anxious or having panic attacks.  Patient has no concerns about alcohol or drug use.     Social History  Tobacco Use    Smoking status: Never Smoker    Smokeless tobacco: Never Used  Alcohol use: Yes  Drug use: No      Today's PHQ-9         PHQ-9 Total Score:         PHQ-9 Q9 Thoughts of better off dead/self-harm past 2 weeks :       Thoughts of suicide or self harm:      Self-harm Plan:        Self-harm Action:          Safety concerns for self or others:           She eats 2-3 servings of fruits and vegetables daily.She consumes 2 sweetened beverage(s) daily.  She is taking medications regularly.     She has asthma, but finds that her neb feels more effective than her inhalers. She reports medication compliance.     She is also still worried about easy bruisability and intermittent left sided extremity dysesthesias and numbness. She denies radicular symptoms, weakness, or known aggravating factors.               Reviewed and updated as needed this visit by Provider  Tobacco  Problems  Med Hx  Surg Hx  Soc Hx        Review of Systems   ROS COMP: CONSTITUTIONAL: NEGATIVE for fever, chills, change in weight  ENT/MOUTH: nasal congestion and rhinorrhea-clear  RESP:Hx asthma and SOB/dyspnea  MUSCULOSKELETAL: NEGATIVE for significant arthralgias or myalgia  NEURO: dysesthesias  HEME/ALLERGY/IMMUNE: POSITIVE for bruising   PSYCHIATRIC: HX anxiety and HX depression      Objective    BP  "128/84   Pulse 73   Temp 98.3  F (36.8  C) (Oral)   Resp 12   Ht 1.702 m (5' 7\")   Wt 91.6 kg (202 lb)   SpO2 97%   Breastfeeding? No   BMI 31.64 kg/m    Body mass index is 31.64 kg/m .  Physical Exam   GENERAL: healthy, alert and no distress  EYES: Eyes grossly normal to inspection, PERRL and conjunctivae and sclerae normal  HENT: ear canals and TM's normal, nose and mouth without ulcers or lesions  NECK: no adenopathy, no asymmetry, masses, or scars and thyroid normal to palpation  RESP: lungs clear to auscultation - no rales, rhonchi or wheezes  CV: regular rate and rhythm, normal S1 S2, no S3 or S4, no murmur, click or rub, no peripheral edema   MS: no gross musculoskeletal defects noted, no edema  SKIN: no suspicious lesions or rashes  PSYCH: affect flat and anxious    Diagnostic Test Results:  Labs reviewed in Epic  Results for orders placed or performed in visit on 07/02/19   Spirometry, Breathing Capacity   Result Value Ref Range    FEV-1      FVC      FEV1/FVC      FEF 25/75             Assessment & Plan     (F41.1) Generalized anxiety disorder  (primary encounter diagnosis)  (F33.0) Major depressive d/o, mild recurrent  Comment: improved without side effects   Plan: escitalopram (LEXAPRO) 10 MG tablet   Follow-up 3 months    (J45.30) Mild persistent asthma without complication  Comment: on unknown antihistamine over-the-counter; uncontrolled, suspect poor inhaler technique; patient does not like powder form   Plan: Spirometry, Breathing Capacity        [facility] pharmacy referral     (R23.8) Easy bruisability  Comment: reassuring labs and examination   Plan: avoid any contributing over-the-counter medications. Call or return to clinic as needed if these symptoms worsen or fail to improve as anticipated.     (R20.8) Dysesthesia  Plan: Glucose, Vitamin B12        Offered EMG; Call or return to clinic as needed if these symptoms worsen or fail to improve        BMI:   Estimated body mass index is 31.64 " "kg/m  as calculated from the following:    Height as of this encounter: 1.702 m (5' 7\").    Weight as of this encounter: 91.6 kg (202 lb).   Weight management plan: Discussed healthy diet and exercise guidelines            Return in about 3 months (around 10/2/2019) for physical, asthma.    Anai Velazquez MD  [facility]    "

## 2019-07-02 NOTE — LETTER
July 2, 2019      Itzel Zacarias  [address] [place] [address]   [facility] [address]      Dear Itzel,     Your clinic record indicates that you are due for an asthma update. We have a survey tool called an ACT (or Asthma Control Test) we use to measure the level of control of your asthma. Please complete the enclosed questionnaire and mail it back to us in the self-addressed stamped envelope.     If you have questions about this letter please contact your provider.     Sincerely,    Your [facility]

## 2019-07-02 NOTE — RESULT ENCOUNTER NOTE
Please call patient:    Your asthma is uncontrolled. I recommend discussing your inhalers with our MTM pharmacist Marii. Call our appointment line at [phone] or go to [email or web address].     Anai Velazquez MD

## 2019-07-02 NOTE — LETTER
[facility]  [address] [place] [address]    July 3, 2019    Itzel Zacarias  [address]   [facility] [address]          Dear Itzel,    Your results are normal. No other cause for numbness or tingling was found. If your symptoms persist, let's consider a nerve test called an EMG. Call or return to clinic as needed if these symptoms worsen or fail to improve as anticipated.     Enclosed is a copy of your results.     Results for orders placed or performed in visit on 07/02/19   Spirometry, Breathing Capacity   Result Value Ref Range    FEV-1      FVC      FEV1/FVC      FEF 25/75     Glucose   Result Value Ref Range    Glucose 117 (H) 70 - 99 mg/dL   Vitamin B12   Result Value Ref Range    Vitamin B12 461 193 - 986 pg/mL       If you have any questions or concerns, please call myself or my nurse at [phone].      Sincerely,        Anai Velazquez MD/ha

## 2019-07-03 ASSESSMENT — ANXIETY QUESTIONNAIRES: GAD7 TOTAL SCORE: 3

## 2019-07-03 ASSESSMENT — ASTHMA QUESTIONNAIRES: ACT_TOTALSCORE: 18

## 2019-07-03 NOTE — RESULT ENCOUNTER NOTE
Mail letter:    Your results are normal. No other cause for numbness or tingling was found. If your symptoms persist, let's consider a nerve test called an EMG. Call or return to clinic as needed if these symptoms worsen or fail to improve as anticipated.     Anai Velazquez MD

## 2019-07-10 ENCOUNTER — OFFICE VISIT (OUTPATIENT)
Dept: PHARMACY | Facility: CLINIC | Age: 54
End: 2019-07-10
Payer: COMMERCIAL

## 2019-07-10 VITALS — SYSTOLIC BLOOD PRESSURE: 115 MMHG | DIASTOLIC BLOOD PRESSURE: 82 MMHG

## 2019-07-10 DIAGNOSIS — J45.30 MILD PERSISTENT ASTHMA WITHOUT COMPLICATION: Primary | ICD-10-CM

## 2019-07-10 PROCEDURE — 99607 MTMS BY PHARM ADDL 15 MIN: CPT | Performed by: PHARMACIST

## 2019-07-10 PROCEDURE — 99605 MTMS BY PHARM NP 15 MIN: CPT | Performed by: PHARMACIST

## 2019-07-10 RX ORDER — BUDESONIDE AND FORMOTEROL FUMARATE DIHYDRATE 160; 4.5 UG/1; UG/1
2 AEROSOL RESPIRATORY (INHALATION) 2 TIMES DAILY
Qty: 3 INHALER | Refills: 1 | Status: SHIPPED | OUTPATIENT
Start: 2019-07-10 | End: 2019-10-09

## 2019-07-10 NOTE — PATIENT INSTRUCTIONS
Recommendations from today's MTM visit:                                                    MTM (medication therapy management) is a service provided by a clinical pharmacist designed to help you get the most of out of your medicines.   Today we reviewed what your medicines are for, how to know if they are working, that your medicines are safe and how to make your medicine regimen as easy as possible.     1. Replace Flovent with Symbicort. Use Symbicort two puffs twice daily.    Next MTM visit: I will call on Monday 7/15 at noon.    To schedule another MTM appointment, please call the clinic directly or you may call the MTM scheduling line at [phone] or toll-free at [phone].     My Clinical Pharmacist's contact information:                                                      It was a pleasure talking with you today!  Please feel free to contact me with any questions or concerns you have.      Maria Elena Tate, PharmD  Medication Therapy Management Pharmacist  [phone]    You may receive a survey about the MTM services you received by email and/or US Mail.  I would appreciate your feedback to help me serve you better in the future. Your comments will be anonymous.

## 2019-07-10 NOTE — PROGRESS NOTES
SUBJECTIVE/OBJECTIVE:                           Itzel Zacarias is a 53 year old female coming in for an initial visit for Medication Therapy Management.  She was referred to me from Anai Velazquez for Asthma.    Chief Complaint: Asthma.    Allergies/ADRs: Reviewed in Epic  Tobacco: No tobacco use   Alcohol: 1-3 beverages / week  Caffeine: 1-2 cups/week of coffee, kj ice tea  Activity: New job, 8 hours on feet, [employer]  PMH: Reviewed in Epic    Medication Adherence/Access:  Patient uses pill box(es).  Patient takes medications 1 time(s) per day.   Per patient, misses medication 2 times per week.   Medication barriers: none.     Asthma: Current asthma medications: Flovent 220 HFA, two puffs BID, Ventolin PRN (~once/day) hard time doing stairs.  Pt rinses their mouth after using steroid inhaler. Asthma triggers include: exercise or sports. 3 flights of stairs at home. Always has phlegm in throat.   Pt reports the following symptoms: increased SOB upon exertion .  AAP on file: YES  ACT Total Scores 9/20/2018 5/21/2019 7/2/2019   ACT TOTAL SCORE - - -   ASTHMA ER VISITS - - -   ASTHMA HOSPITALIZATIONS - - -   ACT TOTAL SCORE (Goal Greater than or Equal to 20) 17 18 18   In the past 12 months, how many times did you visit the emergency room for your asthma without being admitted to the hospital? 0 0 0   In the past 12 months, how many times were you hospitalized overnight because of your asthma? 0 0 0     Today's Vitals: /82       ASSESSMENT:                             Current medications were reviewed today.     Medication Adherence: good, no issues identified    Asthma: Needs Improvement. Not at goal; ACT < 20. Pt would benefit from addition of LABA.   Pt would benefit from Symbicort 160/4.5 two puffs twice daily to replace Flovent. Reviewed inhaler technique.    PLAN:                            1. Replace Flovent with Symbicort. Use Symbicort two puffs twice daily.    I spent 30 minutes with this patient  today. All changes were made via collaborative practice agreement with Anai Velazquez. A copy of the visit note was provided to the patient's primary care provider.    Will follow up in 1 week.    The patient was given a summary of these recommendations as an after visit summary.     Maria Elena Tate, PharmD  Medication Therapy Management Pharmacist  [phone]

## 2019-07-10 NOTE — Clinical Note
ETELVINA MAYORGA note.Thanks!Maria Elena Tate, PharmDMedication Therapy Management [phone]

## 2019-07-15 ENCOUNTER — TELEPHONE (OUTPATIENT)
Dept: PHARMACY | Facility: CLINIC | Age: 54
End: 2019-07-15

## 2019-07-15 NOTE — TELEPHONE ENCOUNTER
Called patient for Medication Therapy Management follow up, patient unavailable.  Left message.  Will try again.    Maria Elena Tate, PharmD  Medication Therapy Management Pharmacist  [phone]

## 2019-07-16 ENCOUNTER — TELEPHONE (OUTPATIENT)
Dept: FAMILY MEDICINE | Facility: CLINIC | Age: 54
End: 2019-07-16

## 2019-07-16 NOTE — TELEPHONE ENCOUNTER
Patient seen on 07/02/19. Failed ACT with a Score of 18. Please initiate failed ACT work flow. Tiny Vallecillo MA

## 2019-07-17 NOTE — TELEPHONE ENCOUNTER
Patient returned phone call.  She picked up Symbicort (no copay) and has started. Scheduled follow-up visit for 8/21/19.    Maria Elena Tate, PharmD  Medication Therapy Management Pharmacist  [phone]

## 2019-07-17 NOTE — TELEPHONE ENCOUNTER
2nd attempt, LVM.    Maria Elena Tate, PharmD  Medication Therapy Management Pharmacist  [phone]

## 2019-08-13 NOTE — TELEPHONE ENCOUNTER
Patient have an upcoming appointment on 08/21/2019. Noted to give ACT at appointment with MTM.   Adry Tanner CMA

## 2019-08-21 ENCOUNTER — OFFICE VISIT (OUTPATIENT)
Dept: PHARMACY | Facility: CLINIC | Age: 54
End: 2019-08-21
Payer: COMMERCIAL

## 2019-08-21 DIAGNOSIS — F33.0 MAJOR DEPRESSIVE DISORDER, RECURRENT EPISODE, MILD (H): ICD-10-CM

## 2019-08-21 DIAGNOSIS — J45.30 MILD PERSISTENT ASTHMA WITHOUT COMPLICATION: ICD-10-CM

## 2019-08-21 DIAGNOSIS — J30.89 SEASONAL ALLERGIC RHINITIS DUE TO OTHER ALLERGIC TRIGGER: Primary | ICD-10-CM

## 2019-08-21 DIAGNOSIS — M94.261 CHONDROMALACIA OF RIGHT KNEE: ICD-10-CM

## 2019-08-21 PROCEDURE — 99607 MTMS BY PHARM ADDL 15 MIN: CPT | Performed by: PHARMACIST

## 2019-08-21 PROCEDURE — 99606 MTMS BY PHARM EST 15 MIN: CPT | Performed by: PHARMACIST

## 2019-08-21 RX ORDER — ACETAMINOPHEN 500 MG
1000 TABLET ORAL DAILY PRN
COMMUNITY

## 2019-08-21 RX ORDER — CETIRIZINE HYDROCHLORIDE 10 MG/1
10 TABLET ORAL DAILY
Qty: 90 TABLET | Refills: 3 | Status: SHIPPED | OUTPATIENT
Start: 2019-08-21 | End: 2021-06-30

## 2019-08-21 NOTE — Clinical Note
ETELVINA MAYORGA note, ACT > 20 now.Thanks!Maria Elena Tate, PharmDMedication Therapy Management [phone]

## 2019-08-21 NOTE — PROGRESS NOTES
SUBJECTIVE/OBJECTIVE:                Itzel Zacarias is a 53 year old female coming in for a follow-up visit for Medication Therapy Management.  She was referred to me from Anai Velazquez for Asthma.     Chief Complaint: Follow up from our visit on 7/10/19.    Personal Healthcare Goals: Stands on feet at work.    Allergies/ADRs: Reviewed in Epic  Tobacco: No tobacco use   Alcohol: 1-3 beverages / week  Caffeine: 1-2 cups/week of coffee, kj ice tea  Activity: New job, 8 hours on feet, [employer]  PMH: Reviewed in Epic    Medication Adherence/Access:  Patient uses pill box(es).  Patient takes medications 1 time(s) per day.   Per patient, misses medication 2 times per week.   Medication barriers: none.     Asthma: Current asthma medications: Symbicort 160-4.5 two puffs BID (repalced Flovent), Ventolin PRN (~1-2x/week) still having hard time doing stairs, but things are slightly better. Pt rinses their mouth after using steroid inhaler. Asthma triggers include: exercise or sports. 3 flights of stairs at home. Always has phlegm in throat.   Pt reports the following symptoms: increased SOB upon exertion .  AAP on file: YES  ACT Total Scores 5/21/2019 7/2/2019 8/21/2019   ACT TOTAL SCORE - - -   ASTHMA ER VISITS - - -   ASTHMA HOSPITALIZATIONS - - -   ACT TOTAL SCORE (Goal Greater than or Equal to 20) 18 18 21   In the past 12 months, how many times did you visit the emergency room for your asthma without being admitted to the hospital? 0 0 0   In the past 12 months, how many times were you hospitalized overnight because of your asthma? 0 0 0   Spirometry: Showing mild obstruction (7/2/19)    Allergic rhinitis: Current medications include fluticasone nasal spray 2 spray(s) a few times a week and diphenhydramine daily in the morning d/t dust a work, denies side effects (always tired). Doesn't last full day. Primary symptoms are sneezing and phlegm in throat. Pt feels that current therapy is somewhat effective.      Arthritis (right knee):  Current medications include: diclofenac 75 mg once daily, which is helpful, doesn't usually use the second dose. Takes with food. Will take additional acetaminophen 1000 mg if needed once a day, not every day. Denies side effects.     Depression/Anxiety:  Current medications include: Escitalopram 10mg once daily. Pt reports that depression symptoms are improved, her sister noticed improvement within a couple of weeks of starting escitalopram.  PHQ-9 SCORE 6/20/2018 9/20/2018 5/21/2019   PHQ-9 Total Score - - -   PHQ-9 Total Score 4 3 7     DORYS-7 SCORE 6/20/2018 5/21/2019 7/2/2019   Total Score - - -   Total Score 4 5 3     Today's Vitals: There were no vitals taken for this visit.      ASSESSMENT:              Current medications were reviewed today as discussed above.      Medication Adherence: good, no issues identified     Asthma: Improved. Up to date and at goal of ACT > or equal to 20.  Unsure if patient has Asthma/COPD overlap, as last spirometry showed mild obstruction.  Either way, combo steroid/LABA (Symbicort) is appropriate as step up therapy.     Allergic rhinitis: Needs improvement. Pt may benefit from 24-hour allergy medication vs diphenhydramine. She may also benefit from utilizing Flonase daily to maximize benefit.  Better control of allergies may help asthma as well.    Arthritis (right knee):  Stable. Education provided.    Depression/Anxiety:  Stable.     PLAN:                  1. Continue Symbicort two puffs twice daily.      2. Take Flonase every day, two sprays in each nostril.    3. Start taking Zyrtec (cetirizine) 10 mg daily (ok to finish Benadryl up).    4. Follow-up with Dr. Velazquez in October for physical/asthma.    I spent 20 minutes with this patient today. All changes were made via collaborative practice agreement with Anai Velazquez. A copy of the visit note was provided to the patient's primary care provider.     Will follow up in 6 months with  letter.    The patient was given a summary of these recommendations as an after visit summary.    Maria Elena Tate, PharmD  Medication Therapy Management Pharmacist  [phone]

## 2019-08-21 NOTE — PATIENT INSTRUCTIONS
Recommendations from today's MTM visit:                                                      1. Continue Symbicort two puffs twice daily.      2. Take Flonase every day, two sprays in each nostril.    3. Start taking Zyrtec (cetirizine) 10 mg daily (ok to finish Benadryl up).    It was great to speak with you today.  I value your experience and would be very thankful for your time with providing feedback on our clinic survey. You may receive a survey via email or text message in the next few days.     Next MTM visit: 6 months    To schedule another MTM appointment, please call the clinic directly or you may call the MTM scheduling line at [phone] or toll-free at [phone].     My Clinical Pharmacist's contact information:                                                      It was a pleasure talking with you today!  Please feel free to contact me with any questions or concerns you have.      Maria Elena aTte, PharmD  Medication Therapy Management Pharmacist  [phone]

## 2019-08-22 ASSESSMENT — ASTHMA QUESTIONNAIRES: ACT_TOTALSCORE: 21

## 2019-09-24 DIAGNOSIS — J30.2 SEASONAL ALLERGIC RHINITIS: ICD-10-CM

## 2019-09-26 RX ORDER — FLUTICASONE PROPIONATE 50 MCG
SPRAY, SUSPENSION (ML) NASAL
Qty: 16 G | Refills: 1 | Status: SHIPPED | OUTPATIENT
Start: 2019-09-26 | End: 2020-04-01

## 2019-10-09 ENCOUNTER — OFFICE VISIT (OUTPATIENT)
Dept: FAMILY MEDICINE | Facility: CLINIC | Age: 54
End: 2019-10-09
Payer: COMMERCIAL

## 2019-10-09 VITALS
TEMPERATURE: 97.5 F | HEART RATE: 89 BPM | BODY MASS INDEX: 31.55 KG/M2 | RESPIRATION RATE: 16 BRPM | DIASTOLIC BLOOD PRESSURE: 78 MMHG | WEIGHT: 201 LBS | SYSTOLIC BLOOD PRESSURE: 146 MMHG | OXYGEN SATURATION: 99 % | HEIGHT: 67 IN

## 2019-10-09 DIAGNOSIS — B07.0 PLANTAR WARTS: ICD-10-CM

## 2019-10-09 DIAGNOSIS — M54.50 ACUTE BILATERAL LOW BACK PAIN WITHOUT SCIATICA: ICD-10-CM

## 2019-10-09 DIAGNOSIS — R03.0 ELEVATED BLOOD PRESSURE READING WITHOUT DIAGNOSIS OF HYPERTENSION: ICD-10-CM

## 2019-10-09 DIAGNOSIS — F41.1 GENERALIZED ANXIETY DISORDER: ICD-10-CM

## 2019-10-09 DIAGNOSIS — Z00.00 ROUTINE GENERAL MEDICAL EXAMINATION AT A HEALTH CARE FACILITY: Primary | ICD-10-CM

## 2019-10-09 DIAGNOSIS — J45.30 MILD PERSISTENT ASTHMA WITHOUT COMPLICATION: ICD-10-CM

## 2019-10-09 DIAGNOSIS — F33.0 MAJOR DEPRESSIVE DISORDER, RECURRENT EPISODE, MILD (H): ICD-10-CM

## 2019-10-09 DIAGNOSIS — Z12.31 VISIT FOR SCREENING MAMMOGRAM: ICD-10-CM

## 2019-10-09 PROCEDURE — 99396 PREV VISIT EST AGE 40-64: CPT | Performed by: FAMILY MEDICINE

## 2019-10-09 PROCEDURE — 99214 OFFICE O/P EST MOD 30 MIN: CPT | Mod: 25 | Performed by: FAMILY MEDICINE

## 2019-10-09 RX ORDER — ALBUTEROL SULFATE 0.83 MG/ML
SOLUTION RESPIRATORY (INHALATION)
Qty: 1 BOX | Refills: 3 | Status: SHIPPED | OUTPATIENT
Start: 2019-10-09 | End: 2021-06-30

## 2019-10-09 RX ORDER — ESCITALOPRAM OXALATE 10 MG/1
10 TABLET ORAL DAILY
Qty: 90 TABLET | Refills: 3 | Status: SHIPPED | OUTPATIENT
Start: 2019-10-09 | End: 2020-10-15

## 2019-10-09 RX ORDER — BUDESONIDE AND FORMOTEROL FUMARATE DIHYDRATE 160; 4.5 UG/1; UG/1
2 AEROSOL RESPIRATORY (INHALATION) 2 TIMES DAILY
Qty: 3 INHALER | Refills: 3 | Status: SHIPPED | OUTPATIENT
Start: 2019-10-09 | End: 2019-11-05

## 2019-10-09 ASSESSMENT — ENCOUNTER SYMPTOMS
HEMATURIA: 0
JOINT SWELLING: 0
NERVOUS/ANXIOUS: 1
HEARTBURN: 0
NAUSEA: 0
DYSURIA: 0
FEVER: 0
DIZZINESS: 0
SORE THROAT: 0
ARTHRALGIAS: 0
CHILLS: 0
BACK PAIN: 1
CONSTIPATION: 0
SHORTNESS OF BREATH: 1
FREQUENCY: 0
EYE PAIN: 0
COUGH: 0
BREAST MASS: 0
PALPITATIONS: 0
PARESTHESIAS: 0
HEMATOCHEZIA: 0
ABDOMINAL PAIN: 0
DIARRHEA: 0
HEADACHES: 0
WEAKNESS: 0
MYALGIAS: 0

## 2019-10-09 ASSESSMENT — PATIENT HEALTH QUESTIONNAIRE - PHQ9: SUM OF ALL RESPONSES TO PHQ QUESTIONS 1-9: 3

## 2019-10-09 ASSESSMENT — MIFFLIN-ST. JEOR: SCORE: 1559.36
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
## 2019-10-09 NOTE — PATIENT INSTRUCTIONS
Preventive Health Recommendations  Female Ages 50 - 64    Yearly exam: See your health care provider every year in order to  o Review health changes.   o Discuss preventive care.    o Review your medicines if your doctor has prescribed any.      Get a Pap test every three years (unless you have an abnormal result and your provider advises testing more often).    If you get Pap tests with HPV test, you only need to test every 5 years, unless you have an abnormal result.     You do not need a Pap test if your uterus was removed (hysterectomy) and you have not had cancer.    You should be tested each year for STDs (sexually transmitted diseases) if you're at risk.     Have a mammogram every 1 to 2 years.    Have a colonoscopy at age 50, or have a yearly FIT test (stool test). These exams screen for colon cancer.      Have a cholesterol test every 5 years, or more often if advised.    Have a diabetes test (fasting glucose) every three years. If you are at risk for diabetes, you should have this test more often.     If you are at risk for osteoporosis (brittle bone disease), think about having a bone density scan (DEXA).    Shots: Get a flu shot each year. Get a tetanus shot every 10 years.    Nutrition:     Eat at least 5 servings of fruits and vegetables each day.    Eat whole-grain bread, whole-wheat pasta and brown rice instead of white grains and rice.    Get adequate Calcium and Vitamin D.     Lifestyle    Exercise at least 150 minutes a week (30 minutes a day, 5 days a week). This will help you control your weight and prevent disease.    Limit alcohol to one drink per day.    No smoking.     Wear sunscreen to prevent skin cancer.     See your dentist every six months for an exam and cleaning.    See your eye doctor every 1 to 2 years.    Did you know you can lower your blood pressure with your daily habits?    *Losing 20 pounds of weight lowers blood pressure 5 to 20 points.  *Eating a low-fat diet rich in  fruits, vegetables and low-fat dairy lowers blood pressure 8 to 14 points.  *Eating a low-salt diet lowers blood pressure 2 to 8 points.  *Exercising regularly lowers blood pressure 4 to 9 points.  *Reducing alcohol use lowers blood pressure 2 to 4 points.      It is recommended that you get a vaccination for shingles called Shingrix (given as 2 shots, 2 to 6 months apart), even if you have already had the Zostavax vaccine. Discuss getting the Shingix vaccine from your pharmacist, or schedule an ancillary shot visit here. Some insurances do not cover the cost of these vaccines.      Did you know you can lower your blood pressure with your daily habits?    *Losing 20 pounds of weight lowers blood pressure 5 to 20 points.  *Eating a low-fat diet rich in fruits, vegetables and low-fat dairy lowers blood pressure 8 to 14 points.  *Eating a low-salt diet lowers blood pressure 2 to 8 points.  *Exercising regularly lowers blood pressure 4 to 9 points.  *Reducing alcohol use lowers blood pressure 2 to 4 points.

## 2019-10-10 ENCOUNTER — TRANSFERRED RECORDS (OUTPATIENT)
Dept: HEALTH INFORMATION MANAGEMENT | Facility: CLINIC | Age: 54
End: 2019-10-10

## 2019-10-10 ASSESSMENT — ASTHMA QUESTIONNAIRES: ACT_TOTALSCORE: 22

## 2019-10-15 ENCOUNTER — OFFICE VISIT (OUTPATIENT)
Dept: FAMILY MEDICINE | Facility: CLINIC | Age: 54
End: 2019-10-15
Payer: COMMERCIAL

## 2019-10-15 VITALS
TEMPERATURE: 98.4 F | SYSTOLIC BLOOD PRESSURE: 123 MMHG | WEIGHT: 202 LBS | OXYGEN SATURATION: 98 % | HEART RATE: 80 BPM | BODY MASS INDEX: 31.64 KG/M2 | DIASTOLIC BLOOD PRESSURE: 79 MMHG

## 2019-10-15 DIAGNOSIS — M54.50 ACUTE LEFT-SIDED LOW BACK PAIN WITHOUT SCIATICA: Primary | ICD-10-CM

## 2019-10-15 PROCEDURE — 99213 OFFICE O/P EST LOW 20 MIN: CPT | Performed by: PHYSICIAN ASSISTANT

## 2019-10-15 RX ORDER — CYCLOBENZAPRINE HCL 10 MG
10 TABLET ORAL 3 TIMES DAILY PRN
Qty: 30 TABLET | Refills: 0 | Status: SHIPPED | OUTPATIENT
Start: 2019-10-15 | End: 2019-12-03

## 2019-10-15 NOTE — PROGRESS NOTES
Subjective     Itzel Zacarias is a 53 year old female who presents to clinic today for the following health issues:    HPI   ED/UC Followup:    Facility:  [facility]   Date of visit: 10-   Reason for visit: back pain   Current Status: feels better    Started suddenly on 5 days ago.  No radiation.  No change bowel habits or urination.  Muscle relaxant for ER helps.  No injury.  Does a lot of lifting for work.        Patient Active Problem List   Diagnosis     CARDIOVASCULAR SCREENING; LDL GOAL LESS THAN 160     HCH     Generalized anxiety disorder     Seasonal allergic rhinitis, unspecified chronicity, unspecified trigger     Mild persistent asthma without complication     Major depressive disorder, recurrent episode, mild (H)     Chondromalacia of right knee     Obesity     Past Surgical History:   Procedure Laterality Date     DILATION AND CURETTAGE, HYSTEROSCOPY, ABLATE ENDOMETRIUM NOVASURE, COMBINED  3/4/2013    Procedure: COMBINED DILATION AND CURETTAGE, HYSTEROSCOPY, ABLATE ENDOMETRIUM NOVASURE;  Hysteroscopy with Endometrial Ablation Novasure &  Dilation and Curettage Nobvasure;  Surgeon: Juaquin Scherer MD;  Location: [facility]     TUBAL LIGATION  1995            Social History     Tobacco Use     Smoking status: Never Smoker     Smokeless tobacco: Never Used   Substance Use Topics     Alcohol use: Yes     Family History   Problem Relation Age of Onset     Diabetes Maternal Grandmother      C.A.D. No family hx of      Breast Cancer No family hx of              Reviewed and updated as needed this visit by Provider  Allergies  Meds         Review of Systems   As above      Objective    /79   Pulse 80   Temp 98.4  F (36.9  C) (Oral)   Wt 91.6 kg (202 lb)   SpO2 98%   BMI 31.64 kg/m    Body mass index is 31.64 kg/m .  Physical Exam  Constitutional:       General: She is not in acute distress.     Appearance: She is obese.   Cardiovascular:      Rate and Rhythm: Normal rate and regular rhythm.  "     Heart sounds: Normal heart sounds.   Pulmonary:      Effort: Pulmonary effort is normal.      Breath sounds: Normal breath sounds.   Musculoskeletal:      Lumbar back: She exhibits decreased range of motion and tenderness (mild on left).   Neurological:      Mental Status: She is alert.      Motor: No weakness.      Deep Tendon Reflexes:      Reflex Scores:       Patellar reflexes are 2+ on the right side and 2+ on the left side.           Diagnostic Test Results:  Labs reviewed in Epic        Assessment & Plan     1. Acute left-sided low back pain without sciatica  Continue symptomatic treatment.  return to clinic if not improving.     - cyclobenzaprine (FLEXERIL) 10 MG tablet; Take 1 tablet (10 mg) by mouth 3 times daily as needed for muscle spasms  Dispense: 30 tablet; Refill: 0     BMI:   Estimated body mass index is 31.64 kg/m  as calculated from the following:    Height as of 10/9/19: 1.702 m (5' 7\").    Weight as of this encounter: 91.6 kg (202 lb).               Return in about 1 week (around 10/22/2019) for if not improving.    Stefany Mena PA-C  [facility]      "

## 2019-11-05 ENCOUNTER — OFFICE VISIT (OUTPATIENT)
Dept: FAMILY MEDICINE | Facility: CLINIC | Age: 54
End: 2019-11-05
Payer: COMMERCIAL

## 2019-11-05 VITALS
WEIGHT: 200 LBS | SYSTOLIC BLOOD PRESSURE: 128 MMHG | DIASTOLIC BLOOD PRESSURE: 62 MMHG | HEART RATE: 76 BPM | BODY MASS INDEX: 31.32 KG/M2 | TEMPERATURE: 98.9 F | OXYGEN SATURATION: 98 %

## 2019-11-05 DIAGNOSIS — R20.8 DYSESTHESIA: ICD-10-CM

## 2019-11-05 DIAGNOSIS — M54.50 ACUTE MIDLINE LOW BACK PAIN WITHOUT SCIATICA: Primary | ICD-10-CM

## 2019-11-05 DIAGNOSIS — J45.30 MILD PERSISTENT ASTHMA WITHOUT COMPLICATION: ICD-10-CM

## 2019-11-05 DIAGNOSIS — G47.62 NOCTURNAL LEG CRAMPS: ICD-10-CM

## 2019-11-05 DIAGNOSIS — Z11.4 SCREENING FOR HIV (HUMAN IMMUNODEFICIENCY VIRUS): ICD-10-CM

## 2019-11-05 PROBLEM — M94.261 CHONDROMALACIA OF RIGHT KNEE: Status: ACTIVE | Noted: 2018-12-13

## 2019-11-05 PROCEDURE — 99214 OFFICE O/P EST MOD 30 MIN: CPT | Performed by: FAMILY MEDICINE

## 2019-11-05 PROCEDURE — 36415 COLL VENOUS BLD VENIPUNCTURE: CPT | Performed by: FAMILY MEDICINE

## 2019-11-05 PROCEDURE — 87389 HIV-1 AG W/HIV-1&-2 AB AG IA: CPT | Performed by: FAMILY MEDICINE

## 2019-11-05 RX ORDER — BUDESONIDE AND FORMOTEROL FUMARATE DIHYDRATE 160; 4.5 UG/1; UG/1
2 AEROSOL RESPIRATORY (INHALATION) 2 TIMES DAILY
Qty: 3 INHALER | Refills: 3 | Status: SHIPPED | OUTPATIENT
Start: 2019-11-05 | End: 2021-04-03

## 2019-11-05 NOTE — PROGRESS NOTES
Subjective     Itzel Zacarias is a 53 year old female who presents to clinic today for the following health issues:    HPI   Chief Complaint   Patient presents with     Hypertension     Back Pain     f/u     Itzel Zacarias is a 53 year old female who presents with follow-up of acute low back pain, asthma, and elevated blood pressure reading. Symptom onset has been sudden, improving for a time period of 2 weeks. Severity is described as mild. Course of her symptoms over time is improving. She denies radicular or neurologic symptoms.     Patient also has asthma, mild persistent.  Patient has had asthma for years.  Occasional wheezing and shortness of breath are triggered by colds and relieved by albuterol. She has had a dry cough for 1 week.     She has nocturnal leg cramps which are bothersome, and intermittent lower extremity dysesthesia as well.     Reviewed and updated as needed this visit by Provider  Tobacco  Meds  Problems  Med Hx  Surg Hx  Fam Hx  Soc Hx        Review of Systems   ROS COMP: CONSTITUTIONAL: NEGATIVE for fever, chills, change in weight  ENT/MOUTH: NEGATIVE for ear, mouth and throat problems  RESP:as above  CV: NEGATIVE for chest pain, palpitations or peripheral edema  MUSCULOSKELETAL: NEGATIVE for significant arthralgias or myalgia  NEURO: NEGATIVE for weakness, dizziness or paresthesias  PSYCHIATRIC: HX anxiety and HX depression      Objective    /62   Pulse 76   Temp 98.9  F (37.2  C) (Oral)   Wt 90.7 kg (200 lb)   SpO2 98%   BMI 31.32 kg/m    Body mass index is 31.32 kg/m .  Physical Exam   GENERAL: alert, no distress and obese  EYES: Eyes grossly normal to inspection, PERRL and conjunctivae and sclerae normal  HENT: ear canals and TM's normal, nose and mouth without ulcers or lesions  NECK: no adenopathy, no asymmetry, masses, or scars and thyroid normal to palpation  RESP: lungs clear to auscultation - no rales, rhonchi or wheezes  CV: regular rate and rhythm,  "normal S1 S2, no S3 or S4, no murmur, click or rub, no peripheral edema   MS: no gross musculoskeletal defects noted, no edema  PSYCH: mentation appears normal, affect normal/bright    Diagnostic Test Results:  Labs reviewed in Epic  No results found for any visits on 11/05/19.        Assessment & Plan     (M54.5) Acute midline low back pain without sciatica  (primary encounter diagnosis)  Comment: suspect musculoligamentous cause   Plan: follow-up as needed for recurrence     (J45.30) Mild persistent asthma without complication  Comment: Well controlled with medications without side effects.   Plan: budesonide-formoterol (SYMBICORT) 160-4.5         MCG/ACT Inhaler          (G47.62) Nocturnal leg cramps  Plan: The patient is reassured that these symptoms do not appear to represent a serious or threatening condition.     (R20.8) Dysesthesia  Comment: no evidence of reversible cause; Differential diagnoses would include: neuropathy   Plan: offered EMG, but given mild severity and intermittent timing of symptoms, watchful waiting is safe     (Z11.4) Screening for HIV (human immunodeficiency virus)  Plan: HIV Antigen Antibody Combo             BMI:   Estimated body mass index is 31.32 kg/m  as calculated from the following:    Height as of 10/9/19: 1.702 m (5' 7\").    Weight as of this encounter: 90.7 kg (200 lb).   Weight management plan: Discussed healthy diet and exercise guidelines            Return in about 11 months (around 10/10/2020) for physical.    Anai Velazquez MD  [facility]    "

## 2019-11-05 NOTE — LETTER
[facility]  [address] [place] [address]    November 7, 2019    Itzel Zacarias  [address]   [place] [id number]          Dear Itzel,    Your results are normal    Enclosed is a copy of your results.     Results for orders placed or performed in visit on 11/05/19   HIV Antigen Antibody Combo     Status: None   Result Value Ref Range    HIV Antigen Antibody Combo Nonreactive NR^Nonreactive           If you have any questions or concerns, please call myself or my nurse at [phone].      Sincerely,        Anai Velazquez MD/[facility]

## 2019-11-06 LAB — HIV 1+2 AB+HIV1 P24 AG SERPL QL IA: NONREACTIVE

## 2019-11-06 NOTE — PATIENT INSTRUCTIONS
Schedule your mammogram.    It is recommended that you get a vaccination for shingles called Shingrix (given as 2 shots, 2 to 6 months apart), even if you have already had the Zostavax vaccine. Discuss getting the Shingix vaccine from your pharmacist, or schedule an ancillary shot visit here. Some insurances do not cover the cost of these vaccines.      Anai Velazquez MD

## 2019-12-02 ENCOUNTER — NURSE TRIAGE (OUTPATIENT)
Dept: FAMILY MEDICINE | Facility: CLINIC | Age: 54
End: 2019-12-02

## 2019-12-02 NOTE — TELEPHONE ENCOUNTER
Spoke with pt. States she is experiencing pain on the right side- neck, shoulder and down her right arm. States it's hard for her to use a stapler. Tingling and aching has weakened her arm.  Tingling is constant. Has tried tylenol and ice. Ice is ok for a little bit, get's warm then hurts all over again. Went to the chiropractor and felt relief for a little bit, then it returned. Pain started in her back.  Hurts into her neck and shoulder area, then goes into top part of her arm. Rates pain 9/10. Has had symptoms for 2 weeks. Has a headache off and on and can't sleep. Sometimes has an upset stomach due to the pain. No unsteadiness. No weakness in her right leg.No vision loss or double vision. No injury. No chest pain. No SOB. No dizziness. Advised pt to go to the ER due to headache and she declined. Recommended UC and she said they would just send her to the ER. She requested to see her PCP. Appt scheduled for tomorrow.    Reason for Disposition    Headache (with neurologic deficit)    Additional Information    Negative: Difficult to awaken or acting confused (e.g., disoriented, slurred speech)    Negative: New neurologic deficit that is present NOW, sudden onset of ANY of the following: * Weakness of the face, arm, or leg on one side of the body * Numbness of the face, arm, or leg on one side of the body * Loss of speech or garbled speech    Negative: Sounds like a life-threatening emergency to the triager    Negative: Confusion, disorientation, or hallucinations is the main symptom    Negative: Dizziness is the main symptom    Negative: Followed a head injury within last 3 days    Negative: Unable to urinate (or only a few drops) and bladder feels very full    Negative: Loss of control of bowel or bladder (i.e., incontinence) of new onset    Negative: Back pain with numbness (loss of sensation) in groin or rectal area    Negative: Patient sounds very sick or weak to the triager    Negative: Neurologic deficit that  was brief (now gone), ANY of the following: * Weakness of the face, arm, or leg on one side of the body * Numbness of the face, arm, or leg on one side of the body * Loss of speech or garbled speech    Negative: [place] palsy suspected (i.e., weakness only one side of the face, developing over hours to days, no other symptoms)    Negative: Tingling (e.g., pins and needles) of the face, arm or leg on one side of the body, that is  present now    Neurologic deficit of gradual onset, ANY of the following: * Weakness of the face, arm, or leg on one side of the body * Numbness of the face, arm, or leg on one side of the body * Loss of speech or garbled speech    Neck pain (with neurologic deficit)    Back pain (with neurologic deficit)    Patient wants to be seen    Protocols used: NEUROLOGIC DEFICIT-A-OH

## 2019-12-02 NOTE — TELEPHONE ENCOUNTER
Reason for call:  Patient reporting a symptom    Symptom or request: Tingling in right arm, neck and shoulder.    Duration (how long have symptoms been present): 2 weeks    Have you been treated for this before? No    Additional comments: patient is holding, red flag    Phone Number patient can be reached at:  Cell number on file:    Telephone Information:   Mobile [phone]       Best Time:  asap    Can we leave a detailed message on this number:  YES    Call taken on 12/2/2019 at 4:11 PM by Sarita Anderson

## 2019-12-03 ENCOUNTER — OFFICE VISIT (OUTPATIENT)
Dept: FAMILY MEDICINE | Facility: CLINIC | Age: 54
End: 2019-12-03
Payer: COMMERCIAL

## 2019-12-03 ENCOUNTER — ANCILLARY PROCEDURE (OUTPATIENT)
Dept: GENERAL RADIOLOGY | Facility: CLINIC | Age: 54
End: 2019-12-03
Attending: FAMILY MEDICINE
Payer: COMMERCIAL

## 2019-12-03 VITALS
WEIGHT: 201 LBS | HEIGHT: 67 IN | OXYGEN SATURATION: 99 % | DIASTOLIC BLOOD PRESSURE: 86 MMHG | RESPIRATION RATE: 16 BRPM | TEMPERATURE: 99.1 F | SYSTOLIC BLOOD PRESSURE: 138 MMHG | BODY MASS INDEX: 31.55 KG/M2 | HEART RATE: 84 BPM

## 2019-12-03 DIAGNOSIS — M54.12 RIGHT CERVICAL RADICULOPATHY: ICD-10-CM

## 2019-12-03 DIAGNOSIS — I10 HYPERTENSION GOAL BP (BLOOD PRESSURE) < 140/90: ICD-10-CM

## 2019-12-03 DIAGNOSIS — M94.261 CHONDROMALACIA OF RIGHT KNEE: ICD-10-CM

## 2019-12-03 DIAGNOSIS — M54.12 RIGHT CERVICAL RADICULOPATHY: Primary | ICD-10-CM

## 2019-12-03 PROCEDURE — 72040 X-RAY EXAM NECK SPINE 2-3 VW: CPT

## 2019-12-03 PROCEDURE — 99214 OFFICE O/P EST MOD 30 MIN: CPT | Performed by: FAMILY MEDICINE

## 2019-12-03 RX ORDER — METHYLPREDNISOLONE 4 MG
TABLET, DOSE PACK ORAL
Qty: 21 TABLET | Refills: 0 | Status: SHIPPED | OUTPATIENT
Start: 2019-12-03 | End: 2020-04-08

## 2019-12-03 RX ORDER — DICLOFENAC SODIUM 75 MG/1
75 TABLET, DELAYED RELEASE ORAL 2 TIMES DAILY
Qty: 60 TABLET | Refills: 11 | Status: SHIPPED | OUTPATIENT
Start: 2019-12-03 | End: 2021-02-23

## 2019-12-03 RX ORDER — METHYLPREDNISOLONE 4 MG
TABLET, DOSE PACK ORAL
Qty: 21 TABLET | Refills: 0 | Status: SHIPPED | OUTPATIENT
Start: 2019-12-03 | End: 2019-12-03

## 2019-12-03 ASSESSMENT — PATIENT HEALTH QUESTIONNAIRE - PHQ9: SUM OF ALL RESPONSES TO PHQ QUESTIONS 1-9: 3

## 2019-12-03 ASSESSMENT — MIFFLIN-ST. JEOR: SCORE: 1559.36

## 2019-12-03 NOTE — PATIENT INSTRUCTIONS
Did you know you can lower your blood pressure with your daily habits?    *Losing 20 pounds of weight lowers blood pressure 5 to 20 points.  *Eating a low-fat diet rich in fruits, vegetables and low-fat dairy lowers blood pressure 8 to 14 points.  *Eating a low-salt diet lowers blood pressure 2 to 8 points.  *Exercising regularly lowers blood pressure 4 to 9 points.  *Reducing alcohol use lowers blood pressure 2 to 4 points.    It is recommended that you get a vaccination for shingles called Shingrix (given as 2 shots, 2 to 6 months apart), even if you have already had the Zostavax vaccine. Discuss getting the Shingix vaccine from your pharmacist, or schedule an ancillary shot visit here. Some insurances do not cover the cost of these vaccines.

## 2019-12-03 NOTE — PROGRESS NOTES
"Subjective     Itzel Zacarias is a 53 year old female with asthma and Hx of depression who presents to clinic today for the following health issues:    HPI   Joint Pain    Onset: 2 weeks    Description:   Location: right shoulder and Right Arm, Neck pain  Character: Sharp    Intensity: moderate, severe    Progression of Symptoms: same    Accompanying Signs & Symptoms:  Other symptoms: tingling and weakness of right arm    History:   Previous similar pain: no       Precipitating factors:   Trauma or overuse: no     Alleviating factors:  Improved by: nothing    Therapies Tried and outcome: Tylenol, Ice, Muscle relaxants     Reviewed and updated as needed this visit by Provider  Tobacco  Allergies  Meds  Problems  Med Hx  Surg Hx  Fam Hx         Review of Systems   ROS COMP: CONSTITUTIONAL: NEGATIVE for fever, chills, change in weight  INTEGUMENTARY/SKIN: NEGATIVE for worrisome rashes, moles or lesions  RESP: NEGATIVE for significant cough or SOB  CV: NEGATIVE for chest pain, palpitations or peripheral edema  MUSCULOSKELETAL:neck pain, paresthesias and radicular pain   NEURO: dysesthesias and weakness of right upper extremity   PSYCHIATRIC: HX anxiety      Objective    BP (!) 152/90   Pulse 84   Temp 99.1  F (37.3  C) (Oral)   Resp 16   Ht 1.702 m (5' 7\")   Wt 91.2 kg (201 lb)   SpO2 99%   Breastfeeding No   BMI 31.48 kg/m    Body mass index is 31.48 kg/m .  Physical Exam   GENERAL: alert, no distress and obese  EYES: Eyes grossly normal to inspection, PERRL and conjunctivae and sclerae normal  NECK: no asymmetry, masses, or scars, thyroid normal to palpation and tender over right paraspinous region   RESP: lungs clear to auscultation - no rales, rhonchi or wheezes  CV: regular rate and rhythm, normal S1 S2, no S3 or S4, no murmur, click or rub, no peripheral edema    MS: no gross musculoskeletal defects noted, no edema  NEURO: Normal strength and tone, sensory deficit of right upper extremity and " "mentation intact  PSYCH: mentation appears normal, affect normal/bright    Diagnostic Test Results:  Labs reviewed in Epic  No results found for any visits on 12/03/19.        Assessment & Plan     (M54.12) Right cervical radiculopathy  (primary encounter diagnosis)  Plan: XR Cervical Spine 2/3 Views, SANIYA PT, HAND, AND         CHIROPRACTIC REFERRAL, methylPREDNISolone         (MEDROL DOSEPAK) 4 MG tablet therapy pack,         DISCONTINUED: methylPREDNISolone (MEDROL         DOSEPAK) 4 MG tablet therapy pack        Discussed risks and benefits of this medication. Over the counter pain medication as needed, ice or heat as she finds helpful, avoidance of aggravating activities, and return for persistence for consideration of further imaging or referral.     (M94.261) Chondromalacia of right knee  Plan: diclofenac (VOLTAREN) 75 MG EC tablet          (I10) Hypertension goal BP (blood pressure) < 140/90  Plan: Counseled to make better food choices, exercise as tolerated, and lose weight. Follow up ancillary blood pressure recheck in one month or sooner for worsening of symptoms       BMI:   Estimated body mass index is 31.48 kg/m  as calculated from the following:    Height as of this encounter: 1.702 m (5' 7\").    Weight as of this encounter: 91.2 kg (201 lb).   Weight management plan: Discussed healthy diet and exercise guidelines            Return in about 1 month (around 1/3/2020) for free nurse only blood pressure check.    Anai Velazquez MD  [facility]    "

## 2019-12-04 ENCOUNTER — TELEPHONE (OUTPATIENT)
Dept: FAMILY MEDICINE | Facility: CLINIC | Age: 54
End: 2019-12-04

## 2019-12-04 NOTE — TELEPHONE ENCOUNTER
Called patient. LVM to call RN hotline ([phone]).    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 Follow-up with me if your symptoms are not improving with the medication and physical therapy.

## 2019-12-04 NOTE — RESULT ENCOUNTER NOTE
Please call patient:    You do have neck arthritis, which could put you at risk for a pinched nerve. Follow-up with me if your symptoms are not improving with the medication and physical therapy.    Anai Velazquez MD

## 2019-12-06 NOTE — TELEPHONE ENCOUNTER
Patient returned call and was notified of provider result message as written. Pt verbalized understanding and had no further questions.

## 2019-12-20 ENCOUNTER — THERAPY VISIT (OUTPATIENT)
Dept: PHYSICAL THERAPY | Facility: CLINIC | Age: 54
End: 2019-12-20
Attending: FAMILY MEDICINE
Payer: COMMERCIAL

## 2019-12-20 DIAGNOSIS — M54.12 RIGHT CERVICAL RADICULOPATHY: ICD-10-CM

## 2019-12-20 PROCEDURE — 97161 PT EVAL LOW COMPLEX 20 MIN: CPT | Mod: GP | Performed by: PHYSICAL THERAPIST

## 2019-12-20 PROCEDURE — 97110 THERAPEUTIC EXERCISES: CPT | Mod: GP | Performed by: PHYSICAL THERAPIST

## 2019-12-20 PROCEDURE — 97140 MANUAL THERAPY 1/> REGIONS: CPT | Mod: GP | Performed by: PHYSICAL THERAPIST

## 2019-12-20 PROCEDURE — 97012 MECHANICAL TRACTION THERAPY: CPT | Mod: GP | Performed by: PHYSICAL THERAPIST

## 2019-12-20 NOTE — PROGRESS NOTES
[facility] Initial Evaluation  Subjective:  The history is provided by the patient.   Itzel Zacarias being seen for neck/UE pain sxs .   Problem began 11/1/2019. Problem occurred: not sure   and reported as 7/10 on pain scale. General health as reported by patient is fair. Pertinent medical history includes:  Asthma, depression and numbness/tingling.      Current medications:  Anti-depressants, pain medication and steroids.   Primary job tasks include:  Lifting/carrying.   and is constant. Pain is worse during the night. Since onset symptoms are unchanged. Special tests:  X-ray.     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    Barriers include:  None as reported by patient.  Red flags:  None as reported by patient.  Type of problem:  Cervical spine   Condition occurred with:  Repetition/overuse and degenerative joint disease. This is a new condition   Problem details: Pt describes neck pain, loss of motion, throbbing pain in upper back/upper arm, numbness to her Rt hand/tingling-constant. She notes Rt UE weakness. .   Patient reports pain:  Cervical right side and lower cervical spine. Radiates to:  Shoulder right. Associated symptoms:  Loss of motion/stiffness, loss of strength, numbness and tingling. Symptoms are exacerbated by lying down, driving, certain positions, lifting and change of position and relieved by nothing.                      Objective:  System              Cervical/Thoracic Evaluation    AROM:  AROM Cervical:    Flexion:            Min loss   Extension:       Retxn 60% loss ++, extension 50% loss +++ UE sxs +spurlings   Rotation:         Left: nil      Right: 25% loss ++   Side Bend:      Left: nil      Right:  Min       Headaches: cervical  Cervical Myotomes:        C4 (shrug):  Right: 5  C5 (Deltoid):  Right: 5  C6 (Biceps):  Right: 5  C7 (Triceps):  Right: 4  C8 (Thumb Ext): Right: 5  T1 (Intrinsics): Right: 5  DTR's:   not assessed          Cervical Dermatomes:  not assessed                    Cervical Palpation:      Tenderness present at Right:    Upper Trap; Levator; Erector Spinae and Suboccipitals    Cervical Stability/Joint Clearing:      Right positive at:  1st Rib; 1st Rib Expansion and TOS Screen  Spinal Segmental Conclusions:    Level:  ERS right and Hypo at C6, C7 and T1      Cord Sign:  not assessed         Shoulder Evaluation:        Palpation:      Right shoulder tenderness present at: Subscapularis; Levator and Upper Trap                                     General     ROS    Assessment/Plan:    Patient is a 54 year old female with cervical and thoracic complaints.    Patient has the following significant findings with corresponding treatment plan.                Diagnosis 1:  Cerv Radic Rt   Pain -  mechanical traction, manual therapy, self management, directional preference exercise and home program  Decreased ROM/flexibility - manual therapy, therapeutic exercise and home program  Decreased joint mobility - manual therapy and therapeutic exercise  Decreased strength - therapeutic exercise, therapeutic activities and home program  Decreased proprioception - neuro re-education and therapeutic activities  Impaired muscle performance - neuro re-education  Decreased function - therapeutic activities  Impaired posture - neuro re-education    Therapy Evaluation Codes:   1) History comprised of:   Personal factors that impact the plan of care:      Coping style, Overall behavior pattern and Past/current experiences.    Comorbidity factors that impact the plan of care are:      Asthma and Depression.     Medications impacting care: Anti-depressant, Pain and Steroids.  2) Examination of Body Systems comprised of:   Body structures and functions that impact the plan of care:      Cervical spine and Thoracic Spine.   Activity limitations that impact the plan of care are:      Cooking, Driving, Dressing, Lifting and  Sleeping.  3) Clinical presentation characteristics are:   Stable/Uncomplicated.  4) Decision-Making    Low complexity using standardized patient assessment instrument and/or measureable assessment of functional outcome.  Cumulative Therapy Evaluation is: Low complexity.    Previous and current functional limitations:  (See Goal Flow Sheet for this information)    Short term and Long term goals: (See Goal Flow Sheet for this information)     Communication ability:  Patient appears to be able to clearly communicate and understand verbal and written communication and follow directions correctly.  Treatment Explanation - The following has been discussed with the patient:   RX ordered/plan of care  Anticipated outcomes  Possible risks and side effects  This patient would benefit from PT intervention to resume normal activities.   Rehab potential is good.    Frequency:  2 X week, once daily tapering to 1x per wk as able   Duration:  for 6 weeks  Discharge Plan:  Achieve all LTG.  Independent in home treatment program.  Reach maximal therapeutic benefit.    Please refer to the daily flowsheet for treatment today, total treatment time and time spent performing 1:1 timed codes.

## 2019-12-20 NOTE — LETTER
SANIYA KUMAR PT  [address]  [address]  [place] [address]  [phone]    2019    Re: Itzel Zacarias   :   1965  MRN:  [id number]   REFERRING PHYSICIAN:   MD SANIYA Benoit PT  Date of Initial Evaluation:  2019  Visits:  Rxs Used: 1  Reason for Referral:  Right cervical radiculopathy    EVALUATION SUMMARY    [facility] Initial Evaluation  Subjective:  The history is provided by the patient.   Itzel Zacarias being seen for neck/UE pain sxs .   Problem began 2019. Problem occurred: not sure   and reported as 7/10 on pain scale. General health as reported by patient is fair. Pertinent medical history includes:  Asthma, depression and numbness/tingling.  Current medications:  Anti-depressants, pain medication and steroids.   Primary job tasks include:  Lifting/carrying.   and is constant. Pain is worse during the night. Since onset symptoms are unchanged. Special tests:  X-ray.     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    Barriers include:  None as reported by patient.  Red flags:  None as reported by patient.  Type of problem:  Cervical spine   Condition occurred with:  Repetition/overuse and degenerative joint disease. This is a new condition   Problem details: Pt describes neck pain, loss of motion, throbbing pain in upper back/upper arm, numbness to her Rt hand/tingling-constant. She notes Rt UE weakness.   Patient reports pain:  Cervical right side and lower cervical spine. Radiates to:  Shoulder right. Associated symptoms:  Loss of motion/stiffness, loss of strength, numbness and tingling. Symptoms are exacerbated by lying down, driving, certain positions, lifting and change of position and relieved by nothing.           Objective:  Cervical/Thoracic Evaluation  AROM:  AROM Cervical:  Flexion:            Min loss   Extension:       Retxn 60% loss ++, extension  50% loss +++ UE sxs +spurlings   Rotation:         Left: nil      Right: 25% loss ++   Side Bend:      Left: nil      Right:  Min     Headaches: cervical    Re: Itzel C Deann   :   1965    Cervical Myotomes:    C4 (shrug):  Right: 5  C5 (Deltoid):  Right: 5  C6 (Biceps):  Right: 5  C7 (Triceps):  Right: 4  C8 (Thumb Ext): Right: 5  T1 (Intrinsics): Right: 5    DTR's:  not assessed  Cervical Dermatomes:  not assessed    Cervical Palpation:    Tenderness present at Right:    Upper Trap; Levator; Erector Spinae and Suboccipitals    Cervical Stability/Joint Clearing:    Right positive at:  1st Rib; 1st Rib Expansion and TOS Screen    Spinal Segmental Conclusions:    Level:  ERS right and Hypo at C6, C7 and T1    Cord Sign:  not assessed    Shoulder Evaluation:  Palpation:    Right shoulder tenderness present at: Subscapularis; Levator and Upper Trap     Assessment/Plan:    Patient is a 54 year old female with cervical and thoracic complaints.    Patient has the following significant findings with corresponding treatment plan.                Diagnosis 1:  Cerv Radic Rt   Pain -  mechanical traction, manual therapy, self management, directional preference exercise and home program  Decreased ROM/flexibility - manual therapy, therapeutic exercise and home program  Decreased joint mobility - manual therapy and therapeutic exercise  Decreased strength - therapeutic exercise, therapeutic activities and home program  Decreased proprioception - neuro re-education and therapeutic activities  Impaired muscle performance - neuro re-education  Decreased function - therapeutic activities  Impaired posture - neuro re-education    Therapy Evaluation Codes:   1) History comprised of:   Personal factors that impact the plan of care:      Coping style, Overall behavior pattern and Past/current experiences.    Comorbidity factors that impact the plan of care are:      Asthma and Depression.     Medications impacting care:  Anti-depressant, Pain and Steroids.  2) Examination of Body Systems comprised of:   Body structures and functions that impact the plan of care:      Cervical spine and Thoracic Spine.   Activity limitations that impact the plan of care are:      Cooking, Driving, Dressing, Lifting and Sleeping.  Re: Itzel Zacarias   :   1965    3) Clinical presentation characteristics are:   Stable/Uncomplicated.  4) Decision-Making    Low complexity using standardized patient assessment instrument and/or measureable assessment of functional outcome.  Cumulative Therapy Evaluation is: Low complexity.    Previous and current functional limitations:  (See Goal Flow Sheet for this information)    Short term and Long term goals: (See Goal Flow Sheet for this information)     Communication ability:  Patient appears to be able to clearly communicate and understand verbal and written communication and follow directions correctly.  Treatment Explanation - The following has been discussed with the patient:   RX ordered/plan of care  Anticipated outcomes  Possible risks and side effects  This patient would benefit from PT intervention to resume normal activities.   Rehab potential is good.    Frequency:  2 X week, once daily tapering to 1x per wk as able   Duration:  for 6 weeks  Discharge Plan:  Achieve all LTG.  Independent in home treatment program.  Reach maximal therapeutic benefit.    Please refer to the daily flowsheet for treatment today, total treatment time and time spent performing 1:1 timed codes.       Thank you for your referral.    INQUIRIES  Therapist: Akin Nance PT  SANIYA KUMAR PT  [address]  [address]  [place] [address]  Phone: [phone]  Fax: [phone]

## 2019-12-23 ENCOUNTER — THERAPY VISIT (OUTPATIENT)
Dept: PHYSICAL THERAPY | Facility: CLINIC | Age: 54
End: 2019-12-23
Attending: FAMILY MEDICINE
Payer: COMMERCIAL

## 2019-12-23 DIAGNOSIS — M54.12 RIGHT CERVICAL RADICULOPATHY: Primary | ICD-10-CM

## 2019-12-23 PROCEDURE — 97012 MECHANICAL TRACTION THERAPY: CPT | Mod: GP

## 2019-12-23 PROCEDURE — 97110 THERAPEUTIC EXERCISES: CPT | Mod: GP

## 2019-12-23 PROCEDURE — 97140 MANUAL THERAPY 1/> REGIONS: CPT | Mod: GP

## 2020-03-31 DIAGNOSIS — J30.2 SEASONAL ALLERGIC RHINITIS: ICD-10-CM

## 2020-04-01 RX ORDER — FLUTICASONE PROPIONATE 50 MCG
SPRAY, SUSPENSION (ML) NASAL
Qty: 16 G | Refills: 1 | Status: SHIPPED | OUTPATIENT
Start: 2020-04-01 | End: 2020-10-29

## 2020-04-08 ENCOUNTER — TELEPHONE (OUTPATIENT)
Dept: PHARMACY | Facility: CLINIC | Age: 55
End: 2020-04-08

## 2020-04-08 NOTE — TELEPHONE ENCOUNTER
Called to follow-up for MTM, did not complete visit d/t patient's insurance not active/pending renewal. She does note Symbicort and albuterol nebs are expensive.    Provided her with below contact information, which she will call:    1. Contact the [place] Prescription Assistance Program/Fund (Arlin Hannon and Brenda Fong) at [phone].    Maria Elena Tate, PharmD  Medication Therapy Management Pharmacist  [phone]

## 2020-05-13 ENCOUNTER — TELEPHONE (OUTPATIENT)
Dept: FAMILY MEDICINE | Facility: CLINIC | Age: 55
End: 2020-05-13

## 2020-05-13 NOTE — TELEPHONE ENCOUNTER
Okay for alternatives:  Proventil HFA 2 puffs q 4 hours as needed for cough or wheezing #18 g, 3 refills and Dulera 200/5 mcg 2 puffs BID #3 inhaler x 1 refill     Anai Velazquez MD

## 2020-05-13 NOTE — TELEPHONE ENCOUNTER
May 13, 2020 I spoke with name, he/she is in need of financial assistance for medication.    We reviewed the Pharmacy Assistance Fund $500, the Prescription Assistance Program for manfacturer brand name assistance programs, gross income, insurance and Rx list.  Itzel is over income for the Pharmacy Assistance Funding.    Dr. Abraham Robbins is over income for the Symbicort and Ventolin HFA assistance programs.  [employer] has a program for Dulera & Proventil HFA that Itzel should qualify for.  If Dulera & Proventil HFA would be appropriate alternatives, I do need the dose information for the [employer] application.    Thanks so much for your help!    Arlin Hannon  Prescription   Pharmacy Assistance  [phone]

## 2020-06-17 ENCOUNTER — TELEPHONE (OUTPATIENT)
Dept: FAMILY MEDICINE | Facility: CLINIC | Age: 55
End: 2020-06-17

## 2020-06-17 NOTE — TELEPHONE ENCOUNTER
Itzel has been approved to the [employer] assistance program for Dulera & Proventil HFA through May 2021. She will receive this medication at no cost through the enrollment period.    A 90 day supply of Dulera & Proventil HFA  will be delivered to Itzel's home within 7-10 business days. She has been informed of this approval.    She will contact my office for refills as we must work directly with the .  I will note EPIC as each refill is requested.    Thank you,    Brenda Hernandez  Prescription Assistance

## 2020-06-24 ENCOUNTER — TELEPHONE (OUTPATIENT)
Dept: PHARMACY | Facility: CLINIC | Age: 55
End: 2020-06-24

## 2020-06-24 NOTE — TELEPHONE ENCOUNTER
Called to schedule MTM appt, no answer, left voicemail. Unsure if pt has active coverage, provided her with MTM scheduling line at [phone].    Maria Elena Tate, PharmD  Medication Therapy Management Pharmacist  [phone]

## 2020-09-02 NOTE — TELEPHONE ENCOUNTER
We have been unable to reach this patient for MTM follow-up after several attempts. We will stop reaching out to the patient at this time. Please let us know if we can assist in this patient's care in the future.     Routing to PCP as MABLE Tate, PharmD  Medication Therapy Management Pharmacist  [phone]

## 2020-10-15 ENCOUNTER — NURSE TRIAGE (OUTPATIENT)
Dept: NURSING | Facility: CLINIC | Age: 55
End: 2020-10-15

## 2020-10-15 ENCOUNTER — VIRTUAL VISIT (OUTPATIENT)
Dept: URGENT CARE | Facility: CLINIC | Age: 55
End: 2020-10-15

## 2020-10-15 DIAGNOSIS — Z20.822 EXPOSURE TO COVID-19 VIRUS: Primary | ICD-10-CM

## 2020-10-15 DIAGNOSIS — J30.2 SEASONAL ALLERGIC RHINITIS, UNSPECIFIED TRIGGER: ICD-10-CM

## 2020-10-15 PROCEDURE — 99213 OFFICE O/P EST LOW 20 MIN: CPT | Mod: 95 | Performed by: INTERNAL MEDICINE
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
## 2020-10-15 NOTE — TELEPHONE ENCOUNTER
Pt needs covid testing to go back to work on 10/26/2020    Is exposed to mom who tested + for covid 19 10/12/2020 they live in same house  Pt states she has been asymptomatic   Hx of allergies & asthma    Denies any SOB  Some nasal congestion heard on call.  Pt is unable to complete [facility]  [facility] virtual appt to be set up for today  Reviewed care advice & when to call back  Pt agrees with plan  Magnolia Rodgers RN  [facility] Nurse Advisors    Reason for Disposition    [1] COVID-19 EXPOSURE (Close Contact) within last 14 days AND [2] needs COVID-19 lab test to return to work AND [3] NO symptoms    Additional Information    Negative: COVID-19 has been diagnosed by a healthcare provider (HCP)    Negative: COVID-19 lab test positive    Negative: [1] Symptoms of COVID-19 (e.g., cough, fever, SOB, or others) AND [2] lives in an area with community spread    Negative: [1] Symptoms of COVID-19 (e.g., cough, fever, SOB, or others) AND [2] within 14 days of EXPOSURE (close contact) with diagnosed or suspected COVID-19 patient    Negative: [1] Symptoms of COVID-19 (e.g., cough, fever, SOB, or others) AND [2] within 14 days of travel from high-risk area for COVID-19 community spread (identified by CDC)    Negative: [1] Difficulty breathing (shortness of breath) occurs AND [2] onset > 14 days after COVID-19 EXPOSURE (Close Contact) AND [3] no community spread where patient lives    Negative: [1] Dry cough occurs AND [2] onset > 14 days after COVID-19 EXPOSURE AND [3] no community spread where patient lives    Negative: [1] Wet cough (i.e., white-yellow, yellow, green, or cam colored sputum) AND [2] onset > 14 days after COVID-19 EXPOSURE AND [3] no community spread where patient lives    Negative: [1] Common cold symptoms AND [2] onset > 14 days after COVID-19 EXPOSURE AND [3] no community spread where patient lives    [facility] Specific Disposition  - [facility] Specific Patient Instructions  COVID 19 Nurse  Triage Plan/Patient Instructions    Please be aware that novel coronavirus (COVID-19) may be circulating in the community. If you develop symptoms such as fever, cough, or SOB or if you have concerns about the presence of another infection including coronavirus (COVID-19), please contact your health care provider or visit [email or web address].       Virtual Visit with provider recommended. Reference Visit Selection Guide.    Thank you for taking steps to prevent the spread of this virus.  Limit your contact with others.  Wear a simple mask to cover your cough.  Wash your hands well and often.    Resources  [facility]: About COVID-19: [email or web address]  CDC: What to Do If You're Sick: www.cdc.gov/coronavirus/2019-ncov/about/steps-when-sick.html  CDC: Ending Home Isolation: www.cdc.gov/coronavirus/2019-ncov/hcp/disposition-in-home-patients.html   CDC: Caring for Someone: www.cdc.gov/coronavirus/2019-ncov/if-you-are-sick/care-for-someone.html   [facility]: Interim Guidance for Hospital Discharge to Home: www.[facility].[place]/diseases/coronavirus/hcp/hospdischarge.pdf  [facility] clinical trials (COVID-19 research studies): clinicalaffairs.[facility]-clinical-trials   Below are the COVID-19 hotlines at the [place] Department of Health ([facility]). Interpreters are available.   For health questions: Call 463-036-5621 or 1-777.473.8379 (7 a.m. to 7 p.m.)  For questions about schools and childcare: Call 510-483-2082 or 1-847.660.9599 (7 a.m. to 7 p.m.)    Protocols used: CORONAVIRUS (COVID-19) EXPOSURE-A- 8.4.20

## 2020-10-20 DIAGNOSIS — Z20.822 EXPOSURE TO COVID-19 VIRUS: ICD-10-CM

## 2020-10-20 PROCEDURE — U0003 INFECTIOUS AGENT DETECTION BY NUCLEIC ACID (DNA OR RNA); SEVERE ACUTE RESPIRATORY SYNDROME CORONAVIRUS 2 (SARS-COV-2) (CORONAVIRUS DISEASE [COVID-19]), AMPLIFIED PROBE TECHNIQUE, MAKING USE OF HIGH THROUGHPUT TECHNOLOGIES AS DESCRIBED BY CMS-2020-01-R: HCPCS | Performed by: INTERNAL MEDICINE

## 2020-10-21 LAB
SARS-COV-2 RNA SPEC QL NAA+PROBE: ABNORMAL
SPECIMEN SOURCE: ABNORMAL

## 2020-10-22 ENCOUNTER — TELEPHONE (OUTPATIENT)
Dept: FAMILY MEDICINE | Facility: CLINIC | Age: 55
End: 2020-10-22

## 2020-10-22 NOTE — TELEPHONE ENCOUNTER
"Coronavirus (COVID-19) Notification    Caller Name (Patient, parent, daughter/son, grandparent, etc)  Patient.    Reason for call  Notify of Positive Coronavirus (COVID-19) lab results, assess symptoms,  review [facility] recommendations    Lab Result    Lab test:  2019-nCoV rRt-PCR or SARS-CoV-2 PCR    Oropharyngeal AND/OR nasopharyngeal swabs is POSITIVE for 2019-nCoV RNA/SARS-COV-2 PCR (COVID-19 virus)    RN Recommendations/Instructions per [facility] Coronavirus COVID-19 recommendations    Brief introduction script  Introduce self then review script:  \"I am calling on behalf of [employer].  We were notified that your Coronavirus test (COVID-19) for was POSITIVE for the virus.  I have some information to relay to you but first I wanted to mention that the [place] Dept of Health will be contacting you shortly [it's possible MD already called Patient] to talk to you more about how you are feeling and other people you have had contact with who might now also have the virus.  Also, [facility] is Partnering with the [facility] for Covid-19 research, you may be contacted directly by research staff.\"    Assessment (Inquire about Patient's current symptoms)   Assessment   Current Symptoms at time of phone call: (if no symptoms, document No symptoms] No    Symptoms onset (if applicable) No      If at time of call, Patients symptoms hare worsened, the Patient should contact 911 or have someone drive them to Emergency Dept promptly:      If Patient calling 911, inform 911 personal that you have tested positive for the Coronavirus (COVID-19).  Place mask on and await 911 to arrive.    If Emergency Dept, If possible, please have another adult drive you to the Emergency Dept but you need to wear mask when in contact with other people.      Review information with Patient    Your result was positive. This means you have COVID-19 (coronavirus).  We have sent you a letter that reviews the information " that I'll be reviewing with you now.    How can I protect others?    If you have symptoms: stay home and away from others (self-isolate) until:    You've had no fever--and no medicine that reduces fever--for 1 full day (24 hours). And       Your other symptoms have gotten better. For example, your cough or breathing has improved. And     At least 10 days have passed since your symptoms started. (If you've been told by a doctor that you have a weak immune system, wait 20 days.)     If you don't have symptoms: Stay home and away from others (self-isolate) until at least 10 days have passed since your first positive COVID-19 test. (Date test collected)    During this time:    Stay in your own room, including for meals. Use your own bathroom if you can.    Stay away from others in your home. No hugging, kissing or shaking hands. No visitors.     Don't go to work, school or anywhere else.     Clean  high touch  surfaces often (doorknobs, counters, handles, etc.). Use a household cleaning spray or wipes. You'll find a full list on the EPA website at www.epa.gov/pesticide-registration/list-n-disinfectants-use-against-sars-cov-2.     Cover your mouth and nose with a mask, tissue or other face covering to avoid spreading germs.    Wash your hands and face often with soap and water.    Caregivers in these groups are at risk for severe illness due to COVID-19:  o People 65 years and older  o People who live in a nursing home or long-term care facility  o People with chronic disease (lung, heart, cancer, diabetes, kidney, liver, immunologic)  o People who have a weakened immune system, including those who:  - Are in cancer treatment  - Take medicine that weakens the immune system, such as corticosteroids  - Had a bone marrow or organ transplant  - Have an immune deficiency  - Have poorly controlled HIV or AIDS  - Are obese (body mass index of 40 or higher)  - Smoke regularly    Caregivers should wear gloves while washing  dishes, handling laundry and cleaning bedrooms and bathrooms.    Wash and dry laundry with special caution. Don't shake dirty laundry, and use the warmest water setting you can.    If you have a weakened immune system, ask your doctor about other actions you should take.    For more tips, go to www.cdc.gov/coronavirus/2019-ncov/downloads/10Things.pdf.    You should not go back to work until you meet the guidelines above for ending your home isolation. You don't need to be retested for COVID-19 before going back to work--studies show that you won't spread the virus if it's been at least 10 days since your symptoms started (or 20 days, if you have a weak immune system).    Employers: This document serves as formal notice of your employee's medical guidelines for going back to work. They must meet the above guidelines before going back to work in person.    How can I take care of myself?    1. Get lots of rest. Drink extra fluids (unless a doctor has told you not to).    2. Take Tylenol (acetaminophen) for fever or pain. If you have liver or kidney problems, ask your family doctor if it's okay to take Tylenol.     Take either:     650 mg (two 325 mg pills) every 4 to 6 hours, or     1,000 mg (two 500 mg pills) every 8 hours as needed.     Note: Don't take more than 3,000 mg in one day. Acetaminophen is found in many medicines (both prescribed and over-the-counter medicines). Read all labels to be sure you don't take too much.    For children, check the Tylenol bottle for the right dose (based on their age or weight).    3. If you have other health problems (like cancer, heart failure, an organ transplant or severe kidney disease): Call your specialty clinic if you don't feel better in the next 2 days.    4. Know when to call 911: Emergency warning signs include:    Trouble breathing or shortness of breath    Pain or pressure in the chest that doesn't go away    Feeling confused like you haven't felt before, or not  being able to wake up    Bluish-colored lips or face    5. Sign up for [employer]. We know it's scary to hear that you have COVID-19. We want to track your symptoms to make sure you're okay over the next 2 weeks. Please look for an email from [employer]--this is a free, online program that we'll use to keep in touch. To sign up, follow the link in the email. Learn more at [email or web address].    Where can I get more information?    [facility]: [email or web address]    Coronavirus Basics: www.health.[facility].[place]./diseases/coronavirus/basics.html    What to Do If You're Sick: www.cdc.gov/coronavirus/2019-ncov/about/steps-when-sick.html    Ending Home Isolation: www.cdc.gov/coronavirus/2019-ncov/hcp/disposition-in-home-patients.html     Caring for Someone with COVID-19: www.cdc.gov/coronavirus/2019-ncov/if-you-are-sick/care-for-someone.html     [facility] clinical trials (COVID-19 research studies): clinicalaffairs.[facility]-clinical-trials     A Positive COVID-19 letter will be sent via [employer] or the mail. (Exception, no letters sent to Presurgerical/Preprocedure Patients)      Sandip Hercules Nurse Advisor 10/22/2020 1:49 PM

## 2020-10-22 NOTE — TELEPHONE ENCOUNTER
Coronavirus (COVID-19) Notification    Reason for call  Notify of POSITIVE  COVID-19 lab result, assess symptoms,  review [facility] recommendations    Lab Result   Lab test for 2019-nCoV rRt-PCR or SARS-COV-2 PCR  Oropharyngeal AND/OR nasopharyngeal swabs were POSITIVE for 2019-nCoV RNA [OR] SARS-COV-2 RNA (COVID-19) RNA     We have been unable to reach Patient by phone at this time to notify of their Positive COVID-19 result.  Left voicemail message requesting a call back to [phone] [facility] for results.        POSITIVE COVID-19 Letter sent.    [Name]  Sarita Pritchard LPN

## 2020-10-29 DIAGNOSIS — J30.2 SEASONAL ALLERGIC RHINITIS: ICD-10-CM

## 2020-10-29 DIAGNOSIS — Z20.822 SUSPECTED 2019 NOVEL CORONAVIRUS INFECTION: Primary | ICD-10-CM

## 2020-10-29 PROCEDURE — U0003 INFECTIOUS AGENT DETECTION BY NUCLEIC ACID (DNA OR RNA); SEVERE ACUTE RESPIRATORY SYNDROME CORONAVIRUS 2 (SARS-COV-2) (CORONAVIRUS DISEASE [COVID-19]), AMPLIFIED PROBE TECHNIQUE, MAKING USE OF HIGH THROUGHPUT TECHNOLOGIES AS DESCRIBED BY CMS-2020-01-R: HCPCS | Performed by: FAMILY MEDICINE

## 2020-10-29 RX ORDER — FLUTICASONE PROPIONATE 50 MCG
SPRAY, SUSPENSION (ML) NASAL
Qty: 16 G | Refills: 1 | Status: SHIPPED | OUTPATIENT
Start: 2020-10-29 | End: 2021-04-03

## 2020-10-30 ENCOUNTER — TELEPHONE (OUTPATIENT)
Dept: EMERGENCY MEDICINE | Facility: CLINIC | Age: 55
End: 2020-10-30

## 2020-10-30 LAB
SARS-COV-2 RNA SPEC QL NAA+PROBE: ABNORMAL
SPECIMEN SOURCE: ABNORMAL

## 2020-10-30 NOTE — TELEPHONE ENCOUNTER
Coronavirus (COVID-19) Notification    Reason for call  Notify of POSITIVE  COVID-19 lab result, assess symptoms,  review [facility] recommendations    Lab Result   Lab test for 2019-nCoV rRt-PCR or SARS-COV-2 PCR  Oropharyngeal AND/OR nasopharyngeal swabs were POSITIVE for 2019-nCoV RNA [OR] SARS-COV-2 RNA (COVID-19) RNA     We have been unable to reach Patient by phone at this time to notify of their Positive COVID-19 result.  Left voicemail message requesting a call back to [phone] [facility] for results.        POSITIVE COVID-19 Letter sent.    [Name]  Kurt Mendez RN  [facility] - [facility]  COVID19 Results Team RN  Ph# [phone]

## 2020-10-30 NOTE — TELEPHONE ENCOUNTER
"Coronavirus (COVID-19) Notification    Caller Name (Patient, parent, daughter/son, grandparent, etc)  Pt    Reason for call  Notify of Positive Coronavirus (COVID-19) lab results, assess symptoms,  review [facility] [place] [facility] recommendations    Lab Result    Lab test:  2019-nCoV rRt-PCR or SARS-CoV-2 PCR    Oropharyngeal AND/OR nasopharyngeal swabs is POSITIVE for 2019-nCoV RNA/SARS-COV-2 PCR (COVID-19 virus)    RN Recommendations/Instructions per [facility] [place] [facility] Coronavirus COVID-19 recommendations    Brief introduction script  Introduce self then review script:  \"I am calling on behalf of [employer].  We were notified that your Coronavirus test (COVID-19) for was POSITIVE for the virus.  I have some information to relay to you but first I wanted to mention that the [place] Dept of Health will be contacting you shortly [it's possible MD already called Patient] to talk to you more about how you are feeling and other people you have had contact with who might now also have the virus.  Also, [facility] [place] [facility] is Partnering with the [facility] for Covid-19 research, you may be contacted directly by research staff.\"    Assessment (Inquire about Patient's current symptoms)   Assessment   Current Symptoms at time of phone call: (if no symptoms, document No symptoms] no   Symptoms onset (if applicable) no     If at time of call, Patients symptoms hare worsened, the Patient should contact 911 or have someone drive them to Emergency Dept promptly:      If Patient calling 911, inform 911 personal that you have tested positive for the Coronavirus (COVID-19).  Place mask on and await 911 to arrive.    If Emergency Dept, If possible, please have another adult drive you to the Emergency Dept but you need to wear mask when in contact with other people.      Review information with Patient    Your result was positive. This means you have COVID-19 (coronavirus).  We have sent you a letter that reviews the information that " I'll be reviewing with you now.    How can I protect others?    If you have symptoms: stay home and away from others (self-isolate) until:    You've had no fever--and no medicine that reduces fever--for 1 full day (24 hours). And       Your other symptoms have gotten better. For example, your cough or breathing has improved. And     At least 10 days have passed since your symptoms started. (If you've been told by a doctor that you have a weak immune system, wait 20 days.)     If you don't have symptoms: Stay home and away from others (self-isolate) until at least 10 days have passed since your first positive COVID-19 test. (Date test collected)    During this time:    Stay in your own room, including for meals. Use your own bathroom if you can.    Stay away from others in your home. No hugging, kissing or shaking hands. No visitors.     Don't go to work, school or anywhere else.     Clean  high touch  surfaces often (doorknobs, counters, handles, etc.). Use a household cleaning spray or wipes. You'll find a full list on the EPA website at www.epa.gov/pesticide-registration/list-n-disinfectants-use-against-sars-cov-2.     Cover your mouth and nose with a mask, tissue or other face covering to avoid spreading germs.    Wash your hands and face often with soap and water.    Caregivers in these groups are at risk for severe illness due to COVID-19:  o People 65 years and older  o People who live in a nursing home or long-term care facility  o People with chronic disease (lung, heart, cancer, diabetes, kidney, liver, immunologic)  o People who have a weakened immune system, including those who:  - Are in cancer treatment  - Take medicine that weakens the immune system, such as corticosteroids  - Had a bone marrow or organ transplant  - Have an immune deficiency  - Have poorly controlled HIV or AIDS  - Are obese (body mass index of 40 or higher)  - Smoke regularly    Caregivers should wear gloves while washing dishes,  handling laundry and cleaning bedrooms and bathrooms.    Wash and dry laundry with special caution. Don't shake dirty laundry, and use the warmest water setting you can.    If you have a weakened immune system, ask your doctor about other actions you should take.    For more tips, go to www.cdc.gov/coronavirus/2019-ncov/downloads/10Things.pdf.    You should not go back to work until you meet the guidelines above for ending your home isolation. You don't need to be retested for COVID-19 before going back to work--studies show that you won't spread the virus if it's been at least 10 days since your symptoms started (or 20 days, if you have a weak immune system).    Employers: This document serves as formal notice of your employee's medical guidelines for going back to work. They must meet the above guidelines before going back to work in person.    How can I take care of myself?    1. Get lots of rest. Drink extra fluids (unless a doctor has told you not to).    2. Take Tylenol (acetaminophen) for fever or pain. If you have liver or kidney problems, ask your family doctor if it's okay to take Tylenol.     Take either:     650 mg (two 325 mg pills) every 4 to 6 hours, or     1,000 mg (two 500 mg pills) every 8 hours as needed.     Note: Don't take more than 3,000 mg in one day. Acetaminophen is found in many medicines (both prescribed and over-the-counter medicines). Read all labels to be sure you don't take too much.    For children, check the Tylenol bottle for the right dose (based on their age or weight).    3. If you have other health problems (like cancer, heart failure, an organ transplant or severe kidney disease): Call your specialty clinic if you don't feel better in the next 2 days.    4. Know when to call 911: Emergency warning signs include:    Trouble breathing or shortness of breath    Pain or pressure in the chest that doesn't go away    Feeling confused like you haven't felt before, or not being able  to wake up    Bluish-colored lips or face    5. Sign up for [employer]. We know it's scary to hear that you have COVID-19. We want to track your symptoms to make sure you're okay over the next 2 weeks. Please look for an email from [employer]--this is a free, online program that we'll use to keep in touch. To sign up, follow the link in the email. Learn more at [email or web address].    Where can I get more information?    [facility]: [email or web address].[facility].[email or web address]/covid19/    Coronavirus Basics: www.health.[facility].[place]./diseases/coronavirus/basics.html    What to Do If You're Sick: www.cdc.gov/coronavirus/2019-ncov/about/steps-when-sick.html    Ending Home Isolation: www.cdc.gov/coronavirus/2019-ncov/hcp/disposition-in-home-patients.html     Caring for Someone with COVID-19: www.cdc.gov/coronavirus/2019-ncov/if-you-are-sick/care-for-someone.html     [facility] clinical trials (COVID-19 research studies): clinicalaffairs.[facility]-clinical-trials     A Positive COVID-19 letter will be sent via [employer] or the mail. (Exception, no letters sent to Presurgerical/Preprocedure Patients)    Sandip Hercules Nurse Advisor 10/30/2020 5:04 PM

## 2021-01-14 DIAGNOSIS — F41.1 GENERALIZED ANXIETY DISORDER: ICD-10-CM

## 2021-01-14 RX ORDER — ESCITALOPRAM OXALATE 10 MG/1
10 TABLET ORAL DAILY
Qty: 30 TABLET | Refills: 0 | Status: SHIPPED | OUTPATIENT
Start: 2021-01-14 | End: 2021-02-23

## 2021-02-19 DIAGNOSIS — F41.1 GENERALIZED ANXIETY DISORDER: ICD-10-CM

## 2021-02-19 DIAGNOSIS — M94.261 CHONDROMALACIA OF RIGHT KNEE: ICD-10-CM

## 2021-02-23 RX ORDER — ESCITALOPRAM OXALATE 10 MG/1
TABLET ORAL
Qty: 30 TABLET | Refills: 0 | Status: SHIPPED | OUTPATIENT
Start: 2021-02-23 | End: 2021-03-15

## 2021-02-23 RX ORDER — DICLOFENAC SODIUM 75 MG/1
TABLET, DELAYED RELEASE ORAL
Qty: 60 TABLET | Refills: 0 | Status: SHIPPED | OUTPATIENT
Start: 2021-02-23 | End: 2021-03-16 | Stop reason: ALTCHOICE

## 2021-03-04 NOTE — PROGRESS NOTES
Assessment & Plan     Generalized anxiety disorder  Well controlled with medications without side effects.  - escitalopram (LEXAPRO) 10 MG tablet; Take 1 tablet (10 mg) by mouth daily    Screen for colon cancer  - [facility]([employer])    Encounter for screening mammogram for breast cancer  - MA SCREENING DIGITAL BILAT - Future  (s+30); Future    Return in about 7 weeks (around 5/3/2021) for Physical.    Aliya Dyson, RUBENS CNP  [facility] STACY Robbins is a 55 year old who presents for the following health issues     HPI       Anxiety Follow-up      How are you doing with your depression since your last visit? Has been up and down    Are you having other symptoms that might be associated with depression? No    Have you had a significant life event?  No     Are you feeling anxious or having panic attacks?   Yes:  Anxiety    Do you have any concerns with your use of alcohol or other drugs? No    Social History     Tobacco Use     Smoking status: Never Smoker     Smokeless tobacco: Never Used   Substance Use Topics     Alcohol use: Yes     Drug use: No     PHQ 5/21/2019 10/9/2019 12/3/2019   PHQ-9 Total Score 7 3 3   Q9: Thoughts of better off dead/self-harm past 2 weeks Not at all Not at all Not at all     DORYS-7 SCORE 6/20/2018 5/21/2019 7/2/2019   Total Score - - -   Total Score 4 5 3     Last PHQ-9 3/15/2021   1.  Little interest or pleasure in doing things 0   2.  Feeling down, depressed, or hopeless 0   3.  Trouble falling or staying asleep, or sleeping too much 1   4.  Feeling tired or having little energy 1   5.  Poor appetite or overeating 0   6.  Feeling bad about yourself 0   7.  Trouble concentrating 0   8.  Moving slowly or restless 1   Q9: Thoughts of better off dead/self-harm past 2 weeks 0   PHQ-9 Total Score 3   Difficulty at work, home, or with people Somewhat difficult     DORYS-7  3/15/2021   1. Feeling nervous, anxious, or on edge 0   2. Not being able to  stop or control worrying 0   3. Worrying too much about different things 0   4. Trouble relaxing 1   5. Being so restless that it is hard to sit still 1   6. Becoming easily annoyed or irritable 0   7. Feeling afraid, as if something awful might happen 0   DORYS-7 Total Score 2   If you checked any problems, how difficult have they made it for you to do your work, take care of things at home, or get along with other people? Somewhat difficult       Suicide Assessment Five-step Evaluation and Treatment (SAFE-T)    How many servings of fruits and vegetables do you eat daily?  0-1    On average, how many sweetened beverages do you drink each day (Examples: soda, juice, sweet tea, etc.  Do NOT count diet or artificially sweetened beverages)?   0    How many days per week do you exercise enough to make your heart beat faster? 3 or less    How many minutes a day do you exercise enough to make your heart beat faster? 9 or less    How many days per week do you miss taking your medication? 0    ACT Total Scores 8/21/2019 10/9/2019 3/15/2021   ACT TOTAL SCORE - - -   ASTHMA ER VISITS - - -   ASTHMA HOSPITALIZATIONS - - -   ACT TOTAL SCORE (Goal Greater than or Equal to 20) 21 22 21   In the past 12 months, how many times did you visit the emergency room for your asthma without being admitted to the hospital? 0 0 0   In the past 12 months, how many times were you hospitalized overnight because of your asthma? 0 0 0       Review of Systems   Constitutional, HEENT, cardiovascular, pulmonary, gi and gu systems are negative, except as otherwise noted.      Objective    /80 (BP Location: Right arm, Patient Position: Chair, Cuff Size: Adult Large)   Pulse 86   Temp 97.8  F (36.6  C) (Oral)   Wt 94.3 kg (208 lb)   SpO2 98%   BMI 32.58 kg/m    Body mass index is 32.58 kg/m .  Physical Exam   GENERAL: healthy, alert and no distress  EYES: Eyes grossly normal to inspection, PERRL and conjunctivae and sclerae normal  PSYCH:  mentation appears normal, affect normal/bright

## 2021-03-15 ENCOUNTER — OFFICE VISIT (OUTPATIENT)
Dept: FAMILY MEDICINE | Facility: CLINIC | Age: 56
End: 2021-03-15

## 2021-03-15 VITALS
DIASTOLIC BLOOD PRESSURE: 80 MMHG | TEMPERATURE: 97.8 F | BODY MASS INDEX: 32.58 KG/M2 | OXYGEN SATURATION: 98 % | SYSTOLIC BLOOD PRESSURE: 130 MMHG | WEIGHT: 208 LBS | HEART RATE: 86 BPM

## 2021-03-15 DIAGNOSIS — Z12.31 ENCOUNTER FOR SCREENING MAMMOGRAM FOR BREAST CANCER: ICD-10-CM

## 2021-03-15 DIAGNOSIS — F41.1 GENERALIZED ANXIETY DISORDER: Primary | ICD-10-CM

## 2021-03-15 DIAGNOSIS — Z12.11 SCREEN FOR COLON CANCER: ICD-10-CM

## 2021-03-15 PROCEDURE — 99213 OFFICE O/P EST LOW 20 MIN: CPT | Performed by: NURSE PRACTITIONER

## 2021-03-15 RX ORDER — ESCITALOPRAM OXALATE 10 MG/1
10 TABLET ORAL DAILY
Qty: 90 TABLET | Refills: 1 | Status: SHIPPED | OUTPATIENT
Start: 2021-03-15 | End: 2021-10-12

## 2021-03-15 ASSESSMENT — PATIENT HEALTH QUESTIONNAIRE - PHQ9
5. POOR APPETITE OR OVEREATING: SEVERAL DAYS
5. POOR APPETITE OR OVEREATING: SEVERAL DAYS
SUM OF ALL RESPONSES TO PHQ QUESTIONS 1-9: 3

## 2021-03-15 ASSESSMENT — ANXIETY QUESTIONNAIRES
3. WORRYING TOO MUCH ABOUT DIFFERENT THINGS: NOT AT ALL
1. FEELING NERVOUS, ANXIOUS, OR ON EDGE: NOT AT ALL
7. FEELING AFRAID AS IF SOMETHING AWFUL MIGHT HAPPEN: NOT AT ALL
6. BECOMING EASILY ANNOYED OR IRRITABLE: NOT AT ALL
6. BECOMING EASILY ANNOYED OR IRRITABLE: NOT AT ALL
3. WORRYING TOO MUCH ABOUT DIFFERENT THINGS: NOT AT ALL
IF YOU CHECKED OFF ANY PROBLEMS ON THIS QUESTIONNAIRE, HOW DIFFICULT HAVE THESE PROBLEMS MADE IT FOR YOU TO DO YOUR WORK, TAKE CARE OF THINGS AT HOME, OR GET ALONG WITH OTHER PEOPLE: SOMEWHAT DIFFICULT
1. FEELING NERVOUS, ANXIOUS, OR ON EDGE: NOT AT ALL
GAD7 TOTAL SCORE: 2
IF YOU CHECKED OFF ANY PROBLEMS ON THIS QUESTIONNAIRE, HOW DIFFICULT HAVE THESE PROBLEMS MADE IT FOR YOU TO DO YOUR WORK, TAKE CARE OF THINGS AT HOME, OR GET ALONG WITH OTHER PEOPLE: SOMEWHAT DIFFICULT
5. BEING SO RESTLESS THAT IT IS HARD TO SIT STILL: SEVERAL DAYS
2. NOT BEING ABLE TO STOP OR CONTROL WORRYING: NOT AT ALL
GAD7 TOTAL SCORE: 2
5. BEING SO RESTLESS THAT IT IS HARD TO SIT STILL: SEVERAL DAYS
2. NOT BEING ABLE TO STOP OR CONTROL WORRYING: NOT AT ALL
7. FEELING AFRAID AS IF SOMETHING AWFUL MIGHT HAPPEN: NOT AT ALL

## 2021-03-16 ENCOUNTER — OFFICE VISIT (OUTPATIENT)
Dept: FAMILY MEDICINE | Facility: CLINIC | Age: 56
End: 2021-03-16

## 2021-03-16 VITALS
DIASTOLIC BLOOD PRESSURE: 82 MMHG | SYSTOLIC BLOOD PRESSURE: 126 MMHG | WEIGHT: 211 LBS | HEART RATE: 74 BPM | BODY MASS INDEX: 31.98 KG/M2 | TEMPERATURE: 97.8 F | HEIGHT: 68 IN | OXYGEN SATURATION: 98 %

## 2021-03-16 DIAGNOSIS — M17.11 PRIMARY OSTEOARTHRITIS OF RIGHT KNEE: Primary | ICD-10-CM

## 2021-03-16 PROCEDURE — 99213 OFFICE O/P EST LOW 20 MIN: CPT | Performed by: NURSE PRACTITIONER

## 2021-03-16 RX ORDER — CAPSAICIN 0.75 MG/G
CREAM TOPICAL
Qty: 57 G | Refills: 11 | Status: SHIPPED | OUTPATIENT
Start: 2021-03-16 | End: 2023-06-30

## 2021-03-16 RX ORDER — CELECOXIB 100 MG/1
100 CAPSULE ORAL 2 TIMES DAILY
Qty: 180 CAPSULE | Refills: 1 | Status: SHIPPED | OUTPATIENT
Start: 2021-03-16 | End: 2021-06-30

## 2021-03-16 ASSESSMENT — MIFFLIN-ST. JEOR: SCORE: 1602.65

## 2021-03-16 ASSESSMENT — ANXIETY QUESTIONNAIRES: GAD7 TOTAL SCORE: 2

## 2021-03-16 ASSESSMENT — PATIENT HEALTH QUESTIONNAIRE - PHQ9: SUM OF ALL RESPONSES TO PHQ QUESTIONS 1-9: 3

## 2021-03-16 ASSESSMENT — ASTHMA QUESTIONNAIRES: ACT_TOTALSCORE: 21

## 2021-03-16 NOTE — PATIENT INSTRUCTIONS
Patient Education     Osteoarthritis  Osteoarthritis happens when the cartilage in a joint becomes damaged and worn. This may be from age, wear and tear, overuse of the joint, obesity, or other problems. Osteoarthritis can affect any joint. But it's most common in hands, knees, spine, hips, and feet. Symptoms include joint stiffness, and pain. It's also called degenerative joint disease.   Home care    When a joint is more sore than usual, rest it for a day or two.    Heat can help relieve stiffness. Take a hot bath or apply a heating pad for up to 30 minutes at a time. If symptoms are worse in the morning, using heat just after awakening can help relax the muscle and soothe the joints.     Ice helps relieve pain. It's often used after activity. Use a cold pack wrapped in a thin cloth on the joint for 10 to 15 minutes at a time.     Alternating hot and cold can also help relieve pain. Try this for 20 minutes at a time, several times per day.    Exercise helps prevent the muscles and ligaments around the joint from becoming weak. It also helps maintain function in the joint. Be as active as you can. Talk to your healthcare provider about what activity program is best for you.    Excess weight puts a lot of extra strain on weight-bearing joints of the lower back, hips, knees, feet and ankles. If you are overweight, talk to your healthcare provider about a safe and effective weight loss program.    Use anti-inflammatory medicines as prescribed for pain. This includes acetaminophen. If needed, topical or injected medicines may be recommended. Talk with your healthcare provider if these options are not enough to manage your pain. Follow the directions on all over-the-counter medicines.    Talk with your healthcare provider about devices that might help improve your function and reduce pain.    Talk with you healthcare provider about physical therapy to help strengthen your joints and the surrounding  muscles.    Follow-up care  Follow up with your healthcare provider, or as advised.   When to seek medical advice  Call your healthcare provider right away if any of these occur:    Redness or swelling of a painful joint    Discharge or pus from a painful joint    Fever of 100.4 F (38 C) or higher, or as directed by your healthcare provider    Worsening joint pain    Decreased ability to move the joint or bear weight on the joint  [employer] last reviewed this educational content on 8/1/2019 2000-2020 [employer]. All rights reserved. This information is not intended as a substitute for professional medical care. Always follow your healthcare professional's instructions.

## 2021-03-16 NOTE — PROGRESS NOTES
"    Assessment & Plan     Primary osteoarthritis of right knee  Counseled on self-care measures including: heat, rest when pain flares, regular exercise, weight loss, OTC acetaminophen arthritis; and warning signs of when to seek urgent medical care including: worsening symptoms.  - capsaicin (ZOSTRIX) 0.075 % cream; Apply a thin film topically to affected areas up to 4 times daily as needed for pain.  - celecoxib (CELEBREX) 100 MG capsule; Take 1 capsule (100 mg) by mouth 2 times daily     BMI:   Estimated body mass index is 32.56 kg/m  as calculated from the following:    Height as of this encounter: 1.715 m (5' 7.5\").    Weight as of this encounter: 95.7 kg (211 lb).   Weight management plan: Discussed healthy diet and exercise guidelines    Return in about 4 weeks (around 4/13/2021), or if symptoms worsen or fail to improve.    Aliya Dyson, RUBENS CNP  [facility] STACY Robbins is a 55 year old who presents for the following health issues     HPI     Musculoskeletal problem/pain  Onset/Duration: 2 years  Description  Location: knee - right  Joint Swelling: no  Redness: no  Pain: YES  Warmth: no  Intensity:  moderate, severe  Progression of Symptoms:  worsening  Accompanying signs and symptoms:   Fevers: no  Numbness/tingling/weakness: no  History  Trauma to the area: no  Recent illness:  no  Previous similar problem: YES  Previous evaluation:  YES, xray and MRI  Precipitating or alleviating factors:  Aggravating factors include: sitting, standing, walking and climbing stairs  Therapies tried and outcome: support wrap and deep heating rub, PO diclofenac twice daily for the past couple of years was helping more at first but is not helping as much anymore     Review of Systems   Constitutional, HEENT, cardiovascular, pulmonary, gi and gu systems are negative, except as otherwise noted.      Objective    /82   Pulse 74   Temp 97.8  F (36.6  C) (Oral)   Ht 1.715 m (5' " "7.5\")   Wt 95.7 kg (211 lb)   SpO2 98%   Breastfeeding No   BMI 32.56 kg/m    Body mass index is 32.56 kg/m .  Physical Exam   GENERAL: healthy, alert and no distress  EYES: Eyes grossly normal to inspection, PERRL and conjunctivae and sclerae normal  MS: no gross musculoskeletal defects noted, no edema  SKIN: no suspicious lesions or rashes  NEURO: Normal strength and tone, mentation intact and speech normal  PSYCH: mentation appears normal, affect normal/bright          "

## 2021-03-17 ASSESSMENT — ASTHMA QUESTIONNAIRES: ACT_TOTALSCORE: 23

## 2021-03-31 ENCOUNTER — TRANSFERRED RECORDS (OUTPATIENT)
Dept: HEALTH INFORMATION MANAGEMENT | Facility: CLINIC | Age: 56
End: 2021-03-31

## 2021-04-01 DIAGNOSIS — J45.30 MILD PERSISTENT ASTHMA WITHOUT COMPLICATION: ICD-10-CM

## 2021-04-01 DIAGNOSIS — J30.2 SEASONAL ALLERGIC RHINITIS: ICD-10-CM

## 2021-04-03 RX ORDER — BUDESONIDE AND FORMOTEROL FUMARATE DIHYDRATE 160; 4.5 UG/1; UG/1
AEROSOL RESPIRATORY (INHALATION)
Qty: 30.6 G | Refills: 1 | Status: SHIPPED | OUTPATIENT
Start: 2021-04-03 | End: 2021-06-30

## 2021-04-03 RX ORDER — FLUTICASONE PROPIONATE 50 MCG
SPRAY, SUSPENSION (ML) NASAL
Qty: 48 G | Refills: 1 | Status: SHIPPED | OUTPATIENT
Start: 2021-04-03 | End: 2021-06-30

## 2021-04-05 ENCOUNTER — TRANSFERRED RECORDS (OUTPATIENT)
Dept: HEALTH INFORMATION MANAGEMENT | Facility: CLINIC | Age: 56
End: 2021-04-05

## 2021-04-07 ENCOUNTER — TRANSFERRED RECORDS (OUTPATIENT)
Dept: HEALTH INFORMATION MANAGEMENT | Facility: CLINIC | Age: 56
End: 2021-04-07

## 2021-04-14 ENCOUNTER — THERAPY VISIT (OUTPATIENT)
Dept: PHYSICAL THERAPY | Facility: CLINIC | Age: 56
End: 2021-04-14
Payer: COMMERCIAL

## 2021-04-14 DIAGNOSIS — S83.249A TEAR OF MEDIAL MENISCUS OF KNEE: Primary | ICD-10-CM

## 2021-04-14 PROCEDURE — 97530 THERAPEUTIC ACTIVITIES: CPT | Mod: GP | Performed by: PHYSICAL THERAPIST

## 2021-04-14 PROCEDURE — 97110 THERAPEUTIC EXERCISES: CPT | Mod: GP | Performed by: PHYSICAL THERAPIST

## 2021-04-14 PROCEDURE — 97161 PT EVAL LOW COMPLEX 20 MIN: CPT | Mod: GP | Performed by: PHYSICAL THERAPIST

## 2021-04-14 NOTE — PROGRESS NOTES
Physical Therapy Initial Evaluation  Subjective:    Patient Health History           General health as reported by patient is good.  Pertinent medical history includes: asthma, depression and osteoarthritis.   Red flags:  None as reported by patient.  Medical allergies: none.   Surgeries include:  None.    Current medications:  Anti-depressants.    Current occupation is production.   Primary job tasks include:  Lifting/carrying and prolonged standing.                                    Objective:  System    Physical Exam    General     ROS    Assessment/Plan:

## 2021-04-14 NOTE — LETTER
MIREYA [facility]  [address]  [address]  [place] [address]  [phone]    April 15, 2021    Re: Itzel Zacarias   :   1965  MRN:  [id number]   REFERRING PHYSICIAN:   MARIAMA Brantley [facility]  Date of Initial Evaluation:  2021  Visits:  Rxs Used: 1  Reason for Referral:  Tear of medial meniscus of knee    EVALUATION SUMMARY    Physical Therapy Initial Evaluation  Subjective:  Therapist Generated HPI Evaluation  Problem details: Patient reports the onset of right knee pain in 2021. She is unable to explain why the pain started. She does report an exacerbation of knee pain 2 weeks ago when she was walking stairs and her leg buckled and popped. She went to the ER that day for imaging and then sent her home with a recommendation to follow up with orthopedics.    Patient reports significant difficulty with her job responsibilities due to right knee pain and she is having difficulty sleeping at night due to the knee pain.    She reports having an intraarticular steroid injection 2 weeks ago which was not helpful.    Type of problem:  Right knee.    This is a new condition.  Condition occurred with:  Insidious onset.  Where condition occurred: at home.  Patient reports pain:  Medial.  Pain is described as aching and sharp and is constant.  Pain radiates to:  Knee. Pain is worse during the day.  Since onset symptoms are gradually improving.  Associated symptoms:  Buckling/giving out, loss of strength and loss of motion/stiffness. Symptoms are exacerbated by descending stairs, ascending stairs, bending/squatting, standing, walking and weight bearing    Special tests included:  MRI (see epic).  Past treatment: none.   Restrictions due to condition include:  Working in normal job without restrictions.  Barriers include:  None as reported by patient.    Patient Health History  General health as reported by  patient is good.  Pertinent medical history includes: asthma, depression and osteoarthritis.   Red flags:  None as reported by patient.  Re: Itzel Zacarias   :   1965    Medical allergies: none.   Surgeries include:  None.    Current medications:  Anti-depressants.    Current occupation is production.   Primary job tasks include:  Lifting/carrying and prolonged standing.               Objective:  Gait:    Gait Type:  Antalgic   Assistive Devices:  None  Deviations:  Knee:  Knee extension decr R and knee flexion decr RGeneral Deviations:  Stride length decr and stance time decr       Knee Evaluation:  ROM:    AROM  Extension: Left:    Right:  9 deg from straight  Flexion: Left:   Right: 108 deg  Pain: reported at end range right knee flexion and extension    Strength:   Extension:  Left: 4+/5   Pain:      Right: 4+/5   Strong/painful  Pain:  Flexion:  Left: 4+/5   Pain:      Right: 4+/5   Strong/painful  Pain:    Quad Set Left: Good and fair    Pain:   Quad Set Right: Fair    Pain:    Ligament Testing:  Normal    Special Tests:   Left knee negative for the following special tests:  Meninscal and Patellar compression  Right knee positive for the following tests:  Meniscal and Patellar Compression    Palpation:    Right knee tenderness present at:  Medial Joint Line    Edema:  Edema of the knee: moderate swelling over the anteriomedial right knee.    Mobility Testing:    Patellofemoral Superior:  Right: hypomobile  Patellofemoral Inferior:  Right: hypomobile                          Re: Itzel Zacarias   :   1965    Knee  Brace Trial:     Pain Rating    Affected Joint: Right Knee    Without  brace:    At rest 7/10  Walkin/10    Squattin/10   Stairs: 8/10      Wearing  brace:    At rest 5/10  Walkin/10    Squattin/10   Stairs: 4/10      Brace and Measurements:    Brace trialed:     Type - Ossur  One X  Size - Large  Side - Medial  Affected Knee -  Right Knee    Circumference 6 inches below mid patella: 16.75 inches    Other Comments:       Assessment/Plan:    Patient is a 55 year old female with right side knee complaints.    Patient has the following significant findings with corresponding treatment plan.                Diagnosis 1:  Right medial meniscus tear, patellofemoral pain syndrome  Pain -  hot/cold therapy, manual therapy, splint/taping/bracing/orthotics, self management, education and home program  Decreased ROM/flexibility - manual therapy, therapeutic exercise, therapeutic activity and home program  Decreased joint mobility - manual therapy, therapeutic exercise, therapeutic activity and home program  Decreased strength - therapeutic exercise, therapeutic activities and home program  Inflammation - cold therapy and self management/home program  Re: Itzel Zacarias   :   1965    Impaired gait - home program  Impaired muscle performance - neuro re-education and home program  Decreased function - therapeutic activities and home program  Impaired posture - neuro re-education, therapeutic activities and home program    Therapy Evaluation Codes:   1) History comprised of:   Personal factors that impact the plan of care:      Profession.    Comorbidity factors that impact the plan of care are:      None.     Medications impacting care: None.  2) Examination of Body Systems comprised of:   Body structures and functions that impact the plan of care:      Knee.   Activity limitations that impact the plan of care are:      Bathing, Bending, Cooking, Dressing, Jumping, Lifting, Running, Squatting/kneeling, Stairs, Standing, Walking, Working and Sleeping.  3) Clinical presentation characteristics are:   Stable/Uncomplicated.  4) Decision-Making    Low complexity using standardized patient assessment instrument and/or measureable assessment of functional outcome.  Cumulative Therapy Evaluation is: Low complexity.    Previous and current functional  limitations:  (See Goal Flow Sheet for this information)    Short term and Long term goals: (See Goal Flow Sheet for this information)     Communication ability:  Patient appears to be able to clearly communicate and understand verbal and written communication and follow directions correctly.  Treatment Explanation - The following has been discussed with the patient:   RX ordered/plan of care  Anticipated outcomes  Possible risks and side effects  This patient would benefit from PT intervention to resume normal activities.   Rehab potential is good.    Frequency:  1 X week, once daily  Duration:  for 6 weeks  Discharge Plan:  Achieve all LTG.  Independent in home treatment program.  Reach maximal therapeutic benefit.    Please refer to the daily flowsheet for treatment today, total treatment time and time spent performing 1:1 timed codes.               Re: Itzel Zacarias   :   1965    Thank you for your referral.    INQUIRIES  Therapist: Rodolfo Baugh, PT, DPT   [address] [address]  Phone: [phone]  Fax: [phone]

## 2021-04-14 NOTE — PROGRESS NOTES
Physical Therapy Initial Evaluation  Subjective:    Therapist Generated HPI Evaluation  Problem details: Patient reports the onset of right knee pain in March 2021. She is unable to explain why the pain started. She does report an exacerbation of knee pain 2 weeks ago when she was walking stairs and her leg buckled and popped. She went to the ER that day for imaging and then sent her home with a recommendation to follow up with orthopedics.    Patient reports significant difficulty with her job responsibilities due to right knee pain and she is having difficulty sleeping at night due to the knee pain.    She reports having an intraarticular steroid injection 2 weeks ago which was not helpful.         Type of problem:  Right knee.    This is a new condition.  Condition occurred with:  Insidious onset.  Where condition occurred: at home.  Patient reports pain:  Medial.  Pain is described as aching and sharp and is constant.  Pain radiates to:  Knee. Pain is worse during the day.  Since onset symptoms are gradually improving.  Associated symptoms:  Buckling/giving out, loss of strength and loss of motion/stiffness. Symptoms are exacerbated by descending stairs, ascending stairs, bending/squatting, standing, walking and weight bearing    Special tests included:  MRI (see epic).  Past treatment: none.   Restrictions due to condition include:  Working in normal job without restrictions.  Barriers include:  None as reported by patient.                        Objective:    Gait:    Gait Type:  Antalgic   Assistive Devices:  None  Deviations:  Knee:  Knee extension decr R and knee flexion decr RGeneral Deviations:  Stride length decr and stance time decr                                                      Knee Evaluation:  ROM:    AROM      Extension: Left:    Right:  9 deg from straight  Flexion: Left:   Right: 108 deg    Pain: reported at end range right knee flexion and extension    Strength:     Extension:  Left: 4+/5    Pain:      Right: 4+/5   Strong/painful  Pain:  Flexion:  Left: 4+/5   Pain:      Right: 4+/5   Strong/painful  Pain:    Quad Set Left: Good and fair    Pain:   Quad Set Right: Fair    Pain:  Ligament Testing:  Normal                Special Tests:     Left knee negative for the following special tests:  Meninscal and Patellar compression  Right knee positive for the following tests:  Meniscal and Patellar Compression  Palpation:      Right knee tenderness present at:  Medial Joint Line  Edema:  Edema of the knee: moderate swelling over the anteriomedial right knee.    Mobility Testing:          Patellofemoral Superior:  Right: hypomobile  Patellofemoral Inferior:  Right: hypomobile        General     ROS  Knee  Brace Trial:     Pain Rating    Affected Joint: Right Knee    Without  brace:    At rest 7/10  Walkin/10    Squattin/10   Stairs: 8/10      Wearing  brace:    At rest 5/10  Walkin/10    Squattin/10   Stairs: 4/10      Brace and Measurements:    Brace trialed:     Type - Ossur  One X  Size - Large  Side - Medial  Affected Knee - Right Knee    Circumference 6 inches below mid patella: 16.75 inches    Other Comments:       Assessment/Plan:    Patient is a 55 year old female with right side knee complaints.    Patient has the following significant findings with corresponding treatment plan.                Diagnosis 1:  Right medial meniscus tear, patellofemoral pain syndrome  Pain -  hot/cold therapy, manual therapy, splint/taping/bracing/orthotics, self management, education and home program  Decreased ROM/flexibility - manual therapy, therapeutic exercise, therapeutic activity and home program  Decreased joint mobility - manual therapy, therapeutic exercise, therapeutic activity and home program  Decreased strength - therapeutic exercise, therapeutic activities and home program  Inflammation - cold therapy and self management/home program  Impaired gait - home  program  Impaired muscle performance - neuro re-education and home program  Decreased function - therapeutic activities and home program  Impaired posture - neuro re-education, therapeutic activities and home program    Therapy Evaluation Codes:   1) History comprised of:   Personal factors that impact the plan of care:      Profession.    Comorbidity factors that impact the plan of care are:      None.     Medications impacting care: None.  2) Examination of Body Systems comprised of:   Body structures and functions that impact the plan of care:      Knee.   Activity limitations that impact the plan of care are:      Bathing, Bending, Cooking, Dressing, Jumping, Lifting, Running, Squatting/kneeling, Stairs, Standing, Walking, Working and Sleeping.  3) Clinical presentation characteristics are:   Stable/Uncomplicated.  4) Decision-Making    Low complexity using standardized patient assessment instrument and/or measureable assessment of functional outcome.  Cumulative Therapy Evaluation is: Low complexity.    Previous and current functional limitations:  (See Goal Flow Sheet for this information)    Short term and Long term goals: (See Goal Flow Sheet for this information)     Communication ability:  Patient appears to be able to clearly communicate and understand verbal and written communication and follow directions correctly.  Treatment Explanation - The following has been discussed with the patient:   RX ordered/plan of care  Anticipated outcomes  Possible risks and side effects  This patient would benefit from PT intervention to resume normal activities.   Rehab potential is good.    Frequency:  1 X week, once daily  Duration:  for 6 weeks  Discharge Plan:  Achieve all LTG.  Independent in home treatment program.  Reach maximal therapeutic benefit.    Please refer to the daily flowsheet for treatment today, total treatment time and time spent performing 1:1 timed codes.

## 2021-04-21 ENCOUNTER — THERAPY VISIT (OUTPATIENT)
Dept: PHYSICAL THERAPY | Facility: CLINIC | Age: 56
End: 2021-04-21
Payer: COMMERCIAL

## 2021-04-21 DIAGNOSIS — S83.249A TEAR OF MEDIAL MENISCUS OF KNEE: Primary | ICD-10-CM

## 2021-04-21 PROCEDURE — 97110 THERAPEUTIC EXERCISES: CPT | Mod: GP | Performed by: PHYSICAL THERAPIST

## 2021-04-21 NOTE — LETTER
MIREYA [facility]  [address]  [address]  [place] [address]  [phone]    2021    Re: Itzel Zacarias   :   1965  MRN:  [id number]   REFERRING PHYSICIAN:   MARIAMA Brantley [facility]  Date of Initial Evaluation: 2021   Visits:  Rxs Used: 2  Reason for Referral:  Tear of medial meniscus of knee    EVALUATION SUMMARY    PROGRESS  REPORT  Progress reporting period is from 2021 to 2021.       SUBJECTIVE  Patient reports improvments in AROM, but is having continued difficulty with her knee pain while standing. She reports being unable to stand throughout her shift at work.    Current pain level is  Current Pain level: 3/10.     Previous pain level was   Initial Pain level: 4/10.   Changes in function:  Yes (See Goal flowsheet attached for changes in current functional level)  Adverse reaction to treatment or activity: None    OBJECTIVE  Changes noted in objective findings:  Yes,   Objective: AROM 0-6-130 deg. fair quad set. Patient is scheduled to be fitted for an  knee orthotic on . In the meantime, Itzel would benefit from a sit/stand station at her workspace to use throughout the day to help her manage her knee pain and reduce missed time at work.     ASSESSMENT/PLAN  Updated problem list and treatment plan: Diagnosis 1:  Right knee pain  Pain -  hot/cold therapy, manual therapy, splint/taping/bracing/orthotics, self management, education and home program  Decreased ROM/flexibility - manual therapy, therapeutic exercise, therapeutic activity and home program  Decreased joint mobility - manual therapy, therapeutic exercise, therapeutic activity and home program  Decreased strength - therapeutic exercise, therapeutic activities and home program  Impaired gait - home program  Decreased function - therapeutic activities and home program  STG/LTGs have been met or  progress has been made towards goals:  Yes (See Goal flow sheet completed today.)  Assessment of Progress: The patient's condition is improving.  Re: Itzel Zacarias   :   1965    Self Management Plans:  Patient has been instructed in a home treatment program.  Patient  has been instructed in self management of symptoms.  I have re-evaluated this patient and find that the nature, scope, duration and intensity of the therapy is appropriate for the medical condition of the patient.  Itzel continues to require the following intervention to meet STG and LTG's:  PT    Recommendations:  This patient would benefit from continued therapy.     Frequency:  1 X week, once daily  Duration:  for 6-8 weeks      Please refer to the daily flowsheet for treatment today, total treatment time and time spent performing 1:1 timed codes.        Thank you for your referral.    INQUIRIES  Therapist: Rodolfo Baugh, PT DPT   [facility]  [address] [place] [address]  Phone: [phone]  Fax: [phone]

## 2021-04-21 NOTE — PROGRESS NOTES
Subjective:  HPI  Physical Exam                    Objective:  System    Physical Exam    General     ROS    Assessment/Plan:    PROGRESS  REPORT    Progress reporting period is from 4/14/2021 to 4/21/2021.       SUBJECTIVE  Patient reports improvments in AROM, but is having continued difficulty with her knee pain while standing. She reports being unable to stand throughout her shift at work.    Current pain level is  Current Pain level: 3/10.     Previous pain level was   Initial Pain level: 4/10.   Changes in function:  Yes (See Goal flowsheet attached for changes in current functional level)  Adverse reaction to treatment or activity: None    OBJECTIVE  Changes noted in objective findings:  Yes,   Objective: AROM 0-6-130 deg. fair quad set. Patient is scheduled to be fitted for an  knee orthotic on April 29th. In the meantime, Itzel would benefit from a sit/stand station at her workspace to use throughout the day to help her manage her knee pain and reduce missed time at work.     ASSESSMENT/PLAN  Updated problem list and treatment plan: Diagnosis 1:  Right knee pain  Pain -  hot/cold therapy, manual therapy, splint/taping/bracing/orthotics, self management, education and home program  Decreased ROM/flexibility - manual therapy, therapeutic exercise, therapeutic activity and home program  Decreased joint mobility - manual therapy, therapeutic exercise, therapeutic activity and home program  Decreased strength - therapeutic exercise, therapeutic activities and home program  Impaired gait - home program  Decreased function - therapeutic activities and home program  STG/LTGs have been met or progress has been made towards goals:  Yes (See Goal flow sheet completed today.)  Assessment of Progress: The patient's condition is improving.  Self Management Plans:  Patient has been instructed in a home treatment program.  Patient  has been instructed in self management of symptoms.  I have re-evaluated this  patient and find that the nature, scope, duration and intensity of the therapy is appropriate for the medical condition of the patient.  Itzel continues to require the following intervention to meet STG and LTG's:  PT    Recommendations:  This patient would benefit from continued therapy.     Frequency:  1 X week, once daily  Duration:  for 6-8 weeks        Please refer to the daily flowsheet for treatment today, total treatment time and time spent performing 1:1 timed codes.

## 2021-04-28 ENCOUNTER — IMMUNIZATION (OUTPATIENT)
Dept: NURSING | Facility: CLINIC | Age: 56
End: 2021-04-28
Payer: COMMERCIAL

## 2021-04-28 PROCEDURE — 0001A PR COVID VAC PFIZER DIL RECON 30 MCG/0.3 ML IM: CPT

## 2021-04-28 PROCEDURE — 91300 PR COVID VAC PFIZER DIL RECON 30 MCG/0.3 ML IM: CPT

## 2021-05-06 ENCOUNTER — THERAPY VISIT (OUTPATIENT)
Dept: PHYSICAL THERAPY | Facility: CLINIC | Age: 56
End: 2021-05-06
Payer: COMMERCIAL

## 2021-05-06 DIAGNOSIS — S83.249A TEAR OF MEDIAL MENISCUS OF KNEE: Primary | ICD-10-CM

## 2021-05-06 PROCEDURE — 97530 THERAPEUTIC ACTIVITIES: CPT | Mod: GP | Performed by: PHYSICAL THERAPIST

## 2021-05-06 PROCEDURE — 97110 THERAPEUTIC EXERCISES: CPT | Mod: GP | Performed by: PHYSICAL THERAPIST

## 2021-05-14 ENCOUNTER — THERAPY VISIT (OUTPATIENT)
Dept: PHYSICAL THERAPY | Facility: CLINIC | Age: 56
End: 2021-05-14
Payer: COMMERCIAL

## 2021-05-14 DIAGNOSIS — S83.249A TEAR OF MEDIAL MENISCUS OF KNEE: Primary | ICD-10-CM

## 2021-05-14 PROCEDURE — 97110 THERAPEUTIC EXERCISES: CPT | Mod: GP | Performed by: PHYSICAL THERAPIST

## 2021-05-14 PROCEDURE — 97530 THERAPEUTIC ACTIVITIES: CPT | Mod: GP | Performed by: PHYSICAL THERAPIST

## 2021-05-19 ENCOUNTER — IMMUNIZATION (OUTPATIENT)
Dept: NURSING | Facility: CLINIC | Age: 56
End: 2021-05-19
Attending: FAMILY MEDICINE
Payer: COMMERCIAL

## 2021-05-19 PROCEDURE — 0002A PR COVID VAC PFIZER DIL RECON 30 MCG/0.3 ML IM: CPT

## 2021-05-19 PROCEDURE — 91300 PR COVID VAC PFIZER DIL RECON 30 MCG/0.3 ML IM: CPT

## 2021-05-20 ENCOUNTER — THERAPY VISIT (OUTPATIENT)
Dept: PHYSICAL THERAPY | Facility: CLINIC | Age: 56
End: 2021-05-20
Payer: COMMERCIAL

## 2021-05-20 DIAGNOSIS — S83.249A TEAR OF MEDIAL MENISCUS OF KNEE: Primary | ICD-10-CM

## 2021-05-20 PROCEDURE — 97110 THERAPEUTIC EXERCISES: CPT | Mod: GP | Performed by: PHYSICAL THERAPIST

## 2021-05-20 PROCEDURE — 97530 THERAPEUTIC ACTIVITIES: CPT | Mod: GP | Performed by: PHYSICAL THERAPIST

## 2021-05-27 ENCOUNTER — THERAPY VISIT (OUTPATIENT)
Dept: PHYSICAL THERAPY | Facility: CLINIC | Age: 56
End: 2021-05-27
Payer: COMMERCIAL

## 2021-05-27 DIAGNOSIS — S83.249A TEAR OF MEDIAL MENISCUS OF KNEE: Primary | ICD-10-CM

## 2021-05-27 PROCEDURE — 97530 THERAPEUTIC ACTIVITIES: CPT | Mod: GP | Performed by: PHYSICAL THERAPIST

## 2021-05-27 PROCEDURE — 97112 NEUROMUSCULAR REEDUCATION: CPT | Mod: GP | Performed by: PHYSICAL THERAPIST

## 2021-05-27 PROCEDURE — 97110 THERAPEUTIC EXERCISES: CPT | Mod: GP | Performed by: PHYSICAL THERAPIST

## 2021-05-27 NOTE — PROGRESS NOTES
Subjective:  HPI  Physical Exam                    Objective:  System    Physical Exam    General     ROS    Assessment/Plan:    DISCHARGE REPORT    Progress reporting period is from 4/14/2021 to 5/27/2021.       SUBJECTIVE  Subjective changes noted by patient:   Subjective: Patient reports her knee is feeling much better and she only experiences occasional quick sharp pain. She reports no pain while at work.  She reports full compliance with her HEP. She doesnt wear her  brace on the weekends.    Current pain level is  Current Pain level: 0/10.     Previous pain level was   Initial Pain level: 4/10.   Changes in function:  Yes (See Goal flowsheet attached for changes in current functional level)  Adverse reaction to treatment or activity: None    OBJECTIVE  Changes noted in objective findings:  Yes,       Right knee AROM WNL and painfree.   Right knee strength grossly 5/5  Good pelvifemoral stability with single squats  Patient continues to wear  knee brace on week days only.    ASSESSMENT/PLAN  Updated problem list and treatment plan: Diagnosis 1:  Right meniscus tear    STG/LTGs have been met or progress has been made towards goals:  Yes (See Goal flow sheet completed today.)  Assessment of Progress: The patient's condition is improving.  The patient has met all of their long term goals.  Self Management Plans:  Patient is independent in a home treatment program.  Patient is independent in self management of symptoms.  I have re-evaluated this patient and find that the nature, scope, duration and intensity of the therapy is appropriate for the medical condition of the patient.  Itzel continues to require the following intervention to meet STG and LTG's:  PT intervention is no longer required to meet STG/LTG.    Recommendations:  This patient is ready to be discharged from therapy and continue their home treatment program.    Please refer to the daily flowsheet for treatment today, total  treatment time and time spent performing 1:1 timed codes.

## 2021-06-30 ENCOUNTER — OFFICE VISIT (OUTPATIENT)
Dept: FAMILY MEDICINE | Facility: CLINIC | Age: 56
End: 2021-06-30
Payer: COMMERCIAL

## 2021-06-30 VITALS
HEART RATE: 81 BPM | DIASTOLIC BLOOD PRESSURE: 73 MMHG | OXYGEN SATURATION: 96 % | WEIGHT: 206 LBS | BODY MASS INDEX: 31.22 KG/M2 | SYSTOLIC BLOOD PRESSURE: 109 MMHG | TEMPERATURE: 97.9 F | HEIGHT: 68 IN

## 2021-06-30 DIAGNOSIS — J30.89 SEASONAL ALLERGIC RHINITIS DUE TO OTHER ALLERGIC TRIGGER: ICD-10-CM

## 2021-06-30 DIAGNOSIS — R60.9 DEPENDENT EDEMA: ICD-10-CM

## 2021-06-30 DIAGNOSIS — S83.241D TEAR OF MEDIAL MENISCUS OF RIGHT KNEE, CURRENT, UNSPECIFIED TEAR TYPE, SUBSEQUENT ENCOUNTER: Primary | ICD-10-CM

## 2021-06-30 DIAGNOSIS — G25.81 RESTLESS LEGS SYNDROME (RLS): ICD-10-CM

## 2021-06-30 DIAGNOSIS — D17.30 LIPOMA OF SKIN AND SUBCUTANEOUS TISSUE: ICD-10-CM

## 2021-06-30 DIAGNOSIS — Z11.59 NEED FOR HEPATITIS C SCREENING TEST: ICD-10-CM

## 2021-06-30 DIAGNOSIS — J45.30 MILD PERSISTENT ASTHMA WITHOUT COMPLICATION: ICD-10-CM

## 2021-06-30 DIAGNOSIS — M22.41 CHONDROMALACIA OF PATELLA, RIGHT: ICD-10-CM

## 2021-06-30 LAB
ALBUMIN SERPL-MCNC: 3.7 G/DL (ref 3.4–5)
ALP SERPL-CCNC: 77 U/L (ref 40–150)
ALT SERPL W P-5'-P-CCNC: 26 U/L (ref 0–50)
ANION GAP SERPL CALCULATED.3IONS-SCNC: 5 MMOL/L (ref 3–14)
AST SERPL W P-5'-P-CCNC: 14 U/L (ref 0–45)
BASOPHILS # BLD AUTO: 0 10E9/L (ref 0–0.2)
BASOPHILS NFR BLD AUTO: 0.5 %
BILIRUB SERPL-MCNC: 0.3 MG/DL (ref 0.2–1.3)
BUN SERPL-MCNC: 17 MG/DL (ref 7–30)
CALCIUM SERPL-MCNC: 8.6 MG/DL (ref 8.5–10.1)
CHLORIDE SERPL-SCNC: 108 MMOL/L (ref 94–109)
CO2 SERPL-SCNC: 26 MMOL/L (ref 20–32)
CREAT SERPL-MCNC: 0.68 MG/DL (ref 0.52–1.04)
DIFFERENTIAL METHOD BLD: NORMAL
EOSINOPHIL # BLD AUTO: 0.5 10E9/L (ref 0–0.7)
EOSINOPHIL NFR BLD AUTO: 8.5 %
ERYTHROCYTE [DISTWIDTH] IN BLOOD BY AUTOMATED COUNT: 13.5 % (ref 10–15)
FERRITIN SERPL-MCNC: 111 NG/ML (ref 8–252)
GFR SERPL CREATININE-BSD FRML MDRD: >90 ML/MIN/{1.73_M2}
GLUCOSE SERPL-MCNC: 93 MG/DL (ref 70–99)
HCT VFR BLD AUTO: 38.9 % (ref 35–47)
HCV AB SERPL QL IA: NONREACTIVE
HGB BLD-MCNC: 12.6 G/DL (ref 11.7–15.7)
LYMPHOCYTES # BLD AUTO: 1.1 10E9/L (ref 0.8–5.3)
LYMPHOCYTES NFR BLD AUTO: 18.3 %
MCH RBC QN AUTO: 30.8 PG (ref 26.5–33)
MCHC RBC AUTO-ENTMCNC: 32.4 G/DL (ref 31.5–36.5)
MCV RBC AUTO: 95 FL (ref 78–100)
MONOCYTES # BLD AUTO: 0.7 10E9/L (ref 0–1.3)
MONOCYTES NFR BLD AUTO: 11.4 %
NEUTROPHILS # BLD AUTO: 3.6 10E9/L (ref 1.6–8.3)
NEUTROPHILS NFR BLD AUTO: 61.3 %
PLATELET # BLD AUTO: 285 10E9/L (ref 150–450)
POTASSIUM SERPL-SCNC: 3.8 MMOL/L (ref 3.4–5.3)
PROT SERPL-MCNC: 7 G/DL (ref 6.8–8.8)
RBC # BLD AUTO: 4.09 10E12/L (ref 3.8–5.2)
SODIUM SERPL-SCNC: 139 MMOL/L (ref 133–144)
TSH SERPL DL<=0.005 MIU/L-ACNC: 0.49 MU/L (ref 0.4–4)
WBC # BLD AUTO: 5.9 10E9/L (ref 4–11)

## 2021-06-30 PROCEDURE — 90732 PPSV23 VACC 2 YRS+ SUBQ/IM: CPT | Performed by: FAMILY MEDICINE

## 2021-06-30 PROCEDURE — 80050 GENERAL HEALTH PANEL: CPT | Performed by: FAMILY MEDICINE

## 2021-06-30 PROCEDURE — 86803 HEPATITIS C AB TEST: CPT | Performed by: FAMILY MEDICINE

## 2021-06-30 PROCEDURE — 82728 ASSAY OF FERRITIN: CPT | Performed by: FAMILY MEDICINE

## 2021-06-30 PROCEDURE — 90471 IMMUNIZATION ADMIN: CPT | Performed by: FAMILY MEDICINE

## 2021-06-30 PROCEDURE — 36415 COLL VENOUS BLD VENIPUNCTURE: CPT | Performed by: FAMILY MEDICINE

## 2021-06-30 PROCEDURE — 99214 OFFICE O/P EST MOD 30 MIN: CPT | Mod: 25 | Performed by: FAMILY MEDICINE

## 2021-06-30 RX ORDER — BUDESONIDE AND FORMOTEROL FUMARATE DIHYDRATE 160; 4.5 UG/1; UG/1
AEROSOL RESPIRATORY (INHALATION)
Qty: 30.6 G | Refills: 3 | Status: SHIPPED | OUTPATIENT
Start: 2021-06-30 | End: 2022-10-18

## 2021-06-30 RX ORDER — ALBUTEROL SULFATE 0.83 MG/ML
SOLUTION RESPIRATORY (INHALATION)
Status: CANCELLED | OUTPATIENT
Start: 2021-06-30

## 2021-06-30 RX ORDER — ALBUTEROL SULFATE 90 UG/1
AEROSOL, METERED RESPIRATORY (INHALATION)
Qty: 18 G | Refills: 11 | Status: SHIPPED | OUTPATIENT
Start: 2021-06-30 | End: 2022-10-18

## 2021-06-30 RX ORDER — CETIRIZINE HYDROCHLORIDE 10 MG/1
10 TABLET ORAL DAILY
Qty: 90 TABLET | Refills: 3 | Status: SHIPPED | OUTPATIENT
Start: 2021-06-30 | End: 2022-07-10

## 2021-06-30 RX ORDER — ALBUTEROL SULFATE 0.83 MG/ML
SOLUTION RESPIRATORY (INHALATION)
Qty: 90 ML | Refills: 0 | Status: SHIPPED | OUTPATIENT
Start: 2021-06-30 | End: 2022-02-07

## 2021-06-30 RX ORDER — CELECOXIB 100 MG/1
100 CAPSULE ORAL 2 TIMES DAILY PRN
Qty: 180 CAPSULE | Refills: 3 | Status: SHIPPED | OUTPATIENT
Start: 2021-06-30 | End: 2022-10-18

## 2021-06-30 RX ORDER — FLUTICASONE PROPIONATE 50 MCG
SPRAY, SUSPENSION (ML) NASAL
Qty: 48 G | Refills: 3 | Status: SHIPPED | OUTPATIENT
Start: 2021-06-30 | End: 2022-08-12

## 2021-06-30 RX ORDER — MULTIPLE VITAMINS W/ MINERALS TAB 9MG-400MCG
1 TAB ORAL DAILY
COMMUNITY

## 2021-06-30 ASSESSMENT — ANXIETY QUESTIONNAIRES
GAD7 TOTAL SCORE: 1
7. FEELING AFRAID AS IF SOMETHING AWFUL MIGHT HAPPEN: NOT AT ALL
6. BECOMING EASILY ANNOYED OR IRRITABLE: NOT AT ALL
5. BEING SO RESTLESS THAT IT IS HARD TO SIT STILL: SEVERAL DAYS
3. WORRYING TOO MUCH ABOUT DIFFERENT THINGS: NOT AT ALL
IF YOU CHECKED OFF ANY PROBLEMS ON THIS QUESTIONNAIRE, HOW DIFFICULT HAVE THESE PROBLEMS MADE IT FOR YOU TO DO YOUR WORK, TAKE CARE OF THINGS AT HOME, OR GET ALONG WITH OTHER PEOPLE: NOT DIFFICULT AT ALL
2. NOT BEING ABLE TO STOP OR CONTROL WORRYING: NOT AT ALL
1. FEELING NERVOUS, ANXIOUS, OR ON EDGE: NOT AT ALL

## 2021-06-30 ASSESSMENT — PATIENT HEALTH QUESTIONNAIRE - PHQ9
5. POOR APPETITE OR OVEREATING: NOT AT ALL
SUM OF ALL RESPONSES TO PHQ QUESTIONS 1-9: 2

## 2021-06-30 ASSESSMENT — MIFFLIN-ST. JEOR: SCORE: 1579.97

## 2021-06-30 NOTE — LETTER
My Asthma Action Plan    Name: Itzel Zacarias   YOB: 1965  Date: 6/30/2021   My doctor: Anai Velazquez MD   My clinic: [facility]        My Control Medicine: Budesonide + formoterol (Symbicort HFA) -  160/4.5 mcg 2 puffs twice daily  My Rescue Medicine: Albuterol (Proair/Ventolin/Proventil HFA) 2-4 puffs EVERY 4 HOURS as needed. Use a spacer if recommended by your provider.   My Asthma Severity:   Mild Persistent  Know your asthma triggers: upper respiratory infections               GREEN ZONE   Good Control    I feel good    No cough or wheeze    Can work, sleep and play without asthma symptoms       Take your asthma control medicine every day.     1. If exercise triggers your asthma, take your rescue medication    15 minutes before exercise or sports, and    During exercise if you have asthma symptoms  2. Spacer to use with inhaler: If you have a spacer, make sure to use it with your inhaler             YELLOW ZONE Getting Worse  I have ANY of these:    I do not feel good    Cough or wheeze    Chest feels tight    Wake up at night   1. Keep taking your Green Zone medications  2. Start taking your rescue medicine:    every 20 minutes for up to 1 hour. Then every 4 hours for 24-48 hours.  3. If you stay in the Yellow Zone for more than 12-24 hours, contact your doctor.  4. If you do not return to the Green Zone in 12-24 hours or you get worse, start taking your oral steroid medicine if prescribed by your provider.           RED ZONE Medical Alert - Get Help  I have ANY of these:    I feel awful    Medicine is not helping    Breathing getting harder    Trouble walking or talking    Nose opens wide to breathe       1. Take your rescue medicine NOW  2. If your provider has prescribed an oral steroid medicine, start taking it NOW  3. Call your doctor NOW  4. If you are still in the Red Zone after 20 minutes and you have not reached your doctor:    Take your rescue medicine  again and    Call 911 or go to the emergency room right away    See your regular doctor within 2 weeks of an Emergency Room or Urgent Care visit for follow-up treatment.          Annual Reminders:  Meet with Asthma Educator,  Flu Shot in the Fall, consider Pneumonia Vaccination for patients with asthma (aged 19 and older).    Pharmacy:    [id number] - [place], [place] - [address] [place] [address] [place] - [address]    Electronically signed by Anai Velazquez MD   Date: 06/30/21                      Asthma Triggers  How To Control Things That Make Your Asthma Worse    Triggers are things that make your asthma worse.  Look at the list below to help you find your triggers and what you can do about them.  You can help prevent asthma flare-ups by staying away from your triggers.      Trigger                                                          What you can do   Cigarette Smoke  Tobacco smoke can make asthma worse. Do not allow smoking in your home, car or around you.  Be sure no one smokes at a child s day care or school.  If you smoke, ask your health care provider for ways to help you quit.  Ask family members to quit too.  Ask your health care provider for a referral to Quit Plan to help you quit smoking, or call 6-065-119-PLAN.     Colds, Flu, Bronchitis  These are common triggers of asthma. Wash your hands often.  Don t touch your eyes, nose or mouth.  Get a flu shot every year.     Dust Mites  These are tiny bugs that live in cloth or carpet. They are too small to see. Wash sheets and blankets in hot water every week.   Encase pillows and mattress in dust mite proof covers.  Avoid having carpet if you can. If you have carpet, vacuum weekly.   Use a dust mask and HEPA vacuum.   Pollen and Outdoor Mold  Some people are allergic to trees, grass, or weed pollen, or molds. Try to keep your windows closed.  Limit time out doors when pollen  count is high.   Ask you health care provider about taking medicine during allergy season.     Animal Dander  Some people are allergic to skin flakes, urine or saliva from pets with fur or feathers. Keep pets with fur or feathers out of your home.    If you can t keep the pet outdoors, then keep the pet out of your bedroom.  Keep the bedroom door closed.  Keep pets off cloth furniture and away from stuffed toys.     Mice, Rats, and Cockroaches   Some people are allergic to the waste from these pests.   Cover food and garbage.  Clean up spills and food crumbs.  Store grease in the refrigerator.   Keep food out of the bedroom.   Indoor Mold  This can be a trigger if your home has high moisture. Fix leaking faucets, pipes, or other sources of water.   Clean moldy surfaces.  Dehumidify basement if it is damp and smelly.   Smoke, Strong Odors, and Sprays  These can reduce air quality. Stay away from strong odors and sprays, such as perfume, powder, hair spray, paints, smoke incense, paint, cleaning products, candles and new carpet.   Exercise or Sports  Some people with asthma have this trigger. Be active!  Ask your doctor about taking medicine before sports or exercise to prevent symptoms.    Warm up for 5-10 minutes before and after sports or exercise.     Other Triggers of Asthma  Cold air:  Cover your nose and mouth with a scarf.  Sometimes laughing or crying can be a trigger.  Some medicines and food can trigger asthma.

## 2021-06-30 NOTE — PROGRESS NOTES
Assessment & Plan     Tear of medial meniscus of right knee, current, unspecified tear type, subsequent encounter  Chondromalacia of patella, right  -follow-up with orthopedic surgery - [facility]     Dependent edema  -likely due to above   -Lower extremity elevation, low-salt diet, and call or return to clinic as needed if these symptoms worsen or fail to improve as anticipated.   - Comprehensive metabolic panel  - CBC with platelets and differential  - TSH with free T4 reflex    Restless legs syndrome (RLS)  -new diagnosis   - CBC with platelets and differential  - Ferritin  -trial of MVI with iron daily  -Call or return to clinic as needed if these symptoms worsen or fail to improve for medication trial     Lipoma of skin and subcutaneous tissue  -The patient is reassured that these symptoms do not appear to represent a serious or threatening condition.   - GENERAL SURG ADULT REFERRAL; Future    Mild persistent asthma without complication  -Well controlled with medications without side effects.   - budesonide-formoterol (SYMBICORT) 160-4.5 MCG/ACT Inhaler; INHALE TWO PUFFS BY MOUTH TWICE A DAY  - albuterol (VENTOLIN HFA) 108 (90 Base) MCG/ACT inhaler; INHALE TWO PUFFS BY MOUTH EVERY 6 HOURS AS NEEDED  - albuterol (PROVENTIL) (2.5 MG/3ML) 0.083% neb solution; NEBULIZE CONTENTS OF ONE VIAL EVERY 6 HOURS AS NEEDED    Seasonal allergic rhinitis due to other allergic trigger  -Well controlled with medications without side effects.   - cetirizine (ZYRTEC) 10 MG tablet; Take 1 tablet (10 mg) by mouth daily  - fluticasone (FLONASE) 50 MCG/ACT nasal spray; USE TWO SPRAYS IN EACH NOSTRIL ONCE DAILY    Need for hepatitis C screening test  - Hepatitis C Screen Reflex to HCV RNA Quant and Genotype    Ordering of each unique test  Prescription drug management         See Patient Instructions    Return in about 6 months (around 12/30/2021) for asthma.    Anai Velazquez MD  [facility] [place]  "is a 55 year old who presents for the following health issues     HPI     Chief Complaint   Patient presents with     RECHECK     on lump on lower back     Edema     ankles , right leg       Itzel Zacarias is a 55 year old female who presents with right lower extremity edema, better in the morning and worse in the evening, painless, insidious in onset, worsening over months. She has accompanying chronic right knee pain despite physical therapy and outside evaluation with MRI; records are reviewed.     Asthma Follow-Up    Was ACT completed today?    Yes    ACT Total Scores 6/30/2021   ACT TOTAL SCORE -   ASTHMA ER VISITS -   ASTHMA HOSPITALIZATIONS -   ACT TOTAL SCORE (Goal Greater than or Equal to 20) 19   In the past 12 months, how many times did you visit the emergency room for your asthma without being admitted to the hospital? 0   In the past 12 months, how many times were you hospitalized overnight because of your asthma? 0         How many days per week do you miss taking your asthma controller medication?  0    Please describe any recent triggers for your asthma: allergies    Have you had any Emergency Room Visits, Urgent Care Visits, or Hospital Admissions since your last office visit?  No        Review of Systems   CONSTITUTIONAL: NEGATIVE for fever, chills, change in weight  INTEGUMENTARY/SKIN: petechia on right medial foot   RESP: NEGATIVE for significant cough or SOB  CV: POSITIVE for lower extremity edema  MUSCULOSKELETAL: right knee pain   NEURO: NEGATIVE for weakness, dizziness or paresthesias  HEME: NEGATIVE for bleeding problems      Objective    /73   Pulse 81   Temp 97.9  F (36.6  C) (Oral)   Ht 1.715 m (5' 7.5\")   Wt 93.4 kg (206 lb)   SpO2 96%   Breastfeeding No   BMI 31.79 kg/m    Body mass index is 31.79 kg/m .  Physical Exam   GENERAL: alert, no distress and obese  NECK: no adenopathy, no asymmetry, masses, or scars and thyroid normal to palpation  RESP: lungs clear to " auscultation - no rales, rhonchi or wheezes  CV: regular rates and rhythm, normal S1 S2, no S3 or S4 and 1+ right lower extremity pitting edema to knee    MS: wearing brace on right knee; normal ROM, stable, no pain with meniscal strain, right knee with lateral joint line tenderness   SKIN: petechia and venous stasis changes of right lower extremity   NEURO: Normal strength and tone, mentation intact and speech normal  PSYCH: mentation appears normal, affect normal/bright    Results for orders placed or performed in visit on 06/30/21 (from the past 24 hour(s))   CBC with platelets and differential   Result Value Ref Range    WBC 5.9 4.0 - 11.0 10e9/L    RBC Count 4.09 3.8 - 5.2 10e12/L    Hemoglobin 12.6 11.7 - 15.7 g/dL    Hematocrit 38.9 35.0 - 47.0 %    MCV 95 78 - 100 fl    MCH 30.8 26.5 - 33.0 pg    MCHC 32.4 31.5 - 36.5 g/dL    RDW 13.5 10.0 - 15.0 %    Platelet Count 285 150 - 450 10e9/L    Diff Method Automated Method     % Neutrophils 61.3 %    % Lymphocytes 18.3 %    % Monocytes 11.4 %    % Eosinophils 8.5 %    % Basophils 0.5 %    Absolute Neutrophil 3.6 1.6 - 8.3 10e9/L    Absolute Lymphocytes 1.1 0.8 - 5.3 10e9/L    Absolute Monocytes 0.7 0.0 - 1.3 10e9/L    Absolute Eosinophils 0.5 0.0 - 0.7 10e9/L    Absolute Basophils 0.0 0.0 - 0.2 10e9/L

## 2021-06-30 NOTE — PATIENT INSTRUCTIONS
Get the shingles vaccine, called Shingrix (given as 2 shots, 2 to 6 months apart), even if you have already had the Zostavax vaccine. Discuss getting the Shingix vaccine from your pharmacist, or schedule an ancillary shot visit here. Some insurances do not cover the cost of these vaccines.      Patient Education     Understanding a Lipoma     A lipoma is a lump under the skin that s made of fat. It s not cancer (benign). It feels soft like rubber when you press it, and in most cases it doesn t hurt. Some people have more than one. A lipoma grows slowly over time and doesn t cause many problems. Lipomas occur most often in adults from ages 40 to 60. They are more common in men.   How to say it  [id number]  What causes a lipoma?  Experts don't know yet what causes lipomas. They are still learning more. They may be partly caused by a problem in a gene. They can run in families. Familial multiple lipomatosis is when 2 or more family members have many lipomas.  Symptoms of a lipoma  The main symptom of a lipoma is a soft lump under the skin that doesn t hurt unless it is pressing on a nerve. It may be small, around 1/4 inch across. Or it may be larger, up to 4 inches across or more.  There are different kinds of lipomas. The most common kind occurs under the skin of the shoulders, chest, back, belly, or under the arms. In some cases, a lipoma can occur on the legs. In rare cases, one may occur deeper in the body or in a muscle.  Treatment for a lipoma  In most cases, a lipoma doesn t need treatment. Your healthcare provider may look at it during regular checkups to see if it changes.  But if the lipoma is painful or you want it removed for cosmetic reasons, it can be removed with surgery. The surgery is called excision. The lipoma will most likely not grow back after surgery. During surgery, the area around the lipoma is numbed. If you have a deep lipoma, you may need medicine to numb a larger area (regional anesthesia).  Or you may need medicine to put you to sleep during the procedure (general anesthesia). Then the doctor makes a cut over the area of the lipoma. He or she removes the lump of fat. The cut is then closed with stitches.  Possible complications of a lipoma  A large lipoma inside the body can press on organs, nerves, or other tissues and cause problems. For example, it can cause problems with breathing or digestion.  Living with a lipoma  Your healthcare provider may look at the lipoma during regular checkups to see if it changes or is causing problems.  When to call your healthcare provider  Call your healthcare provider right away if you have any of these:    Lipoma that grows quickly, causes pain, or feels hard    New lipomas  [employer] last reviewed this educational content on 11/1/2018 2000-2021 [employer]. All rights reserved. This information is not intended as a substitute for professional medical care. Always follow your healthcare professional's instructions.

## 2021-07-01 ENCOUNTER — TELEPHONE (OUTPATIENT)
Dept: FAMILY MEDICINE | Facility: CLINIC | Age: 56
End: 2021-07-01

## 2021-07-01 RX ORDER — ROPINIROLE 0.25 MG/1
0.25 TABLET, FILM COATED ORAL AT BEDTIME
Qty: 180 TABLET | Refills: 3 | Status: SHIPPED | OUTPATIENT
Start: 2021-07-01 | End: 2022-02-07

## 2021-07-01 ASSESSMENT — ASTHMA QUESTIONNAIRES: ACT_TOTALSCORE: 19

## 2021-07-01 ASSESSMENT — ANXIETY QUESTIONNAIRES: GAD7 TOTAL SCORE: 1

## 2021-07-01 NOTE — TELEPHONE ENCOUNTER
Please call patient:     Your hepatitis C, thyroid, liver, blood glucose, kidney, and iron tests are normal. No other cause or restless legs syndrome was found. Your leg swelling and skin changes are likely due to your right knee problem; follow-up with your orthopedic surgeon as we discussed.     Let's try a medication for restless legs syndrome. I've sent a prescription to your pharmacy. Follow-up with me in 1 month. Virtual appointment is fine.     Anai Velazquez MD     Left message on answering machine for patient to call back to the nurse at [phone].    Cecilia Lindquist RN  [facility]

## 2021-07-01 NOTE — TELEPHONE ENCOUNTER
Pt was given provider's message as written. Agrees with plan.    Roxy Quinones RN  [facility]

## 2021-07-01 NOTE — RESULT ENCOUNTER NOTE
Please call patient:    Your hepatitis C, thyroid, liver, blood glucose, kidney, and iron tests are normal. No other cause or restless legs syndrome was found. Your leg swelling and skin changes are likely due to your right knee problem; follow-up with your orthopedic surgeon as we discussed.     Let's try a medication for restless legs syndrome. I've sent a prescription to your pharmacy. Follow-up with me in 1 month. Virtual appointment is fine.     Anai Velazquez MD

## 2021-07-07 ENCOUNTER — TRANSFERRED RECORDS (OUTPATIENT)
Dept: HEALTH INFORMATION MANAGEMENT | Facility: CLINIC | Age: 56
End: 2021-07-07

## 2021-10-01 ENCOUNTER — TRANSFERRED RECORDS (OUTPATIENT)
Dept: HEALTH INFORMATION MANAGEMENT | Facility: CLINIC | Age: 56
End: 2021-10-01

## 2021-10-11 DIAGNOSIS — F41.1 GENERALIZED ANXIETY DISORDER: ICD-10-CM

## 2021-10-12 RX ORDER — ESCITALOPRAM OXALATE 10 MG/1
10 TABLET ORAL DAILY
Qty: 90 TABLET | Refills: 0 | Status: SHIPPED | OUTPATIENT
Start: 2021-10-12 | End: 2022-01-12

## 2021-10-13 ENCOUNTER — TRANSFERRED RECORDS (OUTPATIENT)
Dept: HEALTH INFORMATION MANAGEMENT | Facility: CLINIC | Age: 56
End: 2021-10-13
Payer: COMMERCIAL

## 2021-10-25 ENCOUNTER — OFFICE VISIT (OUTPATIENT)
Dept: FAMILY MEDICINE | Facility: CLINIC | Age: 56
End: 2021-10-25
Payer: COMMERCIAL

## 2021-10-25 VITALS
BODY MASS INDEX: 30.86 KG/M2 | OXYGEN SATURATION: 97 % | TEMPERATURE: 98 F | HEART RATE: 74 BPM | WEIGHT: 200.01 LBS | DIASTOLIC BLOOD PRESSURE: 77 MMHG | SYSTOLIC BLOOD PRESSURE: 130 MMHG

## 2021-10-25 DIAGNOSIS — Z01.818 PREOP GENERAL PHYSICAL EXAM: Primary | ICD-10-CM

## 2021-10-25 DIAGNOSIS — S83.241D TEAR OF MEDIAL MENISCUS OF RIGHT KNEE, CURRENT, UNSPECIFIED TEAR TYPE, SUBSEQUENT ENCOUNTER: ICD-10-CM

## 2021-10-25 PROCEDURE — 99214 OFFICE O/P EST MOD 30 MIN: CPT | Performed by: PHYSICIAN ASSISTANT

## 2021-10-25 ASSESSMENT — PAIN SCALES - GENERAL: PAINLEVEL: NO PAIN (0)

## 2021-10-25 ASSESSMENT — ASTHMA QUESTIONNAIRES
QUESTION_3 LAST FOUR WEEKS HOW OFTEN DID YOUR ASTHMA SYMPTOMS (WHEEZING, COUGHING, SHORTNESS OF BREATH, CHEST TIGHTNESS OR PAIN) WAKE YOU UP AT NIGHT OR EARLIER THAN USUAL IN THE MORNING: NOT AT ALL
QUESTION_2 LAST FOUR WEEKS HOW OFTEN HAVE YOU HAD SHORTNESS OF BREATH: ONCE OR TWICE A WEEK
QUESTION_4 LAST FOUR WEEKS HOW OFTEN HAVE YOU USED YOUR RESCUE INHALER OR NEBULIZER MEDICATION (SUCH AS ALBUTEROL): ONE OR TWO TIMES PER DAY
ACT_TOTALSCORE: 20
QUESTION_1 LAST FOUR WEEKS HOW MUCH OF THE TIME DID YOUR ASTHMA KEEP YOU FROM GETTING AS MUCH DONE AT WORK, SCHOOL OR AT HOME: NONE OF THE TIME
QUESTION_5 LAST FOUR WEEKS HOW WOULD YOU RATE YOUR ASTHMA CONTROL: WELL CONTROLLED
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
## 2021-10-25 NOTE — PATIENT INSTRUCTIONS

## 2021-10-26 ASSESSMENT — ASTHMA QUESTIONNAIRES: ACT_TOTALSCORE: 20

## 2021-11-17 ENCOUNTER — TRANSFERRED RECORDS (OUTPATIENT)
Dept: HEALTH INFORMATION MANAGEMENT | Facility: CLINIC | Age: 56
End: 2021-11-17
Payer: COMMERCIAL

## 2022-01-10 DIAGNOSIS — F41.1 GENERALIZED ANXIETY DISORDER: ICD-10-CM

## 2022-01-11 NOTE — TELEPHONE ENCOUNTER
Needs updated-Please call.  Yolanda Mills RN    PHQ-9 score:    PHQ 6/30/2021   PHQ-9 Total Score 2   Q9: Thoughts of better off dead/self-harm past 2 weeks Not at all

## 2022-01-12 RX ORDER — ESCITALOPRAM OXALATE 10 MG/1
10 TABLET ORAL DAILY
Qty: 90 TABLET | Refills: 0 | Status: SHIPPED | OUTPATIENT
Start: 2022-01-12 | End: 2022-02-07

## 2022-01-12 ASSESSMENT — PATIENT HEALTH QUESTIONNAIRE - PHQ9: SUM OF ALL RESPONSES TO PHQ QUESTIONS 1-9: 3

## 2022-01-12 NOTE — TELEPHONE ENCOUNTER
PHQ9 completed    PHQ-9 score:    PHQ 1/12/2022   PHQ-9 Total Score 3   Q9: Thoughts of better off dead/self-harm past 2 weeks Not at all       Refill sent  Per 6/30/2021 OV notes, patient was to return in 6 months  Scheduled asthma and depression f/u on 2/7/2022    Cindy Connolly RN  [facility]

## 2022-02-07 ENCOUNTER — OFFICE VISIT (OUTPATIENT)
Dept: FAMILY MEDICINE | Facility: CLINIC | Age: 57
End: 2022-02-07
Payer: COMMERCIAL

## 2022-02-07 VITALS
OXYGEN SATURATION: 98 % | WEIGHT: 197 LBS | DIASTOLIC BLOOD PRESSURE: 83 MMHG | BODY MASS INDEX: 30.92 KG/M2 | SYSTOLIC BLOOD PRESSURE: 134 MMHG | TEMPERATURE: 98.4 F | HEART RATE: 72 BPM | HEIGHT: 67 IN

## 2022-02-07 DIAGNOSIS — G25.81 RESTLESS LEGS SYNDROME (RLS): ICD-10-CM

## 2022-02-07 DIAGNOSIS — Z23 HIGH PRIORITY FOR 2019-NCOV VACCINE: ICD-10-CM

## 2022-02-07 DIAGNOSIS — F41.1 GENERALIZED ANXIETY DISORDER: ICD-10-CM

## 2022-02-07 DIAGNOSIS — F33.0 MAJOR DEPRESSIVE DISORDER, RECURRENT EPISODE, MILD (H): Primary | ICD-10-CM

## 2022-02-07 DIAGNOSIS — J30.2 SEASONAL ALLERGIC RHINITIS, UNSPECIFIED TRIGGER: ICD-10-CM

## 2022-02-07 DIAGNOSIS — Z12.11 SCREEN FOR COLON CANCER: ICD-10-CM

## 2022-02-07 DIAGNOSIS — J45.30 MILD PERSISTENT ASTHMA WITHOUT COMPLICATION: ICD-10-CM

## 2022-02-07 PROCEDURE — 96127 BRIEF EMOTIONAL/BEHAV ASSMT: CPT | Mod: 59 | Performed by: FAMILY MEDICINE

## 2022-02-07 PROCEDURE — 99214 OFFICE O/P EST MOD 30 MIN: CPT | Mod: 25 | Performed by: FAMILY MEDICINE

## 2022-02-07 PROCEDURE — 90682 RIV4 VACC RECOMBINANT DNA IM: CPT | Performed by: FAMILY MEDICINE

## 2022-02-07 PROCEDURE — 0054A COVID-19,PF,PFIZER (12+ YRS): CPT | Performed by: FAMILY MEDICINE

## 2022-02-07 PROCEDURE — 91305 COVID-19,PF,PFIZER (12+ YRS): CPT | Performed by: FAMILY MEDICINE

## 2022-02-07 PROCEDURE — 90471 IMMUNIZATION ADMIN: CPT | Performed by: FAMILY MEDICINE

## 2022-02-07 RX ORDER — ALBUTEROL SULFATE 0.83 MG/ML
SOLUTION RESPIRATORY (INHALATION)
Qty: 90 ML | Refills: 0 | Status: SHIPPED | OUTPATIENT
Start: 2022-02-07 | End: 2023-06-30

## 2022-02-07 RX ORDER — MONTELUKAST SODIUM 10 MG/1
10 TABLET ORAL AT BEDTIME
Qty: 90 TABLET | Refills: 3 | Status: SHIPPED | OUTPATIENT
Start: 2022-02-07 | End: 2023-02-20

## 2022-02-07 RX ORDER — ROPINIROLE 0.25 MG/1
0.5 TABLET, FILM COATED ORAL AT BEDTIME
Qty: 180 TABLET | Refills: 3 | Status: SHIPPED | OUTPATIENT
Start: 2022-02-07 | End: 2023-02-20

## 2022-02-07 RX ORDER — ESCITALOPRAM OXALATE 10 MG/1
10 TABLET ORAL DAILY
Qty: 90 TABLET | Refills: 1 | Status: SHIPPED | OUTPATIENT
Start: 2022-02-07 | End: 2022-10-18

## 2022-02-07 ASSESSMENT — ANXIETY QUESTIONNAIRES
3. WORRYING TOO MUCH ABOUT DIFFERENT THINGS: SEVERAL DAYS
IF YOU CHECKED OFF ANY PROBLEMS ON THIS QUESTIONNAIRE, HOW DIFFICULT HAVE THESE PROBLEMS MADE IT FOR YOU TO DO YOUR WORK, TAKE CARE OF THINGS AT HOME, OR GET ALONG WITH OTHER PEOPLE: SOMEWHAT DIFFICULT
6. BECOMING EASILY ANNOYED OR IRRITABLE: SEVERAL DAYS
5. BEING SO RESTLESS THAT IT IS HARD TO SIT STILL: NOT AT ALL
GAD7 TOTAL SCORE: 3
2. NOT BEING ABLE TO STOP OR CONTROL WORRYING: SEVERAL DAYS
1. FEELING NERVOUS, ANXIOUS, OR ON EDGE: NOT AT ALL
7. FEELING AFRAID AS IF SOMETHING AWFUL MIGHT HAPPEN: NOT AT ALL

## 2022-02-07 ASSESSMENT — PATIENT HEALTH QUESTIONNAIRE - PHQ9
5. POOR APPETITE OR OVEREATING: NOT AT ALL
SUM OF ALL RESPONSES TO PHQ QUESTIONS 1-9: 1

## 2022-02-07 ASSESSMENT — MIFFLIN-ST. JEOR: SCORE: 1525.09

## 2022-02-07 ASSESSMENT — ASTHMA QUESTIONNAIRES: ACT_TOTALSCORE: 18

## 2022-02-07 NOTE — PATIENT INSTRUCTIONS
Patient Education     Controlling Your Asthma  You can do a lot to manage your asthma and improve your quality of life. You will need to work with your healthcare provider to make a plan. But it s up to you to put this plan into action.  Why you need to take control  You need to control the inflammation in your lungs to feel well and stay healthy. Take all medicine as directed, especially controller medicines. Take them even if you feel that your asthma is under good control. You also need to ease symptoms when you have them. These are long-term tasks. But the more you stay in control, the better you ll feel. If you don t stay in control:    Asthma symptoms may cause you to miss school, work, or activities that you enjoy.    Asthma flare-ups can be dangerous, even deadly.    Uncontrolled asthma makes it more likely that you will need emergency care.    Uncontrolled asthma may cause lasting damage to your lungs.    Peak flow monitoring helps measure how open your airways are.   Taking medicine helps you control your asthma and ease symptoms when they occur.     Using an Asthma Action Plan will help you keep track of and respond to asthma symptoms.   Staying away from triggers--the things that inflame your airways--will help prevent symptoms and flare-ups.   Your action plan  Your healthcare provider will help you prepare, and when needed, update your personal asthma action plan. Your plan tells you what to do based on your current symptoms. If you don't have an asthma action plan, or if yours isn't up-to-date, make sure you talk with your healthcare provider. Keep a journal of your symptoms and triggers. Take your journal and asthma action plan with you when you visit your healthcare provider. That way, your treatment plan can be reviewed and updated regularly.  "[employer] last reviewed this educational content on 5/1/2019 2000-2021 [employer]. All rights reserved. This information is not intended  as a substitute for professional medical care. Always follow your healthcare professional's instructions.           Patient Education     Understanding Nasal Allergies  Nasal allergies (also called allergic rhinitis) are a common health problem. They may be seasonal. This means they cause symptoms only at certain times of the year. Or they may be perennial. This means they cause symptoms all year long. Other health problems, such as asthma, often occur along with allergies as well.    What is an allergen?  An allergy is a reaction to a substance called an allergen. Common allergens include:    Wind-borne pollen (from grass, trees, ragweed)    Mold    Dust mites    Furry and feathered animals    Pests, such as cockroaches or rodents in a home or other building  Normally allergens are harmless. But when a person has allergies, the body thinks these allergens are harmful. The body then attacks allergens with antibodies. Allergy antibodies are attached to special cells called mast cells. Allergens stick to the antibodies. This makes the mast cells release histamine and other chemicals. This is an allergic reaction. The chemicals irritate nearby nasal tissue. This causes nasal allergy symptoms. When this happens in the breathing tubes of the lungs, it can cause asthma symptoms such as cough and wheeze.  Common nasal allergy symptoms  Allergies can cause nasal tissue to swell. This makes the air passages smaller. The nose may feel stuffed up or itchy. The nose may also make extra mucus. This can plug the nasal passages or drip out of the nose. Mucus can drip down the back of the throat (postnasal drip) as well. Sinus tissue can swell. This may cause pain and headache. Common allergy symptoms include:    Runny nose with clear, watery discharge    Stuffy nose (nasal congestion)    Drainage down your throat (postnasal drip)    Sneezing    Red, watery eyes    Itchy nose, eyes, ears, and throat    Plugged-up ears (ear  congestion)    Sore throat    Coughing    Sinus pain and swelling    Headache  It may not be allergies  Other health problems can cause symptoms like those of nasal allergies. These include:    Nonallergic rhinitis and viruses such as colds    Irritants and pollutants such as strong odors or smoke    Common home cleaning products, especially if they have strong odors.    Certain medicines    Changes in the weather  Treatment  Your healthcare provider will evaluate you to find the cause of your symptoms. Then he or she will advise treatment. If your symptoms are due to nasal allergies, your provider may prescribe nasal steroid sprays or antihistamines taken by mouth (oral) to help reduce symptoms. Staying away from the allergen will also be suggested. You may also be referred to an allergist.   [employer] last reviewed this educational content on 5/1/2019 2000-2021 [employer]. All rights reserved. This information is not intended as a substitute for professional medical care. Always follow your healthcare professional's instructions.

## 2022-02-07 NOTE — PROGRESS NOTES
"  Assessment & Plan     (F33.0) Major depressive disorder, recurrent episode, mild (H)  (primary encounter diagnosis)  (F41.1) Generalized anxiety disorder  Comment: Well controlled with medications without side effects.   Plan: escitalopram (LEXAPRO) 10 MG tablet          (J45.30) Mild persistent asthma without complication  (J30.2) Seasonal allergic rhinitis, unspecified trigger  Plan: montelukast (SINGULAIR) 10 MG tablet, Adult         Allergy/Asthma Referral          (G25.81) Restless legs syndrome (RLS)  Comment: Well controlled with medications without side effects.   Plan: rOPINIRole (REQUIP) 0.25 MG tablet          (Z12.11) Screen for colon cancer  Plan: Fecal colorectal cancer screen (FIT)            BMI:   Estimated body mass index is 30.92 kg/m  as calculated from the following:    Height as of this encounter: 1.7 m (5' 6.93\").    Weight as of this encounter: 89.4 kg (197 lb).   Weight management plan: Discussed healthy diet and exercise guidelines     See Patient Instructions    Return in about 6 weeks (around 3/21/2022) for physical, mammogram.    Anai Velazquez MD  [facility] STACY Robbins is a 56 year old who presents for the following health issues     HPI     Depression and Anxiety Follow-Up    How are you doing with your depression since your last visit? No change    How are you doing with your anxiety since your last visit?  No change    Are you having other symptoms that might be associated with depression or anxiety? Yes:  Emotional easily    Have you had a significant life event? Job Concerns     Do you have any concerns with your use of alcohol or other drugs? No    Social History     Tobacco Use     Smoking status: Never Smoker     Smokeless tobacco: Never Used   Substance Use Topics     Alcohol use: Yes     Drug use: No     PHQ 6/30/2021 1/12/2022 2/7/2022   PHQ-9 Total Score 2 3 1   Q9: Thoughts of better off dead/self-harm past 2 weeks Not at all Not at all " Not at all     DORYS-7 SCORE 3/15/2021 6/30/2021 2/7/2022   Total Score - - -   Total Score 2 1 3     Last PHQ-9 2/7/2022   1.  Little interest or pleasure in doing things 0   2.  Feeling down, depressed, or hopeless 0   3.  Trouble falling or staying asleep, or sleeping too much 0   4.  Feeling tired or having little energy 1   5.  Poor appetite or overeating 0   6.  Feeling bad about yourself 0   7.  Trouble concentrating 0   8.  Moving slowly or restless 0   Q9: Thoughts of better off dead/self-harm past 2 weeks 0   PHQ-9 Total Score 1   Difficulty at work, home, or with people Not difficult at all     DORYS-7  2/7/2022   1. Feeling nervous, anxious, or on edge 0   2. Not being able to stop or control worrying 1   3. Worrying too much about different things 1   4. Trouble relaxing 0   5. Being so restless that it is hard to sit still 0   6. Becoming easily annoyed or irritable 1   7. Feeling afraid, as if something awful might happen 0   DORYS-7 Total Score 3   If you checked any problems, how difficult have they made it for you to do your work, take care of things at home, or get along with other people? Somewhat difficult       Suicide Assessment Five-step Evaluation and Treatment (SAFE-T)     Asthma Follow-Up    Was ACT completed today?    Yes    ACT Total Scores 2/7/2022   ACT TOTAL SCORE -   ASTHMA ER VISITS -   ASTHMA HOSPITALIZATIONS -   ACT TOTAL SCORE (Goal Greater than or Equal to 20) 18   In the past 12 months, how many times did you visit the emergency room for your asthma without being admitted to the hospital? 0   In the past 12 months, how many times were you hospitalized overnight because of your asthma? 0         How many days per week do you miss taking your asthma controller medication?  0    Please describe any recent triggers for your asthma: Climbing the stairs    Have you had any Emergency Room Visits, Urgent Care Visits, or Hospital Admissions since your last office visit?  No      How many  "servings of fruits and vegetables do you eat daily?  0-1    On average, how many sweetened beverages do you drink each day (Examples: soda, juice, sweet tea, etc.  Do NOT count diet or artificially sweetened beverages)?   1    How many days per week do you exercise enough to make your heart beat faster? 7    How many minutes a day do you exercise enough to make your heart beat faster? 20 - 29    How many days per week do you miss taking your medication? 0        Review of Systems   CONSTITUTIONAL: NEGATIVE for fever, chills, change in weight  INTEGUMENTARY/SKIN: NEGATIVE for worrisome rashes, moles or lesions  EYES: NEGATIVE for vision changes or irritation  ENT/MOUTH: postnasal drainage  RESP:Hx asthma  CV: NEGATIVE for chest pain, palpitations or peripheral edema      Objective    /83 (BP Location: Left arm, Patient Position: Sitting, Cuff Size: Adult Large)   Pulse 72   Temp 98.4  F (36.9  C) (Oral)   Ht 1.7 m (5' 6.93\")   Wt 89.4 kg (197 lb)   SpO2 98%   BMI 30.92 kg/m    Body mass index is 30.92 kg/m .  Physical Exam   GENERAL: alert, no distress and obese  EYES: Eyes grossly normal to inspection, PERRL and conjunctivae and sclerae normal  HENT: ear canals and TM's normal, nose and mouth without ulcers or lesions  NECK: no adenopathy, no asymmetry, masses, or scars and thyroid normal to palpation  RESP: lungs clear to auscultation - no rales, rhonchi or wheezes  CV: regular rate and rhythm, normal S1 S2, no S3 or S4, no murmur, click or rub, no peripheral edema    MS: no gross musculoskeletal defects noted, no edema  PSYCH: mentation appears normal, affect normal/bright    Office Visit on 06/30/2021   Component Date Value Ref Range Status     Hepatitis C Antibody 06/30/2021 Nonreactive  NR^Nonreactive Final    Comment: Assay performance characteristics have not been established for newborns,   infants, and children       Sodium 06/30/2021 139  133 - 144 mmol/L Final     Potassium 06/30/2021 3.8  3.4 " - 5.3 mmol/L Final     Chloride 06/30/2021 108  94 - 109 mmol/L Final     Carbon Dioxide 06/30/2021 26  20 - 32 mmol/L Final     Anion Gap 06/30/2021 5  3 - 14 mmol/L Final     Glucose 06/30/2021 93  70 - 99 mg/dL Final    Non Fasting     Urea Nitrogen 06/30/2021 17  7 - 30 mg/dL Final     Creatinine 06/30/2021 0.68  0.52 - 1.04 mg/dL Final     GFR Estimate 06/30/2021 >90  >60 mL/min/[1.73_m2] Final    Comment: Non  GFR Calc  Starting 12/18/2018, serum creatinine based estimated GFR (eGFR) will be   calculated using the Chronic Kidney Disease Epidemiology Collaboration   (CKD-EPI) equation.       GFR Estimate If Black 06/30/2021 >90  >60 mL/min/[1.73_m2] Final    Comment:  GFR Calc  Starting 12/18/2018, serum creatinine based estimated GFR (eGFR) will be   calculated using the Chronic Kidney Disease Epidemiology Collaboration   (CKD-EPI) equation.       Calcium 06/30/2021 8.6  8.5 - 10.1 mg/dL Final     Bilirubin Total 06/30/2021 0.3  0.2 - 1.3 mg/dL Final     Albumin 06/30/2021 3.7  3.4 - 5.0 g/dL Final     Protein Total 06/30/2021 7.0  6.8 - 8.8 g/dL Final     Alkaline Phosphatase 06/30/2021 77  40 - 150 U/L Final     ALT 06/30/2021 26  0 - 50 U/L Final     AST 06/30/2021 14  0 - 45 U/L Final     WBC 06/30/2021 5.9  4.0 - 11.0 10e9/L Final     RBC Count 06/30/2021 4.09  3.8 - 5.2 10e12/L Final     Hemoglobin 06/30/2021 12.6  11.7 - 15.7 g/dL Final     Hematocrit 06/30/2021 38.9  35.0 - 47.0 % Final     MCV 06/30/2021 95  78 - 100 fl Final     MCH 06/30/2021 30.8  26.5 - 33.0 pg Final     MCHC 06/30/2021 32.4  31.5 - 36.5 g/dL Final     RDW 06/30/2021 13.5  10.0 - 15.0 % Final     Platelet Count 06/30/2021 285  150 - 450 10e9/L Final     Diff Method 06/30/2021 Automated Method   Final     % Neutrophils 06/30/2021 61.3  % Final     % Lymphocytes 06/30/2021 18.3  % Final     % Monocytes 06/30/2021 11.4  % Final     % Eosinophils 06/30/2021 8.5  % Final     % Basophils 06/30/2021 0.5  %  Final     Absolute Neutrophil 06/30/2021 3.6  1.6 - 8.3 10e9/L Final     Absolute Lymphocytes 06/30/2021 1.1  0.8 - 5.3 10e9/L Final     Absolute Monocytes 06/30/2021 0.7  0.0 - 1.3 10e9/L Final     Absolute Eosinophils 06/30/2021 0.5  0.0 - 0.7 10e9/L Final     Absolute Basophils 06/30/2021 0.0  0.0 - 0.2 10e9/L Final     Ferritin 06/30/2021 111  8 - 252 ng/mL Final     TSH 06/30/2021 0.49  0.40 - 4.00 mU/L Final

## 2022-02-08 ASSESSMENT — ANXIETY QUESTIONNAIRES: GAD7 TOTAL SCORE: 3

## 2022-02-14 ENCOUNTER — OFFICE VISIT (OUTPATIENT)
Dept: OPHTHALMOLOGY | Facility: CLINIC | Age: 57
End: 2022-02-14
Payer: COMMERCIAL

## 2022-02-14 DIAGNOSIS — Z01.00 EXAMINATION OF EYES AND VISION: Primary | ICD-10-CM

## 2022-02-14 DIAGNOSIS — H52.4 PRESBYOPIA: ICD-10-CM

## 2022-02-14 PROCEDURE — 92004 COMPRE OPH EXAM NEW PT 1/>: CPT | Performed by: OPHTHALMOLOGY

## 2022-02-14 PROCEDURE — 92015 DETERMINE REFRACTIVE STATE: CPT | Performed by: OPHTHALMOLOGY

## 2022-02-14 ASSESSMENT — REFRACTION_MANIFEST
OD_ADD: +2.00
OS_ADD: +2.00
OD_AXIS: 175
OS_CYLINDER: +0.75
OS_AXIS: 006
OD_SPHERE: -0.25
OS_SPHERE: -0.50
OD_CYLINDER: +1.00

## 2022-02-14 ASSESSMENT — CUP TO DISC RATIO
OS_RATIO: 0.3
OD_RATIO: 0.2

## 2022-02-14 ASSESSMENT — CONF VISUAL FIELD: OD_NORMAL: 1

## 2022-02-14 ASSESSMENT — EXTERNAL EXAM - LEFT EYE: OS_EXAM: 1+ BROW PTOSIS

## 2022-02-14 ASSESSMENT — VISUAL ACUITY
OD_SC: 20/20
OD_SC+: -1
OS_SC: 20/25
METHOD: SNELLEN - LINEAR
OS_SC+: +2

## 2022-02-14 ASSESSMENT — TONOMETRY
IOP_METHOD: APPLANATION
OD_IOP_MMHG: 18
OS_IOP_MMHG: 20

## 2022-02-14 ASSESSMENT — EXTERNAL EXAM - RIGHT EYE: OD_EXAM: 1+ BROW PTOSIS

## 2022-02-14 NOTE — LETTER
2/14/2022         RE: Itzel Zacarias  [address]  [place] [address]        Dear Colleague,    Thank you for referring your patient, Itzel Zacarias, to the [facility]. Please see a copy of my visit note below.     Current Eye Medications:  None     Subjective:  Comprehensive eye exam. PT notes distance vision is good. Wears otc readers for near vision. Denies floaters and flashes.     Objective:  See Ophthalmology Exam.       Assessment:  Baseline eye exam in patient with presbyopia.      ICD-10-CM    1. Examination of eyes and vision  Z01.00    2. Presbyopia  H52.4         Plan:  Glasses Rx given - optional  Okay to use OTC +1.75 - +2.00 half readers.   May use artificial tears up to 4 times daily both eyes. (Refresh Tears, Systane Ultra/Balance, or Theratears)   Call in October 2023 for an appointment in February 2024 for Complete Exam    Dr. Mathews [phone]                Again, thank you for allowing me to participate in the care of your patient.        Sincerely,        Red Mathews MD

## 2022-02-14 NOTE — PATIENT INSTRUCTIONS
Glasses Rx given - optional  Okay to use OTC +1.75 - +2.00 half readers.   May use artificial tears up to 4 times daily both eyes. (Refresh Tears, Systane Ultra/Balance, or Theratears)   Call in October 2023 for an appointment in February 2024 for Complete Exam    Dr. Mathews [phone]

## 2022-02-14 NOTE — PROGRESS NOTES
Current Eye Medications:  None     Subjective:  Comprehensive eye exam. PT notes distance vision is good. Wears otc readers for near vision. Denies floaters and flashes.     Objective:  See Ophthalmology Exam.       Assessment:  Baseline eye exam in patient with presbyopia.      ICD-10-CM    1. Examination of eyes and vision  Z01.00    2. Presbyopia  H52.4         Plan:  Glasses Rx given - optional  Okay to use OTC +1.75 - +2.00 half readers.   May use artificial tears up to 4 times daily both eyes. (Refresh Tears, Systane Ultra/Balance, or Theratears)   Call in October 2023 for an appointment in February 2024 for Complete Exam    Dr. Mathews [phone]

## 2022-02-21 ENCOUNTER — APPOINTMENT (OUTPATIENT)
Dept: OPTOMETRY | Facility: CLINIC | Age: 57
End: 2022-02-21
Payer: COMMERCIAL

## 2022-02-21 PROCEDURE — 92340 FIT SPECTACLES MONOFOCAL: CPT | Performed by: OPTOMETRIST

## 2022-03-30 ENCOUNTER — ANCILLARY PROCEDURE (OUTPATIENT)
Dept: MAMMOGRAPHY | Facility: CLINIC | Age: 57
End: 2022-03-30
Attending: NURSE PRACTITIONER
Payer: COMMERCIAL

## 2022-03-30 DIAGNOSIS — Z12.31 ENCOUNTER FOR SCREENING MAMMOGRAM FOR BREAST CANCER: ICD-10-CM

## 2022-03-30 PROCEDURE — 77067 SCR MAMMO BI INCL CAD: CPT | Mod: TC | Performed by: RADIOLOGY

## 2022-05-25 ENCOUNTER — OFFICE VISIT (OUTPATIENT)
Dept: FAMILY MEDICINE | Facility: CLINIC | Age: 57
End: 2022-05-25
Payer: COMMERCIAL

## 2022-05-25 VITALS
DIASTOLIC BLOOD PRESSURE: 76 MMHG | SYSTOLIC BLOOD PRESSURE: 120 MMHG | BODY MASS INDEX: 30.29 KG/M2 | HEIGHT: 67 IN | WEIGHT: 193 LBS | HEART RATE: 79 BPM | OXYGEN SATURATION: 97 % | TEMPERATURE: 98.7 F

## 2022-05-25 DIAGNOSIS — D17.30 LIPOMA OF SKIN AND SUBCUTANEOUS TISSUE: ICD-10-CM

## 2022-05-25 DIAGNOSIS — Z00.00 ROUTINE GENERAL MEDICAL EXAMINATION AT A HEALTH CARE FACILITY: Primary | ICD-10-CM

## 2022-05-25 PROCEDURE — G0145 SCR C/V CYTO,THINLAYER,RESCR: HCPCS | Performed by: FAMILY MEDICINE

## 2022-05-25 PROCEDURE — 87624 HPV HI-RISK TYP POOLED RSLT: CPT | Performed by: FAMILY MEDICINE

## 2022-05-25 PROCEDURE — 99396 PREV VISIT EST AGE 40-64: CPT | Performed by: FAMILY MEDICINE

## 2022-05-25 ASSESSMENT — ENCOUNTER SYMPTOMS
FREQUENCY: 0
CHILLS: 0
NAUSEA: 0
EYE PAIN: 0
BREAST MASS: 0
HEMATURIA: 0
FEVER: 0
DIARRHEA: 0
DIZZINESS: 0
ARTHRALGIAS: 1
DYSURIA: 0
HEARTBURN: 0
HEMATOCHEZIA: 0
COUGH: 0
ABDOMINAL PAIN: 0
NERVOUS/ANXIOUS: 0
PALPITATIONS: 0
MYALGIAS: 1
JOINT SWELLING: 1
PARESTHESIAS: 0
WEAKNESS: 0
SHORTNESS OF BREATH: 1
CONSTIPATION: 0
SORE THROAT: 0
HEADACHES: 0
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
## 2022-05-27 LAB
BKR LAB AP GYN ADEQUACY: NORMAL
BKR LAB AP GYN INTERPRETATION: NORMAL
BKR LAB AP HPV REFLEX: NORMAL
BKR LAB AP PREVIOUS ABNORMAL: NORMAL
PATH REPORT.COMMENTS IMP SPEC: NORMAL
PATH REPORT.COMMENTS IMP SPEC: NORMAL
PATH REPORT.RELEVANT HX SPEC: NORMAL

## 2022-05-31 ENCOUNTER — OFFICE VISIT (OUTPATIENT)
Dept: SURGERY | Facility: CLINIC | Age: 57
End: 2022-05-31
Attending: FAMILY MEDICINE
Payer: COMMERCIAL

## 2022-05-31 VITALS
DIASTOLIC BLOOD PRESSURE: 79 MMHG | BODY MASS INDEX: 30.45 KG/M2 | SYSTOLIC BLOOD PRESSURE: 121 MMHG | HEART RATE: 86 BPM | WEIGHT: 194 LBS

## 2022-05-31 DIAGNOSIS — D17.30 LIPOMA OF SKIN AND SUBCUTANEOUS TISSUE: ICD-10-CM

## 2022-05-31 PROCEDURE — 99243 OFF/OP CNSLTJ NEW/EST LOW 30: CPT | Performed by: SURGERY
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
## 2022-06-02 LAB
HUMAN PAPILLOMA VIRUS 16 DNA: NEGATIVE
HUMAN PAPILLOMA VIRUS 18 DNA: NEGATIVE
HUMAN PAPILLOMA VIRUS FINAL DIAGNOSIS: NORMAL
HUMAN PAPILLOMA VIRUS OTHER HR: NEGATIVE

## 2022-06-09 ENCOUNTER — OFFICE VISIT (OUTPATIENT)
Dept: ALLERGY | Facility: CLINIC | Age: 57
End: 2022-06-09
Payer: COMMERCIAL

## 2022-06-09 VITALS
BODY MASS INDEX: 30.2 KG/M2 | SYSTOLIC BLOOD PRESSURE: 150 MMHG | OXYGEN SATURATION: 96 % | DIASTOLIC BLOOD PRESSURE: 83 MMHG | HEART RATE: 64 BPM | WEIGHT: 192.4 LBS

## 2022-06-09 DIAGNOSIS — J31.0 CHRONIC RHINITIS: ICD-10-CM

## 2022-06-09 DIAGNOSIS — J45.30 MILD PERSISTENT ASTHMA WITHOUT COMPLICATION: ICD-10-CM

## 2022-06-09 DIAGNOSIS — R09.82 POST-NASAL DRAINAGE: Primary | ICD-10-CM

## 2022-06-09 PROCEDURE — 86003 ALLG SPEC IGE CRUDE XTRC EA: CPT | Performed by: ALLERGY & IMMUNOLOGY

## 2022-06-09 PROCEDURE — 99244 OFF/OP CNSLTJ NEW/EST MOD 40: CPT | Performed by: ALLERGY & IMMUNOLOGY

## 2022-06-09 PROCEDURE — 36415 COLL VENOUS BLD VENIPUNCTURE: CPT | Performed by: ALLERGY & IMMUNOLOGY

## 2022-06-09 RX ORDER — AZELASTINE 1 MG/ML
1-2 SPRAY, METERED NASAL 2 TIMES DAILY
Qty: 30 ML | Refills: 3 | Status: SHIPPED | OUTPATIENT
Start: 2022-06-09 | End: 2023-06-30

## 2022-06-09 ASSESSMENT — ENCOUNTER SYMPTOMS
WHEEZING: 0
EYE DISCHARGE: 0
NAUSEA: 0
MYALGIAS: 0
EYE REDNESS: 0
DIARRHEA: 0
COUGH: 1
SINUS PRESSURE: 0
RHINORRHEA: 1
CHILLS: 0
ARTHRALGIAS: 0
ADENOPATHY: 0
VOMITING: 0
HEADACHES: 0
FEVER: 0
JOINT SWELLING: 0
ACTIVITY CHANGE: 0
CHEST TIGHTNESS: 0
FACIAL SWELLING: 0
EYE ITCHING: 0
SHORTNESS OF BREATH: 1

## 2022-06-09 ASSESSMENT — ASTHMA QUESTIONNAIRES: ACT_TOTALSCORE: 21
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
## 2022-06-09 NOTE — PATIENT INSTRUCTIONS
If you have any questions regarding your allergies, asthma, or what we discussed during your visit today please call the allergy clinic or contact us via W4.    [facility] Allergy RN Line: [phone] - call this number with any questions during or after business/clinic hours  [facility] Scheduling Line: [phone]  [facility] Scheduling Line: [phone] - this number is ONLY for scheduling at the [facility] and should not be used to get in touch with the allergy team    All visits for food challenges, medication/drug allergy testing, and drug challenges MUST be scheduled through the allergy clinic nurse. Please call the nurse at [phone] or send a W4 message for scheduling. Appointments for these visits that are made through the schedulers or via W4 may be cancelled or rescheduled.    Clinic Schedule:   [place] - Monday, Tuesday, and Thursday  [address] [place] [id number]    [facility] - Wednesday  [facility] [address] [place] [address]      Start the azelastine nasal spray - 1 to 2 sprays in each nostril once to twice a day  Continue taking the Flonase and Zyrtec every day  Labs today

## 2022-06-11 LAB
CALIF WALNUT POLN IGE QN: <0.1 KU(A)/L
COMMON RAGWEED IGE QN: <0.1 KU(A)/L
D PTERONYSS IGE QN: <0.1 KU(A)/L
DOG DANDER+EPITH IGE QN: <0.1 KU(A)/L
E PURPURASCENS IGE QN: <0.1 KU(A)/L
EAST WHITE PINE IGE QN: <0.1 KU(A)/L
ENGL PLANTAIN IGE QN: <0.1 KU(A)/L
FIREBUSH IGE QN: <0.1 KU(A)/L
GIANT RAGWEED IGE QN: <0.1 KU(A)/L
JOHNSON GRASS IGE QN: <0.1 KU(A)/L
MAPLE IGE QN: <0.1 KU(A)/L
MUGWORT IGE QN: <0.1 KU(A)/L
NETTLE IGE QN: <0.1 KU(A)/L
P NOTATUM IGE QN: <0.1 KU(A)/L
RED MULBERRY IGE QN: <0.1 KU(A)/L
SALTWORT IGE QN: <0.1 KU(A)/L
SHEEP SORREL IGE QN: <0.1 KU(A)/L
SILVER BIRCH IGE QN: <0.1 KU(A)/L
TIMOTHY IGE QN: <0.1 KU(A)/L
WHITE ELM IGE QN: <0.1 KU(A)/L
WHITE MULBERRY IGE QN: <0.1 KU(A)/L
WHITE OAK IGE QN: <0.1 KU(A)/L
WORMWOOD IGE QN: <0.1 KU(A)/L

## 2022-06-13 LAB
A ALTERNATA IGE QN: <0.1 KU(A)/L
A FUMIGATUS IGE QN: <0.1 KU(A)/L
C HERBARUM IGE QN: <0.1 KU(A)/L
CAT DANDER IGG QN: <0.1 KU(A)/L
CEDAR IGE QN: <0.1 KU(A)/L
COTTONWOOD IGE QN: <0.1 KU(A)/L
D FARINAE IGE QN: <0.1 KU(A)/L
GOOSEFOOT IGE QN: <0.1 KU(A)/L
WHITE ASH IGE QN: <0.1 KU(A)/L

## 2022-07-06 NOTE — RESULT ENCOUNTER NOTE
Mail letter:    Your results are normal.  Anai Velazquez MD     Significant decrease of oral intake greater than 3 days prior to admission

## 2022-07-08 DIAGNOSIS — J30.89 SEASONAL ALLERGIC RHINITIS DUE TO OTHER ALLERGIC TRIGGER: ICD-10-CM

## 2022-07-10 RX ORDER — CETIRIZINE HYDROCHLORIDE 10 MG/1
10 TABLET ORAL DAILY
Qty: 90 TABLET | Refills: 2 | Status: SHIPPED | OUTPATIENT
Start: 2022-07-10 | End: 2023-04-24

## 2022-07-10 NOTE — TELEPHONE ENCOUNTER
Prescription approved per [facility] Refill Protocol.  Yolanda Mills RN

## 2022-08-22 ENCOUNTER — TELEPHONE (OUTPATIENT)
Dept: ALLERGY | Facility: CLINIC | Age: 57
End: 2022-08-22

## 2022-08-22 DIAGNOSIS — J31.0 NONALLERGIC RHINITIS: Primary | ICD-10-CM

## 2022-08-22 NOTE — TELEPHONE ENCOUNTER
Please call patient regarding lab results. All of her tests were negative for allergies to cats, dogs, molds, dust mites, and pollens. Recommend ongoing follow-up with PCP or can place referral for her to see ENT if she would like.

## 2022-08-23 NOTE — TELEPHONE ENCOUNTER
Patient returned call.    Advised patient of Dr. Gamboa's message below.  Patient states she will discuss with her PCP.    Norma MASON RN 8/23/2022 12:09 PM

## 2022-10-21 ENCOUNTER — OFFICE VISIT (OUTPATIENT)
Dept: INTERNAL MEDICINE | Facility: CLINIC | Age: 57
End: 2022-10-21
Payer: COMMERCIAL

## 2022-10-21 VITALS
TEMPERATURE: 98.2 F | BODY MASS INDEX: 29.51 KG/M2 | WEIGHT: 188 LBS | SYSTOLIC BLOOD PRESSURE: 132 MMHG | DIASTOLIC BLOOD PRESSURE: 81 MMHG | OXYGEN SATURATION: 97 % | HEART RATE: 80 BPM

## 2022-10-21 DIAGNOSIS — J01.90 ACUTE SINUSITIS WITH COEXISTING CONDITION, NEED PROPHYLACTIC TREATMENT: Primary | ICD-10-CM

## 2022-10-21 DIAGNOSIS — J06.9 UPPER RESPIRATORY TRACT INFECTION, UNSPECIFIED TYPE: ICD-10-CM

## 2022-10-21 LAB
FLUAV AG SPEC QL IA: NEGATIVE
FLUBV AG SPEC QL IA: NEGATIVE
SARS-COV-2 RNA RESP QL NAA+PROBE: NEGATIVE

## 2022-10-21 PROCEDURE — U0003 INFECTIOUS AGENT DETECTION BY NUCLEIC ACID (DNA OR RNA); SEVERE ACUTE RESPIRATORY SYNDROME CORONAVIRUS 2 (SARS-COV-2) (CORONAVIRUS DISEASE [COVID-19]), AMPLIFIED PROBE TECHNIQUE, MAKING USE OF HIGH THROUGHPUT TECHNOLOGIES AS DESCRIBED BY CMS-2020-01-R: HCPCS | Performed by: INTERNAL MEDICINE

## 2022-10-21 PROCEDURE — 87804 INFLUENZA ASSAY W/OPTIC: CPT | Performed by: INTERNAL MEDICINE

## 2022-10-21 PROCEDURE — U0005 INFEC AGEN DETEC AMPLI PROBE: HCPCS | Performed by: INTERNAL MEDICINE

## 2022-10-21 PROCEDURE — 99213 OFFICE O/P EST LOW 20 MIN: CPT | Mod: CS | Performed by: INTERNAL MEDICINE

## 2022-10-21 RX ORDER — AMOXICILLIN 500 MG/1
CAPSULE ORAL
Qty: 40 CAPSULE | Refills: 0 | Status: SHIPPED | OUTPATIENT
Start: 2022-10-21 | End: 2023-01-17

## 2022-10-21 ASSESSMENT — PATIENT HEALTH QUESTIONNAIRE - PHQ9
SUM OF ALL RESPONSES TO PHQ QUESTIONS 1-9: 0
10. IF YOU CHECKED OFF ANY PROBLEMS, HOW DIFFICULT HAVE THESE PROBLEMS MADE IT FOR YOU TO DO YOUR WORK, TAKE CARE OF THINGS AT HOME, OR GET ALONG WITH OTHER PEOPLE: NOT DIFFICULT AT ALL
SUM OF ALL RESPONSES TO PHQ QUESTIONS 1-9: 0

## 2022-10-21 NOTE — PROGRESS NOTES
Assessment & Plan     Acute sinusitis with coexisting condition, need prophylactic treatment   >10 days  - Symptomatic; Yes; 10/14/2022 COVID-19 Virus (Coronavirus) by PCR Nose  - Influenza A & B Antigen - Clinic Collect  - amoxicillin (AMOXIL) 500 MG capsule; 2 CAPSULE2 TIMES A DAY FOR 10 DAYS Strength: 500 mg   Upper respiratory tract infection, unspecified type  - amoxicillin (AMOXIL) 500 MG capsule; 2 CAPSULE2 TIMES A DAY FOR 10 DAYS Strength: 500 mg      I spent a total of 20 minutes on the day of the visit.   Time spent doing chart review, history and exam, documentation and further activities per the note            Return in about 7 months (around 5/21/2023) for Physical Exam.    Miracle Peña MD  [facility] STACY Robbins is a 56 year old, presenting for the following health issues:  No chief complaint on file.    55 y/o F here with c/o sinus headache     H/o cervical djd, mild persistent asthma, MDD / DORYS, HTN   >Recently had allergy testing panel, negative.        Symptoms ongoing despite local cares.   This is a recurrent problem.   The OTC symptoms not working.     Asthma is controlled.         History of Present Illness       Reason for visit:  Nasal congestion  Symptom onset:  1-2 weeks ago  Symptoms include:  Sinus congestion  Symptom intensity:  Moderate  Symptom progression:  Staying the same  Had these symptoms before:  Yes  Has tried/received treatment for these symptoms:  No  What makes it worse:  No    She eats 0-1 servings of fruits and vegetables daily.She consumes 1 sweetened beverage(s) daily.She exercises with enough effort to increase her heart rate 10 to 19 minutes per day.  She exercises with enough effort to increase her heart rate 3 or less days per week.   She is taking medications regularly.    Today's PHQ-9         PHQ-9 Total Score: 0    PHQ-9 Q9 Thoughts of better off dead/self-harm past 2 weeks :   Not at all    How difficult have these  problems made it for you to do your work, take care of things at home, or get along with other people: Not difficult at all             Review of Systems   Constitutional, HEENT, cardiovascular, pulmonary, gi and gu systems are negative, except as otherwise noted.      Objective    /81 (BP Location: Right arm, Patient Position: Sitting, Cuff Size: Adult Regular)   Pulse 80   Temp 98.2  F (36.8  C) (Oral)   Wt 85.3 kg (188 lb)   SpO2 97%   BMI 29.51 kg/m    There is no height or weight on file to calculate BMI.  Physical Exam   GENERAL: healthy, alert and no distress  EYES: Eyes grossly normal to inspection, PERRL and conjunctivae and sclerae normal  HENT:  , nose and mouth without ulcers or lesions  NECK: no adenopathy, no asymmetry, masses, or scars and thyroid normal to palpation  RESP: lungs clear to auscultation - no rales, rhonchi or wheezes  CV: regular rate and rhythm, normal S1 S2, no S3 or S4, no murmur, click or rub, no peripheral edema and peripheral pulses strong  ABDOMEN: soft, nontender, no hepatosplenomegaly, no masses and bowel sounds normal  MS: no gross musculoskeletal defects noted, no edema    Office Visit on 06/09/2022   Component Date Value Ref Range Status     Cat Dander IgE 06/09/2022 <0.10  <0.10 KU(A)/L Final    Interpretation: None Detected     Dog Dander IgE 06/09/2022 <0.10  <0.10 KU(A)/L Final    Interpretation: None Detected     Geoffrey Grass IgE 06/09/2022 <0.10  <0.10 KU(A)/L Final    Interpretation: None Detected     Isaac Grass IgE 06/09/2022 <0.10  <0.10 KU(A)/L Final    Interpretation: None Detected     Dermatophagoides farinae IgE 06/09/2022 <0.10  <0.10 KU(A)/L Final    Interpretation: None Detected     Dermatophagoide pteronyssinus IgE 06/09/2022 <0.10  <0.10 KU(A)/L Final    Interpretation: None Detected     Alternaria alternata, Mold IgE 06/09/2022 <0.10  <0.10 KU(A)/L Final    Interpretation: None Detected     Aspergillis fumigatus IgE 06/09/2022 <0.10  <0.10  KU(A)/L Final    Interpretation: None Detected     Cladosporium herbarum IgE 06/09/2022 <0.10  <0.10 KU(A)/L Final    Interpretation: None Detected     Epicoccum purpurascens IgE 06/09/2022 <0.10  <0.10 KU(A)/L Final    Interpretation: None Detected     Penicillium notatum IgE 06/09/2022 <0.10  <0.10 KU(A)/L Final    Interpretation: None Detected     White Boby, Tree IgE 06/09/2022 <0.10  <0.10 KU(A)/L Final    Interpretation: None Detected     [place], Tree IgE 06/09/2022 <0.10  <0.10 KU(A)/L Final    Interpretation: None Detected     [place] IgE 06/09/2022 <0.10  <0.10 KU(A)/L Final    Interpretation: None Detected     Elm Tree IgE 06/09/2022 <0.10  <0.10 KU(A)/L Final    Interpretation: None Detected     Maple Tree IgE 06/09/2022 <0.10  <0.10 KU(A)/L Final    Interpretation: None Detected     [place] (White) IgE 06/09/2022 <0.10  <0.10 KU(A)/L Final    Interpretation: None Detected     Red [place] IgE 06/09/2022 <0.10  <0.10 KU(A)/L Final    Interpretation: None Detected     Silver Birch IgE 06/09/2022 <0.10  <0.10 KU(A)/L Final    Interpretation: None Detected     White [place]rory IgE 06/09/2022 <0.10  <0.10 KU(A)/L Final    Interpretation: None Detected     [place] Tree IgE 06/09/2022 <0.10  <0.10 KU(A)/L Final    Interpretation: None Detected     [place] IgE 06/09/2022 <0.10  <0.10 KU(A)/L Final    Interpretation: None Detected     English Plantain IgE 06/09/2022 <0.10  <0.10 KU(A)/L Final    Interpretation: None Detected     Giant Ragweed IgE 06/09/2022 <0.10  <0.10 KU(A)/L Final    Interpretation: None Detected     Lamb's Quarter's IgE 06/09/2022 <0.10  <0.10 KU(A)/L Final    Interpretation: None Detected     Mugwort IgE 06/09/2022 <0.10  <0.10 KU(A)/L Final    Interpretation: None Detected     Ragweed Short IgE 06/09/2022 <0.10  <0.10 KU(A)/L Final    Interpretation: None Detected     Sagebrush Wormwood IgE 06/09/2022 <0.10  <0.10 KU(A)/L Final    Interpretation: None Detected     Sheep [place] IgE 06/09/2022  <0.10  <0.10 KU(A)/L Final    Interpretation: None Detected     Russian Thistle IgE 06/09/2022 <0.10  <0.10 KU(A)/L Final    Interpretation: None Detected     Weed Nettle IgE 06/09/2022 <0.10  <0.10 KU(A)/L Final    Interpretation: None Detected     Kochia/Firebush IgE 06/09/2022 <0.10  <0.10 KU(A)/L Final    Interpretation: None Detected

## 2022-10-21 NOTE — LETTER
October 24, 2022      Itzel DUVALL [place]  [address]   [place] [address]        Dear Itzel:    We are writing to inform you of your test results.    COVID test and Influenza came back normal.   Thank you for letting me screen you for these.     Resulted Orders   Symptomatic; Yes; 10/14/2022 COVID-19 Virus (Coronavirus) by PCR Nose   Result Value Ref Range    SARS CoV2 PCR Negative Negative      Comment:      NEGATIVE: SARS-CoV-2 (COVID-19) RNA not detected, presumed negative.    Narrative    Testing was performed using the [place] SARS-CoV-2 Assay on the  [employer] Instrument System. Additional information about this  Emergency Use Authorization (EUA) assay can be found via the Lab  Guide. This test should be ordered for the detection of SARS-CoV-2 in  individuals who meet SARS-CoV-2 clinical and/or epidemiological  criteria. Test performance is unknown in asymptomatic patients. This  test is for in vitro diagnostic use under the FDA EUA for  laboratories certified under CLIA to perform high complexity testing.  This test has not been FDA cleared or approved. A negative result  does not rule out the presence of PCR inhibitors in the specimen or  target RNA in concentration below the limit of detection for the  assay. The possibility of a false negative should be considered if  the patient's recent exposure or clinical presentation suggests  COVID-19. This test was validated by the [facility]. This laboratory is certified under  the Clinical Laboratory Improvement Amendments of 1988 (CLIA-88) as  qualified to perform high complexity laboratory testing.   Influenza A & B Antigen - Clinic Collect   Result Value Ref Range    Influenza A antigen Negative Negative    Influenza B antigen Negative Negative    Narrative    Test results must be correlated with clinical data. If necessary, results should be confirmed by a molecular assay or viral culture.      If you have any questions or concerns, please call the clinic at the number listed above.     Sincerely,      Miracle Peña MD/willy

## 2022-10-21 NOTE — LETTER
My Asthma Action Plan    Name: Itzel Zacarias   YOB: 1965  Date: 10/21/2022   My doctor: Miracle Peña MD   My clinic: [facility]        My Control Medicine: Budesonide + formoterol (Symbicort HFA) -  160/4.5 mcg bid  Montelukast (Singulair) -  10 mg    My Rescue Medicine: Albuterol (Proair/Ventolin/Proventil HFA) 2-4 puffs EVERY 4 HOURS as needed. Use a spacer if recommended by your provider.  My Oral Steroid Medicine: call clinic My Asthma Severity:   Moderate Persistent  Know your asthma triggers: smoke and upper respiratory infections  Climbing the stairs            GREEN ZONE   Good Control    I feel good    No cough or wheeze    Can work, sleep and play without asthma symptoms       Take your asthma control medicine every day.     1. If exercise triggers your asthma, take your rescue medication    15 minutes before exercise or sports, and    During exercise if you have asthma symptoms  2. Spacer to use with inhaler: If you have a spacer, make sure to use it with your inhaler             YELLOW ZONE Getting Worse  I have ANY of these:    I do not feel good    Cough or wheeze    Chest feels tight    Wake up at night   1. Keep taking your Green Zone medications  2. Start taking your rescue medicine:    every 20 minutes for up to 1 hour. Then every 4 hours for 24-48 hours.  3. If you stay in the Yellow Zone for more than 12-24 hours, contact your doctor.  4. If you do not return to the Green Zone in 12-24 hours or you get worse, start taking your oral steroid medicine if prescribed by your provider.           RED ZONE Medical Alert - Get Help  I have ANY of these:    I feel awful    Medicine is not helping    Breathing getting harder    Trouble walking or talking    Nose opens wide to breathe       1. Take your rescue medicine NOW  2. If your provider has prescribed an oral steroid medicine, start taking it NOW  3. Call your doctor NOW  4. If you are still in the  Red Zone after 20 minutes and you have not reached your doctor:    Take your rescue medicine again and    Call 911 or go to the emergency room right away    See your regular doctor within 2 weeks of an Emergency Room or Urgent Care visit for follow-up treatment.          Annual Reminders:  Meet with Asthma Educator,  Flu Shot in the Fall, consider Pneumonia Vaccination for patients with asthma (aged 19 and older).    Pharmacy:    [id number] - [place], [place] - [address] AT [address] [place] - [address]    Electronically signed by Miracle Peña MD   Date: 10/21/22                      Asthma Triggers  How To Control Things That Make Your Asthma Worse    Triggers are things that make your asthma worse.  Look at the list below to help you find your triggers and what you can do about them.  You can help prevent asthma flare-ups by staying away from your triggers.      Trigger                                                          What you can do   Cigarette Smoke  Tobacco smoke can make asthma worse. Do not allow smoking in your home, car or around you.  Be sure no one smokes at a child s day care or school.  If you smoke, ask your health care provider for ways to help you quit.  Ask family members to quit too.  Ask your health care provider for a referral to Quit Plan to help you quit smoking, or call 3-651-928-PLAN.     Colds, Flu, Bronchitis  These are common triggers of asthma. Wash your hands often.  Don t touch your eyes, nose or mouth.  Get a flu shot every year.     Dust Mites  These are tiny bugs that live in cloth or carpet. They are too small to see. Wash sheets and blankets in hot water every week.   Encase pillows and mattress in dust mite proof covers.  Avoid having carpet if you can. If you have carpet, vacuum weekly.   Use a dust mask and HEPA vacuum.   Pollen and Outdoor Mold  Some people are allergic to trees,  grass, or weed pollen, or molds. Try to keep your windows closed.  Limit time out doors when pollen count is high.   Ask you health care provider about taking medicine during allergy season.     Animal Dander  Some people are allergic to skin flakes, urine or saliva from pets with fur or feathers. Keep pets with fur or feathers out of your home.    If you can t keep the pet outdoors, then keep the pet out of your bedroom.  Keep the bedroom door closed.  Keep pets off cloth furniture and away from stuffed toys.     Mice, Rats, and Cockroaches   Some people are allergic to the waste from these pests.   Cover food and garbage.  Clean up spills and food crumbs.  Store grease in the refrigerator.   Keep food out of the bedroom.   Indoor Mold  This can be a trigger if your home has high moisture. Fix leaking faucets, pipes, or other sources of water.   Clean moldy surfaces.  Dehumidify basement if it is damp and smelly.   Smoke, Strong Odors, and Sprays  These can reduce air quality. Stay away from strong odors and sprays, such as perfume, powder, hair spray, paints, smoke incense, paint, cleaning products, candles and new carpet.   Exercise or Sports  Some people with asthma have this trigger. Be active!  Ask your doctor about taking medicine before sports or exercise to prevent symptoms.    Warm up for 5-10 minutes before and after sports or exercise.     Other Triggers of Asthma  Cold air:  Cover your nose and mouth with a scarf.  Sometimes laughing or crying can be a trigger.  Some medicines and food can trigger asthma.

## 2022-10-24 NOTE — RESULT ENCOUNTER NOTE
Itzel Zacarias      COVID test and Influenza came back normal.   Thank you for letting me screen you for these.     Sincerely,       JAMES PUENTE M.D.

## 2023-01-09 ENCOUNTER — PATIENT OUTREACH (OUTPATIENT)
Dept: CARE COORDINATION | Facility: CLINIC | Age: 58
End: 2023-01-09

## 2023-01-09 NOTE — LETTER
[facility] CARE COORDINATION  [address] [place] [address]    January 9, 2023    Itzel Zacarias  [address]   [place] [address]      Dear Itzel,        I am a clinic care coordinator who works with Anai Velazquez MD with the [facility]. I wanted to introduce myself and provide you with my contact information for you to be able to call me with any questions or concerns. Below is a description of clinic care coordination and how I can further assist you.       The clinic care coordination team is made up of a registered nurse, , financial resource worker and community health worker who understand the health care system. The goal of clinic care coordination is to help you manage your health and improve access to the health care system. Our team works alongside your provider to assist you in determining your health and social needs. We can help you obtain health care and community resources, providing you with necessary information and education. We can work with you through any barriers and develop a care plan that helps coordinate and strengthen the communication between you and your care team.    Please feel free to contact me with any questions or concerns regarding care coordination and what we can offer.      We are focused on providing you with the highest-quality healthcare experience possible.    Sincerely,     Andres Orozco MSN, RN, PHN, CCM   Primary Care Clinical RN Care Coordinator  [facility]  1/9/2023   2:16 PM  [email or web address]  Office: [phone]

## 2023-01-09 NOTE — PROGRESS NOTES
Clinic Care Coordination Contact  [facility]/Voicemail       Clinical Data: Care Coordinator Outreach  Outreach attempted x 1.  Left message on patient's voicemail with call back information and requested return call.  Plan: Care Coordinator will send care coordination introduction letter with care coordinator contact information and explanation of care coordination services via mail. Care Coordinator will try to reach patient again in 1-2 business days.    Andres Orozco MSN, RN, PHN, CCM   Primary Care Clinical RN Care Coordinator  [facility]  1/9/2023   2:17 PM  [email or web address]  Office: [phone]

## 2023-01-10 NOTE — PROGRESS NOTES
Clinic Care Coordination Contact  Care Team Conversations    The RN CC nurse care coordinator contacted the patient by phone.  The patient states that she is doing fine.  The RN CC explained care coordination and the patient declined stating that she is not interested.      The case was marked declined and closed as accordingly.      Andres Orozco MSN, RN, PHN, [facility]   Primary Care Clinical RN Care Coordinator  [facility]  1/10/2023   1:06 PM  [email or web address]  Office: [phone]

## 2023-01-17 ENCOUNTER — OFFICE VISIT (OUTPATIENT)
Dept: FAMILY MEDICINE | Facility: CLINIC | Age: 58
End: 2023-01-17
Payer: COMMERCIAL

## 2023-01-17 VITALS
RESPIRATION RATE: 20 BRPM | SYSTOLIC BLOOD PRESSURE: 120 MMHG | TEMPERATURE: 98.4 F | OXYGEN SATURATION: 98 % | HEART RATE: 78 BPM | BODY MASS INDEX: 29.51 KG/M2 | HEIGHT: 67 IN | DIASTOLIC BLOOD PRESSURE: 72 MMHG | WEIGHT: 188 LBS

## 2023-01-17 DIAGNOSIS — B37.31 CANDIDIASIS OF VAGINA: ICD-10-CM

## 2023-01-17 DIAGNOSIS — J32.9 BACTERIAL SINUSITIS: ICD-10-CM

## 2023-01-17 DIAGNOSIS — Z13.220 SCREENING FOR HYPERLIPIDEMIA: Primary | ICD-10-CM

## 2023-01-17 DIAGNOSIS — B96.89 BACTERIAL SINUSITIS: ICD-10-CM

## 2023-01-17 PROCEDURE — 99213 OFFICE O/P EST LOW 20 MIN: CPT | Performed by: INTERNAL MEDICINE

## 2023-01-17 RX ORDER — FLUCONAZOLE 150 MG/1
150 TABLET ORAL
Qty: 3 TABLET | Refills: 0 | Status: SHIPPED | OUTPATIENT
Start: 2023-01-17 | End: 2023-01-24

## 2023-01-17 RX ORDER — SULFAMETHOXAZOLE/TRIMETHOPRIM 800-160 MG
1 TABLET ORAL 2 TIMES DAILY
Qty: 14 TABLET | Refills: 0 | Status: SHIPPED | OUTPATIENT
Start: 2023-01-17 | End: 2023-01-24

## 2023-01-17 ASSESSMENT — ASTHMA QUESTIONNAIRES
QUESTION_2 LAST FOUR WEEKS HOW OFTEN HAVE YOU HAD SHORTNESS OF BREATH: ONCE OR TWICE A WEEK
QUESTION_1 LAST FOUR WEEKS HOW MUCH OF THE TIME DID YOUR ASTHMA KEEP YOU FROM GETTING AS MUCH DONE AT WORK, SCHOOL OR AT HOME: NONE OF THE TIME
QUESTION_4 LAST FOUR WEEKS HOW OFTEN HAVE YOU USED YOUR RESCUE INHALER OR NEBULIZER MEDICATION (SUCH AS ALBUTEROL): TWO OR THREE TIMES PER WEEK
QUESTION_5 LAST FOUR WEEKS HOW WOULD YOU RATE YOUR ASTHMA CONTROL: WELL CONTROLLED
EMERGENCY_ROOM_LAST_YEAR_TOTAL: ONE
ACT_TOTALSCORE: 21
ACT_TOTALSCORE: 21
QUESTION_3 LAST FOUR WEEKS HOW OFTEN DID YOUR ASTHMA SYMPTOMS (WHEEZING, COUGHING, SHORTNESS OF BREATH, CHEST TIGHTNESS OR PAIN) WAKE YOU UP AT NIGHT OR EARLIER THAN USUAL IN THE MORNING: NOT AT ALL
HOSPITALIZATION_OVERNIGHT_LAST_YEAR_TOTAL: ONE

## 2023-01-17 NOTE — PROGRESS NOTES
"  Assessment & Plan   Problem List Items Addressed This Visit    None  Visit Diagnoses     Screening for hyperlipidemia    -  Primary    Bacterial sinusitis        Relevant Medications    sulfamethoxazole-trimethoprim (BACTRIM DS) 800-160 MG tablet    fluconazole (DIFLUCAN) 150 MG tablet    Candidiasis of vagina        Relevant Medications    fluconazole (DIFLUCAN) 150 MG tablet                MED REC REQUIRED  Post Medication Reconciliation Status:       BMI:   Estimated body mass index is 29.53 kg/m  as calculated from the following:    Height as of this encounter: 1.699 m (5' 6.9\").    Weight as of this encounter: 85.3 kg (188 lb).   Weight management plan: Discussed healthy diet and exercise guidelines    Work on weight loss  Regular exercise    No follow-ups on file.    Adrián Em MD  [facility] [place] is a 57 year old, presenting for the following health issues:  Hospital F/U      History of Present Illness       Reason for visit:  Follow up visit    She eats 0-1 servings of fruits and vegetables daily.She consumes 1 sweetened beverage(s) daily.She exercises with enough effort to increase her heart rate 9 or less minutes per day.  She exercises with enough effort to increase her heart rate 3 or less days per week.   She is taking medications regularly.       ED/UC Followup:    Facility:  [facility]  Date of visit: 1/5/23  Reason for visit: URI  Current Status: Better        Review of Systems   Constitutional, HEENT, cardiovascular, pulmonary, gi and gu systems are negative, except as otherwise noted.      Objective    /72 (BP Location: Right arm, Patient Position: Chair, Cuff Size: Adult Regular)   Pulse 78   Temp 98.4  F (36.9  C)   Resp 20   Ht 1.699 m (5' 6.9\")   Wt 85.3 kg (188 lb)   SpO2 98%   BMI 29.53 kg/m    Body mass index is 29.53 kg/m .  Physical Exam   GENERAL: healthy, alert and no distress  EYES: Eyes grossly normal to inspection, " PERRL and conjunctivae and sclerae normal  HENT: ear canals and TM's normal, nose and mouth without ulcers or lesions  NECK: no adenopathy, no asymmetry, masses, or scars and thyroid normal to palpation  RESP: lungs clear to auscultation - no rales, rhonchi or wheezes  CV: regular rate and rhythm, normal S1 S2, no S3 or S4, no murmur, click or rub, no peripheral edema and peripheral pulses strong  ABDOMEN: soft, nontender, no hepatosplenomegaly, no masses and bowel sounds normal  MS: no gross musculoskeletal defects noted, no edema  SKIN: no suspicious lesions or rashes  NEURO: Normal strength and tone, mentation intact and speech normal  BACK: no CVA tenderness, no paralumbar tenderness  PSYCH: mentation appears normal, affect normal/bright  LYMPH: no cervical, supraclavicular, axillary, or inguinal adenopathy    No results found for any visits on 01/17/23.

## 2023-02-20 DIAGNOSIS — G25.81 RESTLESS LEGS SYNDROME (RLS): ICD-10-CM

## 2023-02-20 DIAGNOSIS — J30.2 SEASONAL ALLERGIC RHINITIS, UNSPECIFIED TRIGGER: ICD-10-CM

## 2023-02-20 DIAGNOSIS — J45.30 MILD PERSISTENT ASTHMA WITHOUT COMPLICATION: ICD-10-CM

## 2023-02-20 RX ORDER — MONTELUKAST SODIUM 10 MG/1
10 TABLET ORAL AT BEDTIME
Qty: 90 TABLET | Refills: 0 | Status: SHIPPED | OUTPATIENT
Start: 2023-02-20 | End: 2023-06-30

## 2023-02-20 RX ORDER — ROPINIROLE 0.25 MG/1
0.5 TABLET, FILM COATED ORAL AT BEDTIME
Qty: 180 TABLET | Refills: 0 | Status: SHIPPED | OUTPATIENT
Start: 2023-02-20 | End: 2023-06-02

## 2023-03-22 NOTE — TELEPHONE ENCOUNTER
Letter and ACT sent to patient. In 2 weeks will check with patient.  Adry Tanner MA     This writer met with patient.  Patient aware discharge planned for today.  Patient not in agreement with discharge and called [facility] and is appealing discharge.  The Medical Record, IMM, and Detailed Notice of Discharge was sent to [facility].  MD, QUINTON, and team updated via secure chat.  CM will continue to follow.

## 2023-04-24 DIAGNOSIS — F41.1 GENERALIZED ANXIETY DISORDER: ICD-10-CM

## 2023-04-24 DIAGNOSIS — J30.89 SEASONAL ALLERGIC RHINITIS DUE TO OTHER ALLERGIC TRIGGER: ICD-10-CM

## 2023-04-24 RX ORDER — CETIRIZINE HYDROCHLORIDE 10 MG/1
TABLET ORAL
Qty: 90 TABLET | Refills: 0 | Status: SHIPPED | OUTPATIENT
Start: 2023-04-24 | End: 2023-06-30

## 2023-04-24 RX ORDER — ESCITALOPRAM OXALATE 10 MG/1
10 TABLET ORAL DAILY
Qty: 90 TABLET | Refills: 0 | Status: SHIPPED | OUTPATIENT
Start: 2023-04-24 | End: 2023-06-30

## 2023-04-25 ENCOUNTER — PATIENT OUTREACH (OUTPATIENT)
Dept: CARE COORDINATION | Facility: CLINIC | Age: 58
End: 2023-04-25
Payer: COMMERCIAL

## 2023-05-09 ENCOUNTER — PATIENT OUTREACH (OUTPATIENT)
Dept: CARE COORDINATION | Facility: CLINIC | Age: 58
End: 2023-05-09
Payer: COMMERCIAL

## 2023-06-01 DIAGNOSIS — G25.81 RESTLESS LEGS SYNDROME (RLS): ICD-10-CM

## 2023-06-02 RX ORDER — ROPINIROLE 0.25 MG/1
0.5 TABLET, FILM COATED ORAL AT BEDTIME
Qty: 180 TABLET | Refills: 0 | Status: SHIPPED | OUTPATIENT
Start: 2023-06-02 | End: 2023-06-30

## 2023-06-30 ENCOUNTER — OFFICE VISIT (OUTPATIENT)
Dept: FAMILY MEDICINE | Facility: CLINIC | Age: 58
End: 2023-06-30
Payer: COMMERCIAL

## 2023-06-30 VITALS
HEART RATE: 72 BPM | WEIGHT: 181 LBS | HEIGHT: 67 IN | TEMPERATURE: 98.2 F | BODY MASS INDEX: 28.41 KG/M2 | OXYGEN SATURATION: 97 % | SYSTOLIC BLOOD PRESSURE: 111 MMHG | DIASTOLIC BLOOD PRESSURE: 73 MMHG | RESPIRATION RATE: 16 BRPM

## 2023-06-30 DIAGNOSIS — E78.5 HYPERLIPIDEMIA LDL GOAL <100: ICD-10-CM

## 2023-06-30 DIAGNOSIS — G25.81 RESTLESS LEGS SYNDROME (RLS): ICD-10-CM

## 2023-06-30 DIAGNOSIS — R09.82 POST-NASAL DRAINAGE: ICD-10-CM

## 2023-06-30 DIAGNOSIS — M50.30 DDD (DEGENERATIVE DISC DISEASE), CERVICAL: ICD-10-CM

## 2023-06-30 DIAGNOSIS — Z13.220 SCREENING FOR HYPERLIPIDEMIA: ICD-10-CM

## 2023-06-30 DIAGNOSIS — Z12.31 VISIT FOR SCREENING MAMMOGRAM: ICD-10-CM

## 2023-06-30 DIAGNOSIS — Z00.00 ROUTINE GENERAL MEDICAL EXAMINATION AT A HEALTH CARE FACILITY: Primary | ICD-10-CM

## 2023-06-30 DIAGNOSIS — Z13.1 SCREENING FOR DIABETES MELLITUS: ICD-10-CM

## 2023-06-30 DIAGNOSIS — R53.83 FATIGUE, UNSPECIFIED TYPE: ICD-10-CM

## 2023-06-30 DIAGNOSIS — Z12.11 SCREEN FOR COLON CANCER: ICD-10-CM

## 2023-06-30 DIAGNOSIS — J31.0 CHRONIC RHINITIS: ICD-10-CM

## 2023-06-30 DIAGNOSIS — F41.1 GENERALIZED ANXIETY DISORDER: ICD-10-CM

## 2023-06-30 DIAGNOSIS — Z23 ENCOUNTER FOR IMMUNIZATION: ICD-10-CM

## 2023-06-30 DIAGNOSIS — J30.89 SEASONAL ALLERGIC RHINITIS DUE TO OTHER ALLERGIC TRIGGER: ICD-10-CM

## 2023-06-30 DIAGNOSIS — J45.30 MILD PERSISTENT ASTHMA WITHOUT COMPLICATION: ICD-10-CM

## 2023-06-30 DIAGNOSIS — J30.2 SEASONAL ALLERGIC RHINITIS, UNSPECIFIED TRIGGER: ICD-10-CM

## 2023-06-30 LAB
ALBUMIN SERPL BCG-MCNC: 4.5 G/DL (ref 3.5–5.2)
ALP SERPL-CCNC: 68 U/L (ref 35–104)
ALT SERPL W P-5'-P-CCNC: 18 U/L (ref 0–50)
ANION GAP SERPL CALCULATED.3IONS-SCNC: 13 MMOL/L (ref 7–15)
AST SERPL W P-5'-P-CCNC: 20 U/L (ref 0–45)
BASOPHILS # BLD AUTO: 0 10E3/UL (ref 0–0.2)
BASOPHILS NFR BLD AUTO: 1 %
BILIRUB SERPL-MCNC: 0.2 MG/DL
BUN SERPL-MCNC: 14.7 MG/DL (ref 6–20)
CALCIUM SERPL-MCNC: 9.6 MG/DL (ref 8.6–10)
CHLORIDE SERPL-SCNC: 102 MMOL/L (ref 98–107)
CHOLEST SERPL-MCNC: 202 MG/DL
CREAT SERPL-MCNC: 0.67 MG/DL (ref 0.51–0.95)
DEPRECATED HCO3 PLAS-SCNC: 25 MMOL/L (ref 22–29)
EOSINOPHIL # BLD AUTO: 0.4 10E3/UL (ref 0–0.7)
EOSINOPHIL NFR BLD AUTO: 6 %
ERYTHROCYTE [DISTWIDTH] IN BLOOD BY AUTOMATED COUNT: 12.9 % (ref 10–15)
GFR SERPL CREATININE-BSD FRML MDRD: >90 ML/MIN/1.73M2
GLUCOSE SERPL-MCNC: 83 MG/DL (ref 70–99)
HBA1C MFR BLD: 5.3 % (ref 0–5.6)
HCT VFR BLD AUTO: 38.9 % (ref 35–47)
HDLC SERPL-MCNC: 63 MG/DL
HGB BLD-MCNC: 12.9 G/DL (ref 11.7–15.7)
IMM GRANULOCYTES # BLD: 0 10E3/UL
IMM GRANULOCYTES NFR BLD: 0 %
LDLC SERPL CALC-MCNC: 108 MG/DL
LYMPHOCYTES # BLD AUTO: 1.1 10E3/UL (ref 0.8–5.3)
LYMPHOCYTES NFR BLD AUTO: 18 %
MCH RBC QN AUTO: 31.9 PG (ref 26.5–33)
MCHC RBC AUTO-ENTMCNC: 33.2 G/DL (ref 31.5–36.5)
MCV RBC AUTO: 96 FL (ref 78–100)
MONOCYTES # BLD AUTO: 0.5 10E3/UL (ref 0–1.3)
MONOCYTES NFR BLD AUTO: 9 %
NEUTROPHILS # BLD AUTO: 3.8 10E3/UL (ref 1.6–8.3)
NEUTROPHILS NFR BLD AUTO: 66 %
NONHDLC SERPL-MCNC: 139 MG/DL
PLATELET # BLD AUTO: 254 10E3/UL (ref 150–450)
POTASSIUM SERPL-SCNC: 4.4 MMOL/L (ref 3.4–5.3)
PROT SERPL-MCNC: 7 G/DL (ref 6.4–8.3)
RBC # BLD AUTO: 4.05 10E6/UL (ref 3.8–5.2)
SODIUM SERPL-SCNC: 140 MMOL/L (ref 136–145)
TRIGL SERPL-MCNC: 156 MG/DL
TSH SERPL DL<=0.005 MIU/L-ACNC: 0.77 UIU/ML (ref 0.3–4.2)
WBC # BLD AUTO: 5.7 10E3/UL (ref 4–11)

## 2023-06-30 PROCEDURE — 80050 GENERAL HEALTH PANEL: CPT | Performed by: FAMILY MEDICINE

## 2023-06-30 PROCEDURE — 36415 COLL VENOUS BLD VENIPUNCTURE: CPT | Performed by: FAMILY MEDICINE

## 2023-06-30 PROCEDURE — 99213 OFFICE O/P EST LOW 20 MIN: CPT | Mod: 25 | Performed by: FAMILY MEDICINE

## 2023-06-30 PROCEDURE — 90677 PCV20 VACCINE IM: CPT | Performed by: FAMILY MEDICINE

## 2023-06-30 PROCEDURE — 90715 TDAP VACCINE 7 YRS/> IM: CPT | Performed by: FAMILY MEDICINE

## 2023-06-30 PROCEDURE — 83036 HEMOGLOBIN GLYCOSYLATED A1C: CPT | Performed by: FAMILY MEDICINE

## 2023-06-30 PROCEDURE — 90472 IMMUNIZATION ADMIN EACH ADD: CPT | Performed by: FAMILY MEDICINE

## 2023-06-30 PROCEDURE — 80061 LIPID PANEL: CPT | Performed by: FAMILY MEDICINE

## 2023-06-30 PROCEDURE — 90471 IMMUNIZATION ADMIN: CPT | Performed by: FAMILY MEDICINE

## 2023-06-30 PROCEDURE — 99396 PREV VISIT EST AGE 40-64: CPT | Mod: 25 | Performed by: FAMILY MEDICINE

## 2023-06-30 RX ORDER — FLUTICASONE PROPIONATE 50 MCG
SPRAY, SUSPENSION (ML) NASAL
Qty: 16 G | Refills: 11 | Status: SHIPPED | OUTPATIENT
Start: 2023-06-30 | End: 2024-09-10

## 2023-06-30 RX ORDER — AZELASTINE 1 MG/ML
1-2 SPRAY, METERED NASAL 2 TIMES DAILY
Qty: 30 ML | Refills: 3 | Status: SHIPPED | OUTPATIENT
Start: 2023-06-30

## 2023-06-30 RX ORDER — ROPINIROLE 0.25 MG/1
0.5 TABLET, FILM COATED ORAL AT BEDTIME
Qty: 180 TABLET | Refills: 3 | Status: SHIPPED | OUTPATIENT
Start: 2023-06-30 | End: 2024-07-25

## 2023-06-30 RX ORDER — BUDESONIDE AND FORMOTEROL FUMARATE DIHYDRATE 160; 4.5 UG/1; UG/1
AEROSOL RESPIRATORY (INHALATION)
Qty: 30.6 G | Refills: 3 | Status: SHIPPED | OUTPATIENT
Start: 2023-06-30 | End: 2024-05-10

## 2023-06-30 RX ORDER — ALBUTEROL SULFATE 0.83 MG/ML
SOLUTION RESPIRATORY (INHALATION)
Qty: 90 ML | Refills: 11 | Status: SHIPPED | OUTPATIENT
Start: 2023-06-30

## 2023-06-30 RX ORDER — CETIRIZINE HYDROCHLORIDE 10 MG/1
TABLET ORAL
Qty: 90 TABLET | Refills: 3 | Status: SHIPPED | OUTPATIENT
Start: 2023-06-30 | End: 2024-08-13

## 2023-06-30 RX ORDER — ALBUTEROL SULFATE 90 UG/1
AEROSOL, METERED RESPIRATORY (INHALATION)
Qty: 18 G | Refills: 11 | Status: SHIPPED | OUTPATIENT
Start: 2023-06-30

## 2023-06-30 RX ORDER — MONTELUKAST SODIUM 10 MG/1
10 TABLET ORAL AT BEDTIME
Qty: 90 TABLET | Refills: 3 | Status: SHIPPED | OUTPATIENT
Start: 2023-06-30 | End: 2024-05-10

## 2023-06-30 RX ORDER — CELECOXIB 100 MG/1
100 CAPSULE ORAL 2 TIMES DAILY PRN
Qty: 60 CAPSULE | Refills: 0 | Status: SHIPPED | OUTPATIENT
Start: 2023-06-30

## 2023-06-30 RX ORDER — ESCITALOPRAM OXALATE 10 MG/1
10 TABLET ORAL DAILY
Qty: 90 TABLET | Refills: 3 | Status: SHIPPED | OUTPATIENT
Start: 2023-06-30 | End: 2024-08-13

## 2023-06-30 ASSESSMENT — ENCOUNTER SYMPTOMS
CHILLS: 0
HEMATOCHEZIA: 0
JOINT SWELLING: 0
BREAST MASS: 0
PALPITATIONS: 0
MYALGIAS: 0
PARESTHESIAS: 0
SHORTNESS OF BREATH: 0
COUGH: 0
DYSURIA: 0
HEMATURIA: 0
CONSTIPATION: 0
DIARRHEA: 0
DIZZINESS: 0
HEADACHES: 0
ARTHRALGIAS: 1
NERVOUS/ANXIOUS: 0
FEVER: 0
WEAKNESS: 0
FREQUENCY: 1
NAUSEA: 0
EYE PAIN: 0
ABDOMINAL PAIN: 0
HEARTBURN: 0
SORE THROAT: 0

## 2023-06-30 ASSESSMENT — PATIENT HEALTH QUESTIONNAIRE - PHQ9
SUM OF ALL RESPONSES TO PHQ QUESTIONS 1-9: 1
SUM OF ALL RESPONSES TO PHQ QUESTIONS 1-9: 1
10. IF YOU CHECKED OFF ANY PROBLEMS, HOW DIFFICULT HAVE THESE PROBLEMS MADE IT FOR YOU TO DO YOUR WORK, TAKE CARE OF THINGS AT HOME, OR GET ALONG WITH OTHER PEOPLE: NOT DIFFICULT AT ALL

## 2023-06-30 ASSESSMENT — PAIN SCALES - GENERAL: PAINLEVEL: NO PAIN (0)

## 2023-06-30 ASSESSMENT — ASTHMA QUESTIONNAIRES: ACT_TOTALSCORE: 19

## 2023-06-30 NOTE — LETTER
July 3, 2023    Itzel Zacarias  [address]    [place] [address]          Dear ,    We are writing to inform you of your test results.  Triglycerides are slightly high.  Cholesterol mildly elevated.  No medication needed; just keep working on healthy diet/exercise.     Other labs are fine.     Normal hemoglobin a1c means no diabetes or prediabetes.     Resulted Orders   Lipid panel reflex to direct LDL Non-fasting   Result Value Ref Range    Cholesterol 202 (H) <200 mg/dL    Triglycerides 156 (H) <150 mg/dL    Direct Measure HDL 63 >=50 mg/dL    LDL Cholesterol Calculated 108 (H) <=100 mg/dL    Non HDL Cholesterol 139 (H) <130 mg/dL    Narrative    Cholesterol  Desirable:  <200 mg/dL    Triglycerides  Normal:  Less than 150 mg/dL  Borderline High:  150-199 mg/dL  High:  200-499 mg/dL  Very High:  Greater than or equal to 500 mg/dL    Direct Measure HDL  Female:  Greater than or equal to 50 mg/dL   Male:  Greater than or equal to 40 mg/dL    LDL Cholesterol  Desirable:  <100mg/dL  Above Desirable:  100-129 mg/dL   Borderline High:  130-159 mg/dL   High:  160-189 mg/dL   Very High:  >= 190 mg/dL    Non HDL Cholesterol  Desirable:  130 mg/dL  Above Desirable:  130-159 mg/dL  Borderline High:  160-189 mg/dL  High:  190-219 mg/dL  Very High:  Greater than or equal to 220 mg/dL   Comprehensive metabolic panel   Result Value Ref Range    Sodium 140 136 - 145 mmol/L    Potassium 4.4 3.4 - 5.3 mmol/L    Chloride 102 98 - 107 mmol/L    Carbon Dioxide (CO2) 25 22 - 29 mmol/L    Anion Gap 13 7 - 15 mmol/L    Urea Nitrogen 14.7 6.0 - 20.0 mg/dL    Creatinine 0.67 0.51 - 0.95 mg/dL    Calcium 9.6 8.6 - 10.0 mg/dL    Glucose 83 70 - 99 mg/dL    Alkaline Phosphatase 68 35 - 104 U/L    AST 20 0 - 45 U/L      Comment:      Reference intervals for this test were updated on 6/12/2023 to more accurately reflect our healthy population. There may be differences in the flagging of prior results with  similar values performed with this method. Interpretation of those prior results can be made in the context of the updated reference intervals.    ALT 18 0 - 50 U/L      Comment:      Reference intervals for this test were updated on 6/12/2023 to more accurately reflect our healthy population. There may be differences in the flagging of prior results with similar values performed with this method. Interpretation of those prior results can be made in the context of the updated reference intervals.      Protein Total 7.0 6.4 - 8.3 g/dL    Albumin 4.5 3.5 - 5.2 g/dL    Bilirubin Total 0.2 <=1.2 mg/dL    GFR Estimate >90 >60 mL/min/1.73m2   Hemoglobin A1c   Result Value Ref Range    Hemoglobin A1C 5.3 0.0 - 5.6 %      Comment:      Normal <5.7%   Prediabetes 5.7-6.4%    Diabetes 6.5% or higher     Note: Adopted from ADA consensus guidelines.   TSH with free T4 reflex   Result Value Ref Range    TSH 0.77 0.30 - 4.20 uIU/mL   CBC with platelets and differential   Result Value Ref Range    WBC Count 5.7 4.0 - 11.0 10e3/uL    RBC Count 4.05 3.80 - 5.20 10e6/uL    Hemoglobin 12.9 11.7 - 15.7 g/dL    Hematocrit 38.9 35.0 - 47.0 %    MCV 96 78 - 100 fL    MCH 31.9 26.5 - 33.0 pg    MCHC 33.2 31.5 - 36.5 g/dL    RDW 12.9 10.0 - 15.0 %    Platelet Count 254 150 - 450 10e3/uL    % Neutrophils 66 %    % Lymphocytes 18 %    % Monocytes 9 %    % Eosinophils 6 %    % Basophils 1 %    % Immature Granulocytes 0 %    Absolute Neutrophils 3.8 1.6 - 8.3 10e3/uL    Absolute Lymphocytes 1.1 0.8 - 5.3 10e3/uL    Absolute Monocytes 0.5 0.0 - 1.3 10e3/uL    Absolute Eosinophils 0.4 0.0 - 0.7 10e3/uL    Absolute Basophils 0.0 0.0 - 0.2 10e3/uL    Absolute Immature Granulocytes 0.0 <=0.4 10e3/uL       If you have any questions or concerns, please call the clinic at the number listed above.       Sincerely,      Kolby Allen MD

## 2023-06-30 NOTE — PATIENT INSTRUCTIONS
We will send you lab results    Change the cetirizine to night    Other meds as is    Increase exercise    Return the FIT stool test    Schedule mammogram          Preventive Health Recommendations  Female Ages 50 - 64    Yearly exam: See your health care provider every year in order to  Review health changes.   Discuss preventive care.    Review your medicines if your doctor has prescribed any.    Get a Pap test every three years (unless you have an abnormal result and your provider advises testing more often).  If you get Pap tests with HPV test, you only need to test every 5 years, unless you have an abnormal result.   You do not need a Pap test if your uterus was removed (hysterectomy) and you have not had cancer.  You should be tested each year for STDs (sexually transmitted diseases) if you're at risk.   Have a mammogram every 1 to 2 years.  Have a colonoscopy at age 50, or have a yearly FIT test (stool test). These exams screen for colon cancer.    Have a cholesterol test every 5 years, or more often if advised.  Have a diabetes test (fasting glucose) every three years. If you are at risk for diabetes, you should have this test more often.   If you are at risk for osteoporosis (brittle bone disease), think about having a bone density scan (DEXA).    Shots: Get a flu shot each year. Get a tetanus shot every 10 years.    Nutrition:   Eat at least 5 servings of fruits and vegetables each day.  Eat whole-grain bread, whole-wheat pasta and brown rice instead of white grains and rice.  Get adequate Calcium and Vitamin D.     Lifestyle  Exercise at least 150 minutes a week (30 minutes a day, 5 days a week). This will help you control your weight and prevent disease.  Limit alcohol to one drink per day.  No smoking.   Wear sunscreen to prevent skin cancer.   See your dentist every six months for an exam and cleaning.  See your eye doctor every 1 to 2 years.

## 2023-06-30 NOTE — PROGRESS NOTES
SUBJECTIVE:   CC: Itzel is an 57 year old who presents for preventive health visit.       6/30/2023     8:28 AM   Additional Questions   Roomed by Heather Davis     Healthy Habits:     Getting at least 3 servings of Calcium per day:  NO    Bi-annual eye exam:  Yes    Dental care twice a year:  NO    Sleep apnea or symptoms of sleep apnea:  Daytime drowsiness    Diet:  Regular (no restrictions)    Frequency of exercise:  1 day/week    Duration of exercise:  30-45 minutes    Taking medications regularly:  Yes    Barriers to taking medications:  None    Medication side effects:  None    Additional concerns today:  Yes      Today's PHQ-9 Score:       6/30/2023     8:40 AM   PHQ-9 SCORE   PHQ-9 Total Score MyChart 1 (Minimal depression)   PHQ-9 Total Score 1                         Social History     Tobacco Use     Smoking status: Never     Passive exposure: Never     Smokeless tobacco: Never   Substance Use Topics     Alcohol use: Yes     Comment: weekends 1 drink              6/30/2023     8:39 AM   Alcohol Use   Prescreen: >3 drinks/day or >7 drinks/week? Yes   AUDIT SCORE  4         6/30/2023     8:39 AM   AUDIT - Alcohol Use Disorders Identification Test - Reproduced from the World Health Organization Audit 2001 (Second Edition)   1.  How often do you have a drink containing alcohol? 2 to 3 times a week   2.  How many drinks containing alcohol do you have on a typical day when you are drinking? 1 or 2   3.  How often do you have five or more drinks on one occasion? Less than monthly   4.  How often during the last year have you found that you were not able to stop drinking once you had started? Never   5.  How often during the last year have you failed to do what was normally expected of you because of drinking? Never   6.  How often during the last year have you needed a first drink in the morning to get yourself going after a heavy drinking session? Never   7.  How often during the last year have you had a  feeling of guilt or remorse after drinking? Never   8.  How often during the last year have you been unable to remember what happened the night before because of your drinking? Never   9.  Have you or someone else been injured because of your drinking? No   10. Has a relative, friend, doctor or other health care worker been concerned about your drinking or suggested you cut down? No   TOTAL SCORE 4     Reviewed orders with patient.  Reviewed health maintenance and updated orders accordingly - Yes        Breast Cancer Screenin/25/2022     9:07 AM   Breast CA Risk Assessment (FHS-7)   Do you have a family history of breast, colon, or ovarian cancer? No / Unknown         Mammogram Screening: Recommended annual mammography  Pertinent mammograms are reviewed under the imaging tab.    History of abnormal Pap smear: NO - age 30-65 PAP every 5 years with negative HPV co-testing recommended      Latest Ref Rng & Units 2022     9:38 AM 2017     3:50 PM 2017     3:45 PM   PAP / HPV   PAP  Negative for Intraepithelial Lesion or Malignancy (NILM)      PAP (Historical)    NIL    HPV 16 DNA Negative Negative  Negative     HPV 18 DNA Negative Negative  Negative     Other HR HPV Negative Negative  Negative       Reviewed and updated as needed this visit by clinical staff   Tobacco  Allergies  Meds              Reviewed and updated as needed this visit by Provider                     Review of Systems   Constitutional: Negative for chills and fever.   HENT: Negative for congestion, ear pain, hearing loss and sore throat.    Eyes: Negative for pain and visual disturbance.   Respiratory: Negative for cough and shortness of breath.    Cardiovascular: Negative for chest pain, palpitations and peripheral edema.   Gastrointestinal: Negative for abdominal pain, constipation, diarrhea, heartburn, hematochezia and nausea.   Breasts:  Negative for tenderness, breast mass and discharge.   Genitourinary: Positive for  "frequency. Negative for dysuria, genital sores, hematuria, pelvic pain, urgency, vaginal bleeding and vaginal discharge.   Musculoskeletal: Positive for arthralgias. Negative for joint swelling and myalgias.   Skin: Negative for rash.   Neurological: Negative for dizziness, weakness, headaches and paresthesias.   Psychiatric/Behavioral: Negative for mood changes. The patient is not nervous/anxious.      More asthma symptoms    Tired all day    rls bad when not on ropinirole    Occasional  Leaks when cough/ sneeze    Some knee pain but not preventing patient from doing anything    8 hours sleep         OBJECTIVE:   /73 (BP Location: Left arm, Patient Position: Chair, Cuff Size: Adult Regular)   Pulse 72   Temp 98.2  F (36.8  C) (Temporal)   Resp 16   Ht 1.702 m (5' 7\")   Wt 82.1 kg (181 lb)   SpO2 97%   BMI 28.35 kg/m    Physical Exam  GENERAL: healthy, alert and no distress  EYES: Eyes grossly normal to inspection, PERRL and conjunctivae and sclerae normal  HENT: ear canals and TM's normal, nose and mouth without ulcers or lesions  NECK: no adenopathy, no asymmetry, masses, or scars and thyroid normal to palpation  RESP: lungs clear to auscultation - no rales, rhonchi or wheezes  CV: regular rate and rhythm, normal S1 S2, no S3 or S4, no murmur, click or rub, no peripheral edema and peripheral pulses strong  ABDOMEN: soft, nontender, no hepatosplenomegaly, no masses and bowel sounds normal  MS: no gross musculoskeletal defects noted, no edema  SKIN: no suspicious lesions or rashes  NEURO: Normal strength and tone, mentation intact and speech normal  PSYCH: mentation appears normal, affect normal/bright    Diagnostic Test Results:  Labs reviewed in Epic    ASSESSMENT/PLAN:   Itzel was seen today for physical.    Diagnoses and all orders for this visit:    Routine general medical examination at a health care facility    Screen for colon cancer  -     Fecal colorectal cancer screen FIT - Future (S+30); " Future  -     Fecal colorectal cancer screen FIT - Future (S+30)    Screening for hyperlipidemia    Encounter for immunization  -     Pneumococcal 20 Valent Conjugate (Prevnar 20)  -     TDAP VACCINE (Adacel, Boostrix)  -     SCREENING QUESTIONS FOR ADULT IMMUNIZATIONS    Fatigue, unspecified type  -     CBC with Platelets & Differential; Future  -     TSH with free T4 reflex; Future  -     CBC with Platelets & Differential  -     TSH with free T4 reflex    Screening for diabetes mellitus  -     Hemoglobin A1c; Future  -     Hemoglobin A1c    Hyperlipidemia LDL goal <100  -     Lipid panel reflex to direct LDL Non-fasting; Future  -     Comprehensive metabolic panel; Future  -     Lipid panel reflex to direct LDL Non-fasting  -     Comprehensive metabolic panel    Mild persistent asthma without complication  -     budesonide-formoterol (SYMBICORT) 160-4.5 MCG/ACT Inhaler; INHALE TWO PUFFS BY MOUTH TWICE A DAY  -     montelukast (SINGULAIR) 10 MG tablet; Take 1 tablet (10 mg) by mouth At Bedtime  -     albuterol (PROVENTIL) (2.5 MG/3ML) 0.083% neb solution; NEBULIZE CONTENTS OF ONE VIAL EVERY 6 HOURS AS NEEDED  -     albuterol (VENTOLIN HFA) 108 (90 Base) MCG/ACT inhaler; INHALE TWO PUFFS BY MOUTH EVERY 6 HOURS AS NEEDED    Seasonal allergic rhinitis, unspecified trigger  -     montelukast (SINGULAIR) 10 MG tablet; Take 1 tablet (10 mg) by mouth At Bedtime    Seasonal allergic rhinitis due to other allergic trigger  -     cetirizine (ZYRTEC) 10 MG tablet; TAKE ONE TABLET (10 MG) BY MOUTH DAILY  -     fluticasone (FLONASE) 50 MCG/ACT nasal spray; INSTILL TWO SPRAYS IN EACH NOSTRIL ONCE DAILY    DDD (degenerative disc disease), cervical  -     celecoxib (CELEBREX) 100 MG capsule; Take 1 capsule (100 mg) by mouth 2 times daily as needed for moderate pain    Post-nasal drainage  -     azelastine (ASTELIN) 0.1 % nasal spray; Spray 1-2 sprays into both nostrils 2 times daily    Chronic rhinitis  -     azelastine (ASTELIN)  0.1 % nasal spray; Spray 1-2 sprays into both nostrils 2 times daily    Restless legs syndrome (RLS)  -     rOPINIRole (REQUIP) 0.25 MG tablet; Take 2 tablets (0.5 mg) by mouth At Bedtime    Generalized anxiety disorder  -     escitalopram (LEXAPRO) 10 MG tablet; Take 1 tablet (10 mg) by mouth daily    Visit for screening mammogram  -     *MA Screening Digital Bilateral; Future    Discussed multiple issues  Patient can schedule mammogram  Do fit test  Check labs   Refill meds  Change cetirizine to evening ( sedation )      Patient has been advised of split billing requirements and indicates understanding: Yes      COUNSELING:  Reviewed preventive health counseling, as reflected in patient instructions       Regular exercise       Healthy diet/nutrition       Vision screening       Safe sex practices/STD prevention       Colorectal Cancer Screening        She reports that she has never smoked. She has never been exposed to tobacco smoke. She has never used smokeless tobacco.       Kolby Allen MD  [facility] [place]  Answers for HPI/ROS submitted by the patient on 6/30/2023  If you checked off any problems, how difficult have these problems made it for you to do your work, take care of things at home, or get along with other people?: Not difficult at all  PHQ9 TOTAL SCORE: 1

## 2023-07-02 NOTE — RESULT ENCOUNTER NOTE
Triglycerides are slightly high.  Cholesterol mildly elevated.  No medication needed; just keep working on healthy diet/exercise.    Other labs are fine.    Normal hemoglobin a1c means no diabetes or prediabetes.    Kolby Allen MD

## 2023-08-15 ENCOUNTER — OFFICE VISIT (OUTPATIENT)
Dept: FAMILY MEDICINE | Facility: CLINIC | Age: 58
End: 2023-08-15
Payer: COMMERCIAL

## 2023-08-15 VITALS
WEIGHT: 180.2 LBS | BODY MASS INDEX: 28.28 KG/M2 | HEART RATE: 73 BPM | DIASTOLIC BLOOD PRESSURE: 70 MMHG | OXYGEN SATURATION: 96 % | HEIGHT: 67 IN | TEMPERATURE: 97.6 F | SYSTOLIC BLOOD PRESSURE: 110 MMHG | RESPIRATION RATE: 22 BRPM

## 2023-08-15 DIAGNOSIS — M54.50 ACUTE BILATERAL LOW BACK PAIN WITHOUT SCIATICA: Primary | ICD-10-CM

## 2023-08-15 PROCEDURE — 99214 OFFICE O/P EST MOD 30 MIN: CPT | Performed by: NURSE PRACTITIONER

## 2023-08-15 RX ORDER — TIZANIDINE 2 MG/1
2 TABLET ORAL 3 TIMES DAILY
Qty: 20 TABLET | Refills: 0 | Status: SHIPPED | OUTPATIENT
Start: 2023-08-15

## 2023-08-15 ASSESSMENT — ANXIETY QUESTIONNAIRES
GAD7 TOTAL SCORE: 0
IF YOU CHECKED OFF ANY PROBLEMS ON THIS QUESTIONNAIRE, HOW DIFFICULT HAVE THESE PROBLEMS MADE IT FOR YOU TO DO YOUR WORK, TAKE CARE OF THINGS AT HOME, OR GET ALONG WITH OTHER PEOPLE: NOT DIFFICULT AT ALL
6. BECOMING EASILY ANNOYED OR IRRITABLE: NOT AT ALL
GAD7 TOTAL SCORE: 0
5. BEING SO RESTLESS THAT IT IS HARD TO SIT STILL: NOT AT ALL
2. NOT BEING ABLE TO STOP OR CONTROL WORRYING: NOT AT ALL
3. WORRYING TOO MUCH ABOUT DIFFERENT THINGS: NOT AT ALL
7. FEELING AFRAID AS IF SOMETHING AWFUL MIGHT HAPPEN: NOT AT ALL
4. TROUBLE RELAXING: NOT AT ALL
1. FEELING NERVOUS, ANXIOUS, OR ON EDGE: NOT AT ALL

## 2023-08-15 ASSESSMENT — PATIENT HEALTH QUESTIONNAIRE - PHQ9
SUM OF ALL RESPONSES TO PHQ QUESTIONS 1-9: 1
10. IF YOU CHECKED OFF ANY PROBLEMS, HOW DIFFICULT HAVE THESE PROBLEMS MADE IT FOR YOU TO DO YOUR WORK, TAKE CARE OF THINGS AT HOME, OR GET ALONG WITH OTHER PEOPLE: SOMEWHAT DIFFICULT
SUM OF ALL RESPONSES TO PHQ QUESTIONS 1-9: 1

## 2023-08-15 ASSESSMENT — ASTHMA QUESTIONNAIRES: ACT_TOTALSCORE: 22

## 2023-08-15 ASSESSMENT — PAIN SCALES - GENERAL: PAINLEVEL: EXTREME PAIN (8)

## 2023-08-15 ASSESSMENT — ENCOUNTER SYMPTOMS: BACK PAIN: 1
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
## 2023-08-15 NOTE — PATIENT INSTRUCTIONS
Back pain:  This is likely a strain and should improve over the next 1-2 weeks.  Take ibuprofen up to 400-800mg every 12 hours for 1-2 weeks. Take with food.  If you develop any upset stomach or GI symptoms, please contact us.  You can also take Tylenol 1000mg every 8 hours (max 3000mg/day). Recommend taking 4 hours after Ibuprofen.  Rest, ice/heat as needed. Avoid twisting/lifting activities for the next couple days.  Use muscle relaxer as prescribed. Do not drive after taking it.  Follow-up in 1-2 weeks if symptoms do not improve or sooner if symptoms worsen.

## 2024-02-29 ENCOUNTER — PATIENT OUTREACH (OUTPATIENT)
Dept: CARE COORDINATION | Facility: CLINIC | Age: 59
End: 2024-02-29
Payer: COMMERCIAL

## 2024-03-28 ENCOUNTER — PATIENT OUTREACH (OUTPATIENT)
Dept: CARE COORDINATION | Facility: CLINIC | Age: 59
End: 2024-03-28
Payer: COMMERCIAL

## 2024-05-10 ENCOUNTER — OFFICE VISIT (OUTPATIENT)
Dept: FAMILY MEDICINE | Facility: CLINIC | Age: 59
End: 2024-05-10
Payer: COMMERCIAL

## 2024-05-10 VITALS
HEART RATE: 85 BPM | DIASTOLIC BLOOD PRESSURE: 83 MMHG | BODY MASS INDEX: 27.61 KG/M2 | TEMPERATURE: 98.1 F | HEIGHT: 68 IN | RESPIRATION RATE: 19 BRPM | OXYGEN SATURATION: 96 % | SYSTOLIC BLOOD PRESSURE: 126 MMHG | WEIGHT: 182.2 LBS

## 2024-05-10 DIAGNOSIS — J30.2 SEASONAL ALLERGIC RHINITIS, UNSPECIFIED TRIGGER: ICD-10-CM

## 2024-05-10 DIAGNOSIS — J45.30 MILD PERSISTENT ASTHMA WITHOUT COMPLICATION: Primary | ICD-10-CM

## 2024-05-10 DIAGNOSIS — F33.0 MAJOR DEPRESSIVE DISORDER, RECURRENT EPISODE, MILD (H): ICD-10-CM

## 2024-05-10 DIAGNOSIS — M51.369 DDD (DEGENERATIVE DISC DISEASE), LUMBAR: ICD-10-CM

## 2024-05-10 DIAGNOSIS — Z12.11 SCREEN FOR COLON CANCER: ICD-10-CM

## 2024-05-10 PROCEDURE — 99214 OFFICE O/P EST MOD 30 MIN: CPT | Performed by: FAMILY MEDICINE

## 2024-05-10 RX ORDER — FLUTICASONE FUROATE AND VILANTEROL 100; 25 UG/1; UG/1
1 POWDER RESPIRATORY (INHALATION) DAILY
Qty: 60 EACH | Refills: 2 | Status: SHIPPED | OUTPATIENT
Start: 2024-05-10 | End: 2024-05-10

## 2024-05-10 RX ORDER — FLUTICASONE PROPIONATE AND SALMETEROL 250; 50 UG/1; UG/1
1 POWDER RESPIRATORY (INHALATION) EVERY 12 HOURS
Qty: 60 EACH | Refills: 2 | Status: SHIPPED | OUTPATIENT
Start: 2024-05-10 | End: 2024-09-10

## 2024-05-10 RX ORDER — MONTELUKAST SODIUM 10 MG/1
10 TABLET ORAL AT BEDTIME
Qty: 90 TABLET | Refills: 3 | Status: SHIPPED | OUTPATIENT
Start: 2024-05-10

## 2024-05-10 ASSESSMENT — ASTHMA QUESTIONNAIRES
QUESTION_3 LAST FOUR WEEKS HOW OFTEN DID YOUR ASTHMA SYMPTOMS (WHEEZING, COUGHING, SHORTNESS OF BREATH, CHEST TIGHTNESS OR PAIN) WAKE YOU UP AT NIGHT OR EARLIER THAN USUAL IN THE MORNING: FOUR OR MORE NIGHTS A WEEK
QUESTION_1 LAST FOUR WEEKS HOW MUCH OF THE TIME DID YOUR ASTHMA KEEP YOU FROM GETTING AS MUCH DONE AT WORK, SCHOOL OR AT HOME: NONE OF THE TIME
ACT_TOTALSCORE: 11
QUESTION_4 LAST FOUR WEEKS HOW OFTEN HAVE YOU USED YOUR RESCUE INHALER OR NEBULIZER MEDICATION (SUCH AS ALBUTEROL): THREE OR MORE TIMES PER DAY
QUESTION_2 LAST FOUR WEEKS HOW OFTEN HAVE YOU HAD SHORTNESS OF BREATH: MORE THAN ONCE A DAY
QUESTION_5 LAST FOUR WEEKS HOW WOULD YOU RATE YOUR ASTHMA CONTROL: SOMEWHAT CONTROLLED
ACT_TOTALSCORE: 11

## 2024-05-10 ASSESSMENT — PATIENT HEALTH QUESTIONNAIRE - PHQ9
SUM OF ALL RESPONSES TO PHQ QUESTIONS 1-9: 1
10. IF YOU CHECKED OFF ANY PROBLEMS, HOW DIFFICULT HAVE THESE PROBLEMS MADE IT FOR YOU TO DO YOUR WORK, TAKE CARE OF THINGS AT HOME, OR GET ALONG WITH OTHER PEOPLE: NOT DIFFICULT AT ALL
SUM OF ALL RESPONSES TO PHQ QUESTIONS 1-9: 1

## 2024-05-10 ASSESSMENT — PAIN SCALES - GENERAL: PAINLEVEL: NO PAIN (0)

## 2024-05-10 NOTE — PROGRESS NOTES
"  Assessment & Plan     Mild persistent asthma without complication   The pathophysiology of asthma is explained. We've discussed the importance of compliance with medical regimen, and various treatment modalities such as beta agonists and inhaled steroids. The concepts of prophylactic and episodic or 'rescue' therapy has been discussed. The patient indicates understanding of these issues and knows when to call this office for help in treatment of asthma.   - montelukast (SINGULAIR) 10 MG tablet  Dispense: 90 tablet; Refill: 3  - fluticasone-salmeterol (ADVAIR) 250-50 MCG/ACT inhaler  Dispense: 60 each; Refill: 2    Seasonal allergic rhinitis, unspecified trigger  - montelukast (SINGULAIR) 10 MG tablet  Dispense: 90 tablet; Refill: 3    Major depressive disorder, recurrent episode, mild (H24)   - stable    Screen for colon cancer  - Fecal colorectal cancer screen FIT - Future (S+30)  - Fecal colorectal cancer screen FIT - Future (S+30)    DDD (degenerative disc disease), lumbar   -  stable.    MED REC REQUIRED{  Post Medication Reconciliation Status: patient was not discharged from an inpatient facility or TCU  BMI  Estimated body mass index is 27.94 kg/m  as calculated from the following:    Height as of this encounter: 1.72 m (5' 7.72\").    Weight as of this encounter: 82.6 kg (182 lb 3.2 oz).   Weight management plan: Discussed healthy diet and exercise guidelines      See Patient Instructions  Itzel is a 59 yo F mdd/anthony, htn, asthma, rls,     5/10: hosp discharge: on albuterol, symbicort 160-4.5, singulair    Subjective   Itzel is a 58 year old, presenting for the following health issues:  Hospital F/U  Impression and Plan:  Itzel Zacarias is a 58 y.o. female who presents to the ED with reports of 2 weeks of cough and chest congestion as noted above. On exam she does have end expiratory wheezing throughout that did improve with DuoNeb. Chest x-ray showed no evidence for localized infiltrate, pleural " effusion or pneumothorax. EKG shows normal sinus rhythm with no ST elevation or change from prior EKG dated January 5, 2023 and patient has normal troponin. Patient is low risk per [place] and D-dimer is normal so I do not feel PE is likely or that chest CT is indicated at this time. She has no leukocytosis or electrolyte abnormality. She has no hypoxia or increased work of breathing here and is otherwise well-appearing. I suspect asthma exacerbation due to viral URI. Did discuss risks and benefits of prednisone and will send her home with Tessalon as well as DuoNeb solution for her nebulizer. Discussed that cough from viral illness can often last 3 to 4 weeks. Recommend close follow-up with primary care provider and continued use of Mucinex if needed. She should return to the ED with any new or worsening symptoms including chest pain, nausea, syncope, new fevers. No further questions and patient was discharged home.    Diagnosis:  ENCOUNTER DIAGNOSES   ICD-10-CM   1. Exacerbation of asthma, unspecified asthma severity, unspecified whether persistent J45.901 predniSONE (DELTASONE) 20 mg tablet   albuterol-ipratropium (DUONEB) (2.5-0.5 mg) in 3 mL NEBULIZATION solution     2. Wheezing R06.2 predniSONE (DELTASONE) 20 mg tablet   albuterol-ipratropium (DUONEB) (2.5-0.5 mg) in 3 mL NEBULIZATION solution     3. Viral URI with cough J06.9 benzonatate (TESSALON) 200 mg capsule     Asthma:  She was in the ER 5/1/2024: will need recheck ACT in another 2 weeks.  Using Nebulizer twice a day now  Also has rescue inhaler   Also wakes up at night 2 AM and uses inhaler.  Controller inhaler: not covered now.        6/30/2023     8:40 AM 8/15/2023     9:46 AM 5/10/2024     9:18 AM   ACT Total Scores   ACT TOTAL SCORE (Goal Greater than or Equal to 20) 19 22 11   In the past 12 months, how many times did you visit the emergency room for your asthma without being admitted to the hospital? 1 0 0   In the past 12 months, how many times  were you hospitalized overnight because of your asthma? 0 1 0      DORYS/MDD:   On Lexapro 10 mg daily          5/10/2024     9:09 AM   Additional Questions   Roomed by Golden LO   Accompanied by self     HPI   ED/UC Followup:  Facility:  [facility]   Date of visit: 5/1/2024  Reason for visit: Exacerbation of asthma, unspecified asthma severity, unspecified whether persistent (Primary Dx);  Wheezing;  Viral URI with cough  Discharge Disposition: Home Self Care   Current Status: still coughing on and off.      Review of Systems  Constitutional, HEENT, cardiovascular, gi and gu systems are negative, except as otherwise noted.      Objective    There were no vitals taken for this visit.  There is no height or weight on file to calculate BMI.  Physical Exam   GENERAL: alert and no distress  RESP: lungs clear to auscultation - no rales, rhonchi or wheezes  CV: regular rate and rhythm, no murmur, click or rub, no peripheral edema  ABDOMEN: soft, nontender, no masses and bowel sounds normal  MS: no gross musculoskeletal defects noted, no edema  PSYCH: mentation appears normal, affect normal/bright    Signed Electronically by: Travis Mccarty MD

## 2024-05-31 ENCOUNTER — PATIENT OUTREACH (OUTPATIENT)
Dept: CARE COORDINATION | Facility: CLINIC | Age: 59
End: 2024-05-31
Payer: COMMERCIAL

## 2024-06-14 ENCOUNTER — PATIENT OUTREACH (OUTPATIENT)
Dept: CARE COORDINATION | Facility: CLINIC | Age: 59
End: 2024-06-14
Payer: COMMERCIAL

## 2024-07-24 DIAGNOSIS — G25.81 RESTLESS LEGS SYNDROME (RLS): ICD-10-CM

## 2024-07-25 RX ORDER — ROPINIROLE 0.25 MG/1
0.5 TABLET, FILM COATED ORAL AT BEDTIME
Qty: 180 TABLET | Refills: 2 | Status: SHIPPED | OUTPATIENT
Start: 2024-07-25

## 2024-08-12 DIAGNOSIS — J30.89 SEASONAL ALLERGIC RHINITIS DUE TO OTHER ALLERGIC TRIGGER: ICD-10-CM

## 2024-08-12 DIAGNOSIS — F41.1 GENERALIZED ANXIETY DISORDER: ICD-10-CM

## 2024-08-13 RX ORDER — ESCITALOPRAM OXALATE 10 MG/1
10 TABLET ORAL DAILY
Qty: 90 TABLET | Refills: 2 | Status: SHIPPED | OUTPATIENT
Start: 2024-08-13

## 2024-08-13 RX ORDER — CETIRIZINE HYDROCHLORIDE 10 MG/1
TABLET ORAL
Qty: 90 TABLET | Refills: 2 | Status: SHIPPED | OUTPATIENT
Start: 2024-08-13

## 2024-09-09 DIAGNOSIS — J30.89 SEASONAL ALLERGIC RHINITIS DUE TO OTHER ALLERGIC TRIGGER: ICD-10-CM

## 2024-09-09 DIAGNOSIS — J45.30 MILD PERSISTENT ASTHMA WITHOUT COMPLICATION: ICD-10-CM

## 2024-09-10 RX ORDER — FLUTICASONE PROPIONATE 50 MCG
SPRAY, SUSPENSION (ML) NASAL
Qty: 16 G | Refills: 11 | Status: SHIPPED | OUTPATIENT
Start: 2024-09-10

## 2024-09-10 RX ORDER — FLUTICASONE PROPIONATE AND SALMETEROL 250; 50 UG/1; UG/1
1 POWDER RESPIRATORY (INHALATION) EVERY 12 HOURS
Qty: 60 EACH | Refills: 2 | Status: SHIPPED | OUTPATIENT
Start: 2024-09-10

## 2024-11-08 ENCOUNTER — TELEPHONE (OUTPATIENT)
Dept: FAMILY MEDICINE | Facility: CLINIC | Age: 59
End: 2024-11-08
Payer: COMMERCIAL

## 2024-11-08 NOTE — LETTER
November 8, 2024    To  Itzel Zacarias  [address]    [place] [address]    Your team at [facility] cares about your health. We have reviewed your chart and based on our findings; we are making the following recommendations to better manage your health.     You are in particular need of attention regarding the following:     Call or MyChart message your clinic to schedule a colonoscopy, schedule/ a FIT Test, or order a Cologuard test. If you are unsure what type of test you need, please call your clinic and speak to clinic staff.   Colon cancer is now the second leading cause of cancer-related deaths in the [facility] for both men and women and there are over 130,000 new cases and 50,000 deaths per year from colon cancer. Colonoscopies can prevent 90-95% of these deaths. Problem lesions can be removed before they ever become cancer. This test is not only looking for cancer, but also getting rid of precancerous lesions.   If you are under/uninsured, we recommend you contact the [employer] Program.[employer] is a free colorectal cancer screening program that provides colonoscopies for eligible under/uninsured [place] men and women. If you are interested in receiving a free colonoscopy, please call [employer] at t [phone] (mention code [employer]) to see if you're eligible. Please have them send us the results.   Contact your clinic to schedule/ your FIT Test.   Schedule Annual MAMMOGRAPHY. [facility] scheduling number is [phone] or schedule in [facility] (self referral).  1 in 8 women will develop invasive breast cancer during her lifetime and it is the most common non-skin cancer in [place]. EARLY detection, new treatments, and a better understanding of the disease have increased survival rates- the 5 year survival rate in the 1960's was 63% and today it is close to 90%.  If you are under/uninsured, we recommend you contact the [facility]  Program. They offer mammograms at no charge or on a sliding fee charge. You can schedule with them at [phone]. Please have them send us the results.   PREVENTATIVE VISIT: Physical    If you have already completed these items, please contact the clinic via phone or   [facility] so your care team can review and update your records. Thank you for   choosing [facility] for your healthcare needs. For any questions,   concerns, or to schedule an appointment please contact our clinic.    Healthy Regards,      Your [facility] Care Team

## 2024-11-08 NOTE — TELEPHONE ENCOUNTER
Patient Quality Outreach    Patient is due for the following:   Asthma  -  ACT needed  Colon Cancer Screening  Breast Cancer Screening - Mammogram  Depression  -  PHQ-9 needed  Physical Preventive Adult Physical    Next Steps:   Schedule a Adult Preventative    Type of outreach:    Sent letter.    Next Steps:  Reach out within 90 days via Phone.    Max number of attempts reached: Yes. Will try again in 90 days if patient still on fail list.    Questions for provider review:    None           Heather Davis, GENARO  Chart routed to chart closed.

## 2025-01-09 DIAGNOSIS — J45.30 MILD PERSISTENT ASTHMA WITHOUT COMPLICATION: ICD-10-CM

## 2025-01-09 RX ORDER — FLUTICASONE PROPIONATE AND SALMETEROL 250; 50 UG/1; UG/1
1 POWDER RESPIRATORY (INHALATION) EVERY 12 HOURS
Qty: 3 EACH | Refills: 1 | Status: SHIPPED | OUTPATIENT
Start: 2025-01-09

## 2025-01-10 ENCOUNTER — TELEPHONE (OUTPATIENT)
Dept: FAMILY MEDICINE | Facility: CLINIC | Age: 60
End: 2025-01-10
Payer: COMMERCIAL

## 2025-01-10 NOTE — TELEPHONE ENCOUNTER
Prior Authorization Retail Medication Request    Medication/Dose: Fluticasone-salmeterol 250-50   Diagnosis and ICD code (if different than what is on RX):    New/renewal/insurance change PA/secondary ins. PA:  Previously Tried and Failed:    Rationale:      Pharmacy Information (if different than what is on RX)  Name: [facility]  Phone: [phone]   Fax:[phone]    Clinic Information  Preferred routing pool for dept communication:

## 2025-01-14 NOTE — TELEPHONE ENCOUNTER
Prior Authorization Approval    Medication: WIXELA INHUB 250-50 MCG/ACT IN AEPB  Authorization Effective Date: 1/1/2025  Authorization Expiration Date: 1/14/2026  Approved Dose/Quantity:   Reference #:     Insurance Company: [employer]Specialty [employer]'s - Phone [phone] Fax [phone]  Expected CoPay: $    CoPay Card Available:      Financial Assistance Needed:   Which Pharmacy is filling the prescription: [facility] - [address]  Pharmacy Notified: YES  Patient Notified: **Instructed pharmacy to notify patient when script is ready to /ship.**

## 2025-01-14 NOTE — TELEPHONE ENCOUNTER
PA Initiation    Medication: WIXELA INHUB 250-50 MCG/ACT IN AEPB  Insurance Company: [employer]-Specialty PA's - Phone [phone] Fax [phone]  Pharmacy Filling the Rx: [facility] - [address]  Filling Pharmacy Phone: [phone]  Filling Pharmacy Fax: [phone]  Start Date: 1/14/2025

## 2025-03-27 ENCOUNTER — ANCILLARY PROCEDURE (OUTPATIENT)
Dept: GENERAL RADIOLOGY | Facility: CLINIC | Age: 60
End: 2025-03-27
Attending: PHYSICIAN ASSISTANT
Payer: COMMERCIAL

## 2025-03-27 ENCOUNTER — OFFICE VISIT (OUTPATIENT)
Dept: FAMILY MEDICINE | Facility: CLINIC | Age: 60
End: 2025-03-27
Payer: COMMERCIAL

## 2025-03-27 VITALS
HEIGHT: 68 IN | RESPIRATION RATE: 18 BRPM | BODY MASS INDEX: 28.95 KG/M2 | OXYGEN SATURATION: 99 % | DIASTOLIC BLOOD PRESSURE: 85 MMHG | HEART RATE: 71 BPM | TEMPERATURE: 98.8 F | WEIGHT: 191 LBS | SYSTOLIC BLOOD PRESSURE: 135 MMHG

## 2025-03-27 DIAGNOSIS — M25.562 ACUTE PAIN OF LEFT KNEE: ICD-10-CM

## 2025-03-27 DIAGNOSIS — M25.562 ACUTE PAIN OF LEFT KNEE: Primary | ICD-10-CM

## 2025-03-27 RX ORDER — NAPROXEN 500 MG/1
500 TABLET ORAL 2 TIMES DAILY WITH MEALS
Qty: 60 TABLET | Refills: 0 | Status: SHIPPED | OUTPATIENT
Start: 2025-03-27

## 2025-03-27 ASSESSMENT — ASTHMA QUESTIONNAIRES
ACT_TOTALSCORE: 21
QUESTION_4 LAST FOUR WEEKS HOW OFTEN HAVE YOU USED YOUR RESCUE INHALER OR NEBULIZER MEDICATION (SUCH AS ALBUTEROL): TWO OR THREE TIMES PER WEEK
QUESTION_2 LAST FOUR WEEKS HOW OFTEN HAVE YOU HAD SHORTNESS OF BREATH: ONCE OR TWICE A WEEK
QUESTION_3 LAST FOUR WEEKS HOW OFTEN DID YOUR ASTHMA SYMPTOMS (WHEEZING, COUGHING, SHORTNESS OF BREATH, CHEST TIGHTNESS OR PAIN) WAKE YOU UP AT NIGHT OR EARLIER THAN USUAL IN THE MORNING: NOT AT ALL
QUESTION_1 LAST FOUR WEEKS HOW MUCH OF THE TIME DID YOUR ASTHMA KEEP YOU FROM GETTING AS MUCH DONE AT WORK, SCHOOL OR AT HOME: NONE OF THE TIME
QUESTION_5 LAST FOUR WEEKS HOW WOULD YOU RATE YOUR ASTHMA CONTROL: WELL CONTROLLED

## 2025-03-27 ASSESSMENT — PATIENT HEALTH QUESTIONNAIRE - PHQ9
SUM OF ALL RESPONSES TO PHQ QUESTIONS 1-9: 5
10. IF YOU CHECKED OFF ANY PROBLEMS, HOW DIFFICULT HAVE THESE PROBLEMS MADE IT FOR YOU TO DO YOUR WORK, TAKE CARE OF THINGS AT HOME, OR GET ALONG WITH OTHER PEOPLE: VERY DIFFICULT
SUM OF ALL RESPONSES TO PHQ QUESTIONS 1-9: 5

## 2025-03-27 ASSESSMENT — PAIN SCALES - GENERAL: PAINLEVEL_OUTOF10: SEVERE PAIN (9)

## 2025-03-27 NOTE — LETTER
3/27/2025    Itzel Zacarias   1965        To Whom it May Concern;    Please excuse Itzel Zacarias from work/school for a healthcare visit on Mar 27, 2025. Please excuse her from work 3/27/25-4/3/25 due to an injury.     Sincerely,        Urmila Purdy PA-C

## 2025-03-27 NOTE — RESULT ENCOUNTER NOTE
Small amount of swelling in the knee noted. Follow up with physical therapy as planned.   Urmila Purdy PA-C

## 2025-03-27 NOTE — PROGRESS NOTES
"  Assessment & Plan     Acute pain of left knee  RICE therapy discussed. Patient to see physical therapy, has brace at home. Can wear for comfort. 1 week off work due to physical active job. Follow up if worsening or not improving.   - XR Knee Left 3 Views; Future  - Physical Therapy  Referral; Future  - naproxen (NAPROSYN) 500 MG tablet; Take 1 tablet (500 mg) by mouth 2 times daily (with meals).    The longitudinal plan of care for the diagnosis(es)/condition(s) as documented were addressed during this visit. Due to the added complexity in care, I will continue to support Itzel in the subsequent management and with ongoing continuity of care.      BMI  Estimated body mass index is 29.28 kg/m  as calculated from the following:    Height as of this encounter: 1.72 m (5' 7.72\").    Weight as of this encounter: 86.6 kg (191 lb).             Subjective   Itzel is a 59 year old, presenting for the following health issues:  Knee Pain      3/27/2025     8:41 AM   Additional Questions   Roomed by Lonnie     Knee Pain    History of Present Illness       Reason for visit:  Knee pain   She is taking medications regularly.          Concern - left knee pain  Onset: 1 week  Description: Knee pain. Increasing pain over that past week  Intensity: 9/10  Progression of Symptoms:  worsening  Accompanying Signs & Symptoms: swelling  Previous history of similar problem: yes  Precipitating factors:        Worsened by: Standing and walking  Alleviating factors:        Improved by: none  Therapies tried and outcome: Ice and otc pain relief has not helped    Pain out of the blue  On her feet for 8-9 hours a day at work.   When try moving it is painful.   Some swelling, burning pain.   No numbness/tingling.   Pain is not keeping her awake.   Tried knee brace this am, but still had pain.               Objective    /85 (BP Location: Left arm, Patient Position: Sitting, Cuff Size: Adult Regular)   Pulse 71   Temp 98.8  F " "(37.1  C) (Temporal)   Resp 18   Ht 1.72 m (5' 7.72\")   Wt 86.6 kg (191 lb)   LMP  (LMP Unknown)   SpO2 99%   BMI 29.28 kg/m    Body mass index is 29.28 kg/m .  Physical Exam   GENERAL: alert and no distress  MS: no gross musculoskeletal defects noted, minimal swelling of the left knee. No internal derangement noted. Pain with palpation of the medial aspect of the knee and pain with flexion of the knee.     SKIN: no suspicious lesions or rashes  NEURO: Normal strength and tone, mentation intact and speech normal            Signed Electronically by: Urmila Purdy PA-C    "

## 2025-05-22 DIAGNOSIS — J45.30 MILD PERSISTENT ASTHMA WITHOUT COMPLICATION: ICD-10-CM

## 2025-05-22 DIAGNOSIS — J30.2 SEASONAL ALLERGIC RHINITIS, UNSPECIFIED TRIGGER: ICD-10-CM

## 2025-05-22 RX ORDER — MONTELUKAST SODIUM 10 MG/1
1 TABLET ORAL AT BEDTIME
Qty: 90 TABLET | Refills: 0 | Status: SHIPPED | OUTPATIENT
Start: 2025-05-22

## 2025-06-11 DIAGNOSIS — J30.89 SEASONAL ALLERGIC RHINITIS DUE TO OTHER ALLERGIC TRIGGER: ICD-10-CM

## 2025-06-11 RX ORDER — CETIRIZINE HYDROCHLORIDE 10 MG/1
10 TABLET ORAL DAILY
Qty: 90 TABLET | Refills: 0 | Status: SHIPPED | OUTPATIENT
Start: 2025-06-11

## 2025-06-18 ENCOUNTER — OFFICE VISIT (OUTPATIENT)
Dept: FAMILY MEDICINE | Facility: CLINIC | Age: 60
End: 2025-06-18
Payer: COMMERCIAL

## 2025-06-18 ENCOUNTER — RESULTS FOLLOW-UP (OUTPATIENT)
Dept: FAMILY MEDICINE | Facility: CLINIC | Age: 60
End: 2025-06-18

## 2025-06-18 VITALS
OXYGEN SATURATION: 97 % | HEIGHT: 68 IN | BODY MASS INDEX: 29.04 KG/M2 | RESPIRATION RATE: 12 BRPM | SYSTOLIC BLOOD PRESSURE: 128 MMHG | DIASTOLIC BLOOD PRESSURE: 80 MMHG | HEART RATE: 71 BPM | TEMPERATURE: 98.5 F | WEIGHT: 191.6 LBS

## 2025-06-18 DIAGNOSIS — F41.1 GENERALIZED ANXIETY DISORDER: ICD-10-CM

## 2025-06-18 DIAGNOSIS — Z12.11 SCREEN FOR COLON CANCER: ICD-10-CM

## 2025-06-18 DIAGNOSIS — G25.81 RESTLESS LEGS SYNDROME (RLS): ICD-10-CM

## 2025-06-18 DIAGNOSIS — Z12.31 VISIT FOR SCREENING MAMMOGRAM: ICD-10-CM

## 2025-06-18 DIAGNOSIS — J30.2 SEASONAL ALLERGIC RHINITIS, UNSPECIFIED TRIGGER: ICD-10-CM

## 2025-06-18 DIAGNOSIS — I10 HYPERTENSION GOAL BP (BLOOD PRESSURE) < 140/90: ICD-10-CM

## 2025-06-18 DIAGNOSIS — J45.30 MILD PERSISTENT ASTHMA WITHOUT COMPLICATION: Primary | ICD-10-CM

## 2025-06-18 DIAGNOSIS — F32.5 MAJOR DEPRESSIVE DISORDER IN FULL REMISSION, UNSPECIFIED WHETHER RECURRENT: ICD-10-CM

## 2025-06-18 LAB
ANION GAP SERPL CALCULATED.3IONS-SCNC: 14 MMOL/L (ref 7–15)
BUN SERPL-MCNC: 14.9 MG/DL (ref 6–20)
CALCIUM SERPL-MCNC: 9.3 MG/DL (ref 8.8–10.4)
CHLORIDE SERPL-SCNC: 103 MMOL/L (ref 98–107)
CREAT SERPL-MCNC: 0.68 MG/DL (ref 0.51–0.95)
EGFRCR SERPLBLD CKD-EPI 2021: >90 ML/MIN/1.73M2
GLUCOSE SERPL-MCNC: 99 MG/DL (ref 70–99)
HCO3 SERPL-SCNC: 23 MMOL/L (ref 22–29)
POTASSIUM SERPL-SCNC: 4.2 MMOL/L (ref 3.4–5.3)
SODIUM SERPL-SCNC: 140 MMOL/L (ref 135–145)

## 2025-06-18 PROCEDURE — 90471 IMMUNIZATION ADMIN: CPT | Performed by: NURSE PRACTITIONER

## 2025-06-18 PROCEDURE — 96127 BRIEF EMOTIONAL/BEHAV ASSMT: CPT | Performed by: NURSE PRACTITIONER

## 2025-06-18 PROCEDURE — 90746 HEPB VACCINE 3 DOSE ADULT IM: CPT | Performed by: NURSE PRACTITIONER

## 2025-06-18 PROCEDURE — 99214 OFFICE O/P EST MOD 30 MIN: CPT | Mod: 25 | Performed by: NURSE PRACTITIONER

## 2025-06-18 PROCEDURE — 80048 BASIC METABOLIC PNL TOTAL CA: CPT | Performed by: NURSE PRACTITIONER

## 2025-06-18 PROCEDURE — 3074F SYST BP LT 130 MM HG: CPT | Performed by: NURSE PRACTITIONER

## 2025-06-18 PROCEDURE — 36415 COLL VENOUS BLD VENIPUNCTURE: CPT | Performed by: NURSE PRACTITIONER

## 2025-06-18 PROCEDURE — 3079F DIAST BP 80-89 MM HG: CPT | Performed by: NURSE PRACTITIONER

## 2025-06-18 RX ORDER — MONTELUKAST SODIUM 10 MG/1
1 TABLET ORAL AT BEDTIME
Qty: 90 TABLET | Refills: 0 | Status: SHIPPED | OUTPATIENT
Start: 2025-06-18

## 2025-06-18 RX ORDER — ALBUTEROL SULFATE 0.83 MG/ML
SOLUTION RESPIRATORY (INHALATION)
Qty: 90 ML | Refills: 11 | Status: SHIPPED | OUTPATIENT
Start: 2025-06-18

## 2025-06-18 RX ORDER — CETIRIZINE HYDROCHLORIDE 10 MG/1
10 TABLET ORAL DAILY
Qty: 90 TABLET | Refills: 0 | Status: SHIPPED | OUTPATIENT
Start: 2025-06-18 | End: 2025-06-18

## 2025-06-18 RX ORDER — ROPINIROLE 0.25 MG/1
0.5 TABLET, FILM COATED ORAL AT BEDTIME
Qty: 180 TABLET | Refills: 1 | Status: SHIPPED | OUTPATIENT
Start: 2025-06-18

## 2025-06-18 RX ORDER — MONTELUKAST SODIUM 10 MG/1
1 TABLET ORAL AT BEDTIME
Qty: 90 TABLET | Refills: 0 | Status: SHIPPED | OUTPATIENT
Start: 2025-06-18 | End: 2025-06-18

## 2025-06-18 RX ORDER — ALBUTEROL SULFATE 90 UG/1
INHALANT RESPIRATORY (INHALATION)
Qty: 18 G | Refills: 11 | Status: SHIPPED | OUTPATIENT
Start: 2025-06-18

## 2025-06-18 RX ORDER — CETIRIZINE HYDROCHLORIDE 10 MG/1
10 TABLET ORAL DAILY
Qty: 90 TABLET | Refills: 0 | Status: SHIPPED | OUTPATIENT
Start: 2025-06-18

## 2025-06-18 RX ORDER — ESCITALOPRAM OXALATE 10 MG/1
10 TABLET ORAL DAILY
Qty: 90 TABLET | Refills: 0 | Status: SHIPPED | OUTPATIENT
Start: 2025-06-18

## 2025-06-18 ASSESSMENT — ANXIETY QUESTIONNAIRES
3. WORRYING TOO MUCH ABOUT DIFFERENT THINGS: NOT AT ALL
GAD7 TOTAL SCORE: 2
6. BECOMING EASILY ANNOYED OR IRRITABLE: SEVERAL DAYS
5. BEING SO RESTLESS THAT IT IS HARD TO SIT STILL: NOT AT ALL
4. TROUBLE RELAXING: NOT AT ALL
8. IF YOU CHECKED OFF ANY PROBLEMS, HOW DIFFICULT HAVE THESE MADE IT FOR YOU TO DO YOUR WORK, TAKE CARE OF THINGS AT HOME, OR GET ALONG WITH OTHER PEOPLE?: NOT DIFFICULT AT ALL
7. FEELING AFRAID AS IF SOMETHING AWFUL MIGHT HAPPEN: SEVERAL DAYS
IF YOU CHECKED OFF ANY PROBLEMS ON THIS QUESTIONNAIRE, HOW DIFFICULT HAVE THESE PROBLEMS MADE IT FOR YOU TO DO YOUR WORK, TAKE CARE OF THINGS AT HOME, OR GET ALONG WITH OTHER PEOPLE: NOT DIFFICULT AT ALL
GAD7 TOTAL SCORE: 2
1. FEELING NERVOUS, ANXIOUS, OR ON EDGE: NOT AT ALL
GAD7 TOTAL SCORE: 2
7. FEELING AFRAID AS IF SOMETHING AWFUL MIGHT HAPPEN: SEVERAL DAYS
2. NOT BEING ABLE TO STOP OR CONTROL WORRYING: NOT AT ALL

## 2025-06-18 ASSESSMENT — ASTHMA QUESTIONNAIRES
QUESTION_1 LAST FOUR WEEKS HOW MUCH OF THE TIME DID YOUR ASTHMA KEEP YOU FROM GETTING AS MUCH DONE AT WORK, SCHOOL OR AT HOME: NONE OF THE TIME
QUESTION_2 LAST FOUR WEEKS HOW OFTEN HAVE YOU HAD SHORTNESS OF BREATH: ONCE OR TWICE A WEEK
QUESTION_5 LAST FOUR WEEKS HOW WOULD YOU RATE YOUR ASTHMA CONTROL: WELL CONTROLLED
ACT_TOTALSCORE: 20
QUESTION_4 LAST FOUR WEEKS HOW OFTEN HAVE YOU USED YOUR RESCUE INHALER OR NEBULIZER MEDICATION (SUCH AS ALBUTEROL): TWO OR THREE TIMES PER WEEK
QUESTION_3 LAST FOUR WEEKS HOW OFTEN DID YOUR ASTHMA SYMPTOMS (WHEEZING, COUGHING, SHORTNESS OF BREATH, CHEST TIGHTNESS OR PAIN) WAKE YOU UP AT NIGHT OR EARLIER THAN USUAL IN THE MORNING: ONCE OR TWICE

## 2025-06-18 NOTE — RESULT ENCOUNTER NOTE
Ms. Deann,    All of your labs were normal for you.    Please contact the clinic if you have additional questions.  Thank you.    Sincerely,    RUBENS Whyte CNP

## 2025-06-18 NOTE — PATIENT INSTRUCTIONS
Based on our discussion, I have outlined the following instructions for you:      - Make sure to get more of your albuterol inhaler, nebulizer medicine, and Singulair (montelukast) to help manage your asthma.  - Try to stay away from things that trigger your asthma, like exercise and smoke in the air.  - Keep taking your current medication for anxiety.  - Practice ways to manage stress and remember to have regular check-ups.  - Get a refill of your allergy medicine.  - Stay away from things that cause your allergies and use a nasal rinse with saline to help with symptoms.  - Refill your Requip medication.  - Stick to a regular sleep schedule and try to avoid caffeine.  - Have your basic metabolic panel, kidney function, and electrolytes checked.  - Keep an eye on your blood pressure and eat a healthy diet.  - Continue taking Lexapro 10 mg every day.  - Stay active with regular exercise and consider counseling if you feel it's needed.  - You have chosen to use a FIT (fecal immunochemical test) box for your colon cancer screening.    Thank you again for your visit, and we look forward to supporting you in your journey to better health.

## 2025-06-18 NOTE — LETTER
June 18, 2025      Itzel Zacarias  [address]    [place] [address]        Dear ,    We are writing to inform you of your test results. All of your labs were normal for you.     Please contact the clinic if you have additional questions.  Thank you.  Resulted Orders   BASIC METABOLIC PANEL   Result Value Ref Range    Sodium 140 135 - 145 mmol/L    Potassium 4.2 3.4 - 5.3 mmol/L    Chloride 103 98 - 107 mmol/L    Carbon Dioxide (CO2) 23 22 - 29 mmol/L    Anion Gap 14 7 - 15 mmol/L    Urea Nitrogen 14.9 6.0 - 20.0 mg/dL    Creatinine 0.68 0.51 - 0.95 mg/dL    GFR Estimate >90 >60 mL/min/1.73m2      Comment:      eGFR calculated using 2021 CKD-EPI equation.    Calcium 9.3 8.8 - 10.4 mg/dL    Glucose 99 70 - 99 mg/dL   If you have any questions or concerns, please call the clinic at the number listed above.       Sincerely,      RUBENS Whyte CNP  Electronically signed

## 2025-06-18 NOTE — LETTER
My Asthma Action Plan    Name: Itzel Zacarias   YOB: 1965  Date: 6/18/2025   My doctor: RUBENS Whyte CNP   My clinic: [facility]        My Rescue Medicine: Albuterol (Proair/Ventolin/Proventil HFA) 2-4 puffs EVERY 4 HOURS as needed. Use a spacer if recommended by your provider.   My Asthma Severity:   Intermittent / Exercise Induced  Know your asthma triggers: smoke  Climbing the stairs          GREEN ZONE   Good Control  I feel good  No cough or wheeze  Can work, sleep and play without asthma symptoms       Take your asthma control medicine every day.     If exercise triggers your asthma, take your rescue medication  15 minutes before exercise or sports, and  During exercise if you have asthma symptoms  Spacer to use with inhaler: If you have a spacer, make sure to use it with your inhaler             YELLOW ZONE Getting Worse  I have ANY of these:  I do not feel good  Cough or wheeze  Chest feels tight  Wake up at night   Keep taking your Green Zone medications  Start taking your rescue medicine:  every 20 minutes for up to 1 hour. Then every 4 hours for 24-48 hours.  If you stay in the Yellow Zone for more than 12-24 hours, contact your doctor.  If you do not return to the Green Zone in 12-24 hours or you get worse, start taking your oral steroid medicine if prescribed by your provider.           RED ZONE Medical Alert - Get Help  I have ANY of these:  I feel awful  Medicine is not helping  Breathing getting harder  Trouble walking or talking  Nose opens wide to breathe       Take your rescue medicine NOW  If your provider has prescribed an oral steroid medicine, start taking it NOW  Call your doctor NOW  If you are still in the Red Zone after 20 minutes and you have not reached your doctor:  Take your rescue medicine again and  Call 911 or go to the emergency room right away    See your regular doctor within 2 weeks of an Emergency Room or Urgent Care visit for  follow-up treatment.          Annual Reminders:  Meet with Asthma Educator,  Flu Shot in the Fall, consider Pneumonia Vaccination for patients with asthma (aged 19 and older).    Pharmacy: [facility]  [address]    Electronically signed by RUBENS Whyte CNP   Date: 06/18/25                    Asthma Triggers  How To Control Things That Make Your Asthma Worse    Triggers are things that make your asthma worse.  Look at the list below to help you find your triggers and   what you can do about them. You can help prevent asthma flare-ups by staying away from your triggers.      Trigger                                                          What you can do   Cigarette Smoke  Tobacco smoke can make asthma worse. Do not allow smoking in your home, car or around you.  Be sure no one smokes at a child s day care or school.  If you smoke, ask your health care provider for ways to help you quit.  Ask family members to quit too.  Ask your health care provider for a referral to Quit Plan to help you quit smoking, or call 0-089-678-PLAN.     Colds, Flu, Bronchitis  These are common triggers of asthma. Wash your hands often.  Don t touch your eyes, nose or mouth.  Get a flu shot every year.     Dust Mites  These are tiny bugs that live in cloth or carpet. They are too small to see. Wash sheets and blankets in hot water every week.   Encase pillows and mattress in dust mite proof covers.  Avoid having carpet if you can. If you have carpet, vacuum weekly.   Use a dust mask and HEPA vacuum.   Pollen and Outdoor Mold  Some people are allergic to trees, grass, or weed pollen, or molds. Try to keep your windows closed.  Limit time out doors when pollen count is high.   Ask you health care provider about taking medicine during allergy season.     Animal Dander  Some people are allergic to skin flakes, urine or saliva from pets with fur or feathers. Keep pets with fur or feathers out of  your home.    If you can t keep the pet outdoors, then keep the pet out of your bedroom.  Keep the bedroom door closed.  Keep pets off cloth furniture and away from stuffed toys.     Mice, Rats, and Cockroaches  Some people are allergic to the waste from these pests.   Cover food and garbage.  Clean up spills and food crumbs.  Store grease in the refrigerator.   Keep food out of the bedroom.   Indoor Mold  This can be a trigger if your home has high moisture. Fix leaking faucets, pipes, or other sources of water.   Clean moldy surfaces.  Dehumidify basement if it is damp and smelly.   Smoke, Strong Odors, and Sprays  These can reduce air quality. Stay away from strong odors and sprays, such as perfume, powder, hair spray, paints, smoke incense, paint, cleaning products, candles and new carpet.   Exercise or Sports  Some people with asthma have this trigger. Be active!  Ask your doctor about taking medicine before sports or exercise to prevent symptoms.    Warm up for 5-10 minutes before and after sports or exercise.     Other Triggers of Asthma  Cold air:  Cover your nose and mouth with a scarf.  Sometimes laughing or crying can be a trigger.  Some medicines and food can trigger asthma.

## 2025-06-18 NOTE — PROGRESS NOTES
Assessment & Plan     (Z12.31) Visit for screening mammogram  (primary encounter diagnosis)  Comment: ***  Plan: MA Screening Bilateral w/ [place]    (J45.30) Mild persistent asthma without complication  Comment: ***  Plan: albuterol (PROVENTIL) (2.5 MG/3ML) 0.083% neb         solution, albuterol (VENTOLIN HFA) 108 (90         Base) MCG/ACT inhaler, montelukast (SINGULAIR)         10 MG tablet    (F41.1) Generalized anxiety disorder  Comment: ***  Plan: escitalopram (LEXAPRO) 10 MG tablet    (J30.2) Seasonal allergic rhinitis, unspecified trigger  Comment: ***  Plan: montelukast (SINGULAIR) 10 MG tablet    (J30.89) Seasonal allergic rhinitis due to other allergic trigger  Comment: ***  Plan: cetirizine (ZYRTEC) 10 MG tablet    (G25.81) Restless legs syndrome (RLS)  Comment: ***  Plan: rOPINIRole (REQUIP) 0.25 MG tablet    (I10) Hypertension goal BP (blood pressure) < 140/90  Comment: ***  Plan: BASIC METABOLIC PANEL    (Z12.11) Screen for colon cancer  Comment: ***  Plan: Fecal colorectal cancer screen FIT - Future         (S+30)    (F32.5) Major depressive disorder in full remission, unspecified whether recurrent  Comment: ***      Assessment & Plan  Mild persistent asthma without complication:  - Comments: Asthma is well controlled with an ACT score of 20.  - Plan:  - Refill albuterol inhaler, nebulizer treatment medication, and Singulair (montelukast) for asthma management.  - Non-pharmacological: Avoid known triggers such as exercise and smoke in the air.    Generalized anxiety disorder:  - Comments: Anxiety is improving with a score of 2.  - Plan:  - Continue current medication regimen.  - Non-pharmacological: Encourage stress management techniques and regular follow-up.    Seasonal allergic rhinitis, unspecified trigger:  - Comments: Seasonal allergies present with phlegm and itchy nose/throat.  - Plan:  - Refill allergy medication.  - Non-pharmacological: Avoid allergens and use nasal saline  "rinses.    Restless legs syndrome (RLS):  - Comments: Symptoms well controlled with Requip.  - Plan:  - Refill Requip.  - Non-pharmacological: Maintain regular sleep schedule and avoid caffeine.    Hypertension goal BP < 140/90:  - Comments: No current medication for hypertension; blood pressure is normal.  - Plan:  - Check basic metabolic panel, kidney function tests, and electrolytes.  - Non-pharmacological: Monitor blood pressure regularly and maintain a healthy diet.    Major depressive disorder in full remission, unspecified whether recurrent:  - Comments: Depression in full remission with a PHQ-9 score of 1.  - Plan:  - Continue Lexapro 10 mg daily.  - Non-pharmacological: Encourage regular physical activity and counseling if needed.    Screen for colon cancer:  - Comments: Due for colon cancer screening.  - Plan:  - Patient opted for FIT (fecal immunochemical test) box for screening.      BMI  Estimated body mass index is 29.38 kg/m  as calculated from the following:    Height as of this encounter: 1.72 m (5' 7.72\").    Weight as of this encounter: 86.9 kg (191 lb 9.6 oz).   {Weight Management Plan needed for ACO:608712}      {Follow-up (Optional):670797}    Rios Robbins is a 59 year old, presenting for the following health issues:  Recheck Medication (All )        6/18/2025     9:19 AM   Additional Questions   Roomed by Kriss MEYER CMA         6/18/2025     9:19 AM   Patient Reported Additional Medications   Patient reports taking the following new medications None     History of Present Illness       Reason for visit:  Follow up check up for getting more refills on my meds   She is taking medications regularly.      History of Present Illness-  Itzel Zacarias, 59 years old female, presented for medication refills. She has a history of mild persistent asthma, generalized anxiety disorder, seasonal allergic rhinitis, restless legs syndrome, hypertension with a goal BP < 140/90, and major depressive " disorder in full remission.    Asthma: Reports that asthma is generally well-controlled, with an ACT score of 20. Experiences occasional shortness of breath, particularly in the mornings, and sometimes requires the use of rescue inhalers. Exercise and smoke in the air are noted triggers for asthma symptoms. No current cough, wheezing, or shortness of breath at the time of the encounter. Has a history of using albuterol inhaler and nebulizer treatments, and takes Singulair at night to help manage symptoms and aid sleep.    Depression and Anxiety: Currently taking Lexapro 10 mg daily, which has been effective in managing symptoms. Reports improvement in depression and anxiety, with a PHQ-9 score of 1 and an anxiety score of 2. Occasionally experiences emotional episodes, particularly at work, but generally able to manage them. No significant life events reported, but financial concerns related to car maintenance and replacement are present. Denies any concerns about alcohol or drugs and has no suicidal ideation.    Restless Legs Syndrome: Takes Requip for restless legs syndrome, which effectively controls symptoms. Reports that missing doses results in significant discomfort. No side effects from the medication. Symptoms persist despite being on feet all day.    Allergies: Reports a history of seasonal allergies, with symptoms including phlegm in the throat, sneezing, and itchy nose and throat, but not itchy eyes. Allergies contribute to asthma symptoms. Uses Kleenex frequently at work to manage symptoms.    Asthma      6/18/2025     9:26 AM   ACT Total Scores   ACT TOTAL SCORE (Goal Greater than or Equal to 20) 20    In the past 12 months, how many times did you visit the emergency room for your asthma without being admitted to the hospital? 0   In the past 12 months, how many times were you hospitalized overnight because of your asthma? 0       Patient-reported     Do you have any of the following symptoms? None of  "these symptoms (cough/noisy breathing/trouble with breathing)  What makes your asthma/breathing worse?  Smoke  Do you want more information about how to use your inhaler? No  How many servings of fruits and vegetables do you eat daily?  { :865392}  On average, how many sweetened beverages do you drink each day (Examples: soda, juice, sweet tea, etc.  Do NOT count diet or artificially sweetened beverages)?   { 1-11:632875}  How many days per week do you exercise enough to make your heart beat faster? { :882528}  How many minutes a day do you exercise enough to make your heart beat faster? { :689698}  How many days per week do you miss taking your medication? {0-7 :951299}  {additonal problems for provider to add (Optional):863303}      Review of Systems  Constitutional, HEENT, cardiovascular, pulmonary, GI, , musculoskeletal, neuro, skin, endocrine and psych systems are negative, except as otherwise noted.      Objective    /80   Pulse 71   Temp 98.5  F (36.9  C) (Temporal)   Resp 12   Ht 1.72 m (5' 7.72\")   Wt 86.9 kg (191 lb 9.6 oz)   SpO2 97%   BMI 29.38 kg/m    Body mass index is 29.38 kg/m .  Physical Exam   GENERAL: alert and no distress  EYES: Eyes grossly normal to inspection, PERRL and conjunctivae and sclerae normal  HENT: ear canals and TM's normal, nose and mouth without ulcers or lesions  NECK: no adenopathy, no asymmetry, masses, or scars  RESP: lungs clear to auscultation - no rales, rhonchi or wheezes  CV: regular rate and rhythm, normal S1 S2, no S3 or S4, no murmur, click or rub, no peripheral edema  ABDOMEN: soft, nontender, no hepatosplenomegaly, no masses and bowel sounds normal  MS: no gross musculoskeletal defects noted, no edema  SKIN: no suspicious lesions or rashes  NEURO: Normal strength and tone, mentation intact and speech normal  PSYCH: mentation appears normal, affect normal/bright    {Diagnostic Test Results (Optional):634571}        Signed Electronically by: Cooper" RUBENS Mckeon CNP  {Email feedback regarding this note to [email or web address]   :691496}

## 2025-06-19 ENCOUNTER — PATIENT OUTREACH (OUTPATIENT)
Dept: CARE COORDINATION | Facility: CLINIC | Age: 60
End: 2025-06-19
Payer: COMMERCIAL

## 2025-06-22 NOTE — ASSESSMENT & PLAN NOTE
Comments: Anxiety is improving with a score of 2.  - Plan:  - Continue current medication regimen.  - Non-pharmacological: Encourage stress management techniques and regular follow-up  Orders:    escitalopram (LEXAPRO) 10 MG tablet; Take 1 tablet (10 mg) by mouth daily.

## 2025-06-22 NOTE — ASSESSMENT & PLAN NOTE
Comments: Asthma is well controlled with an ACT score of 20.  - Plan:  -  Avoid known triggers such as exercise and smoke in the air.  Orders:    albuterol (PROVENTIL) (2.5 MG/3ML) 0.083% neb solution; NEBULIZE CONTENTS OF ONE VIAL EVERY 6 HOURS AS NEEDED    albuterol (VENTOLIN HFA) 108 (90 Base) MCG/ACT inhaler; INHALE TWO PUFFS BY MOUTH EVERY 6 HOURS AS NEEDED    montelukast (SINGULAIR) 10 MG tablet; Take 1 tablet (10 mg) by mouth at bedtime.

## 2025-06-22 NOTE — ASSESSMENT & PLAN NOTE
Comments: Symptoms well controlled with Requip.  - Plan:  - Refill Requip.  - Maintain regular sleep schedule and avoid caffeine  Orders:    rOPINIRole (REQUIP) 0.25 MG tablet; Take 2 tablets (0.5 mg) by mouth at bedtime.

## 2025-06-22 NOTE — ASSESSMENT & PLAN NOTE
Comments: No current medication for hypertension; blood pressure is normal.  - Plan:  -  Monitor blood pressure regularly and maintain a healthy diet  Orders:    BASIC METABOLIC PANEL; Future

## 2025-07-29 ENCOUNTER — PATIENT OUTREACH (OUTPATIENT)
Dept: GASTROENTEROLOGY | Facility: CLINIC | Age: 60
End: 2025-07-29
Payer: COMMERCIAL

## 2025-07-29 DIAGNOSIS — Z12.11 SPECIAL SCREENING FOR MALIGNANT NEOPLASMS, COLON: Primary | ICD-10-CM

## 2025-07-29 LAB — HEMOCCULT STL QL IA: POSITIVE

## 2025-07-29 NOTE — PROGRESS NOTES
"Patient has had a positive FIT. Screening colonoscopy is being recommended to be completed within 3 months per protocol. Patient notified via letter.     [facility] Screening Colonoscopy Referral Review    Patient meets the inclusion criteria for screening colonoscopy standing order.    Ordering/Referring Provider:  Cooper Mckeon APRN CNP      BMI: Estimated body mass index is 29.38 kg/m  as calculated from the following:    Height as of 6/18/25: 1.72 m (5' 7.72\").    Weight as of 6/18/25: 86.9 kg (191 lb 9.6 oz).     Sedation:  Does patient have any of the following conditions affecting sedation?  No medical conditions affecting sedation.    Previous Scopes:  Any previous recommendations or follow up needs based on previous scope?  na / No recommendations.    Medical Concerns to Postpone Order:  Does patient have any of the following medical concerns that should postpone/delay colonoscopy referral?  No medical conditions affecting colonoscopy referral.    Final Referral Details:  Based on patient's medical history patient is appropriate for referral order with moderate sedation. If patient's BMI > 50 do not schedule in ASC.  "

## 2025-07-29 NOTE — LETTER
2025      Itzel JADIEL [place]  [address]    [place] [address]              Amrita Robbins,      Thank you for turning in your fecal immunochemical test (FIT test). We have received your results and your FIT test results are positive (abnormal), meaning it came back positive for blood in the stool.        This does not necessarily mean you have colon cancer or adenomatous polyps (the type of polyps that can become cancerous if not removed). This means you should have a colonoscopy for further investigation.  An order for a screening colonoscopy has been placed and one of our schedulers will be contacting you in the next 5-10 business days to discuss scheduling options. If you would like to schedule right away, please call   [facility] [phone] option 1, Monday-Friday 7:00 am to 5:00 pm.    Please note: As of , Medicare/Medicaid and commercial health insurance plans cover a colonoscopy following a positive (abnormal) FIT test. Exceptions for coverage may apply; please confirm with your insurer. They are the best source of information on your health insurance plan and coverage.       To learn more about what a positive FIT test means, please visit the website listed below for more information on understanding your results.   https://medlineplus.gov/ency/patientinstructions/058151.htm    Please call [phone] if you have any questions or concerns. One of our nurses will be happy to talk to you.       Warm regards,       Colorectal Cancer RN Screening Team   [facility]

## 2025-09-04 DIAGNOSIS — J45.30 MILD PERSISTENT ASTHMA WITHOUT COMPLICATION: ICD-10-CM

## 2025-09-04 RX ORDER — FLUTICASONE PROPIONATE AND SALMETEROL 250; 50 UG/1; UG/1
1 POWDER RESPIRATORY (INHALATION) EVERY 12 HOURS
Qty: 180 EACH | Refills: 0 | Status: SHIPPED | OUTPATIENT
Start: 2025-09-04